# Patient Record
Sex: FEMALE | Race: WHITE | NOT HISPANIC OR LATINO
[De-identification: names, ages, dates, MRNs, and addresses within clinical notes are randomized per-mention and may not be internally consistent; named-entity substitution may affect disease eponyms.]

---

## 2017-09-21 ENCOUNTER — RESULT REVIEW (OUTPATIENT)
Age: 72
End: 2017-09-21

## 2018-12-18 ENCOUNTER — RESULT REVIEW (OUTPATIENT)
Age: 73
End: 2018-12-18

## 2019-11-25 ENCOUNTER — APPOINTMENT (OUTPATIENT)
Dept: MAMMOGRAPHY | Facility: CLINIC | Age: 74
End: 2019-11-25
Payer: COMMERCIAL

## 2019-11-25 PROCEDURE — 77063 BREAST TOMOSYNTHESIS BI: CPT

## 2019-11-25 PROCEDURE — 77067 SCR MAMMO BI INCL CAD: CPT

## 2020-01-06 ENCOUNTER — APPOINTMENT (OUTPATIENT)
Dept: RADIOLOGY | Facility: CLINIC | Age: 75
End: 2020-01-06
Payer: COMMERCIAL

## 2020-01-06 PROCEDURE — 71046 X-RAY EXAM CHEST 2 VIEWS: CPT

## 2020-06-17 PROBLEM — Z00.00 ENCOUNTER FOR PREVENTIVE HEALTH EXAMINATION: Status: ACTIVE | Noted: 2020-06-17

## 2021-12-17 ENCOUNTER — OUTPATIENT (OUTPATIENT)
Dept: OUTPATIENT SERVICES | Facility: HOSPITAL | Age: 76
LOS: 1 days | End: 2021-12-17

## 2021-12-22 ENCOUNTER — OUTPATIENT (OUTPATIENT)
Dept: OUTPATIENT SERVICES | Facility: HOSPITAL | Age: 76
LOS: 1 days | End: 2021-12-22

## 2022-11-10 ENCOUNTER — APPOINTMENT (OUTPATIENT)
Dept: MAMMOGRAPHY | Facility: CLINIC | Age: 77
End: 2022-11-10

## 2022-11-10 PROCEDURE — 77063 BREAST TOMOSYNTHESIS BI: CPT

## 2022-11-10 PROCEDURE — 77067 SCR MAMMO BI INCL CAD: CPT

## 2022-12-08 ENCOUNTER — OFFICE (OUTPATIENT)
Dept: URBAN - METROPOLITAN AREA CLINIC 9 | Facility: CLINIC | Age: 77
Setting detail: OPHTHALMOLOGY
End: 2022-12-08
Payer: MEDICARE

## 2022-12-08 DIAGNOSIS — H11.153: ICD-10-CM

## 2022-12-08 DIAGNOSIS — E11.9: ICD-10-CM

## 2022-12-08 DIAGNOSIS — H25.13: ICD-10-CM

## 2022-12-08 PROBLEM — H52.4 PRESBYOPIA: Status: ACTIVE | Noted: 2022-12-08

## 2022-12-08 PROCEDURE — 92004 COMPRE OPH EXAM NEW PT 1/>: CPT | Performed by: OPHTHALMOLOGY

## 2022-12-08 ASSESSMENT — CONFRONTATIONAL VISUAL FIELD TEST (CVF)
OS_FINDINGS: FULL
OD_FINDINGS: FULL

## 2022-12-08 ASSESSMENT — REFRACTION_CURRENTRX
OD_SPHERE: +1.75
OD_OVR_VA: 20/
OS_CYLINDER: +0.25
OS_ADD: +2.50
OD_ADD: +2.50
OS_OVR_VA: 20/
OD_VPRISM_DIRECTION: PROGS
OS_VPRISM_DIRECTION: PROGS
OS_AXIS: 141
OS_SPHERE: +1.75

## 2022-12-08 ASSESSMENT — REFRACTION_MANIFEST
OS_AXIS: 050
OS_ADD: +2.75
OD_AXIS: 135
OU_VA: 20/30
OD_ADD: +2.75
OD_CYLINDER: +1.00
OD_VA2: 20/30
OS_VA2: 20/30
OS_CYLINDER: +0.50
OD_VA1: 20/30-
OS_VA1: 20/50+3
OS_SPHERE: +0.75
OD_SPHERE: +0.75

## 2022-12-08 ASSESSMENT — KERATOMETRY
OS_K2POWER_DIOPTERS: 46.50
OD_K1POWER_DIOPTERS: 46.00
OS_AXISANGLE_DEGREES: 059
METHOD_AUTO_MANUAL: AUTO
OD_K2POWER_DIOPTERS: 47.00
OS_K1POWER_DIOPTERS: 45.75
OD_AXISANGLE_DEGREES: 129

## 2022-12-08 ASSESSMENT — AXIALLENGTH_DERIVED
OD_AL: 22.0181
OS_AL: 22.3116
OD_AL: 22.1032
OS_AL: 21.9685

## 2022-12-08 ASSESSMENT — SPHEQUIV_DERIVED
OD_SPHEQUIV: 1.25
OD_SPHEQUIV: 1.5
OS_SPHEQUIV: 2
OS_SPHEQUIV: 1

## 2022-12-08 ASSESSMENT — REFRACTION_AUTOREFRACTION
OS_CYLINDER: +1.00
OD_SPHERE: +0.75
OS_SPHERE: +1.50
OD_CYLINDER: +1.50
OD_AXIS: 134
OS_AXIS: 052

## 2022-12-08 ASSESSMENT — VISUAL ACUITY
OS_BCVA: 20/50
OD_BCVA: 20/50

## 2023-06-08 ENCOUNTER — OFFICE (OUTPATIENT)
Dept: URBAN - METROPOLITAN AREA CLINIC 9 | Facility: CLINIC | Age: 78
Setting detail: OPHTHALMOLOGY
End: 2023-06-08
Payer: MEDICARE

## 2023-06-08 DIAGNOSIS — H02.831: ICD-10-CM

## 2023-06-08 DIAGNOSIS — H11.042: ICD-10-CM

## 2023-06-08 DIAGNOSIS — H16.041: ICD-10-CM

## 2023-06-08 DIAGNOSIS — H11.153: ICD-10-CM

## 2023-06-08 DIAGNOSIS — H01.001: ICD-10-CM

## 2023-06-08 DIAGNOSIS — H01.004: ICD-10-CM

## 2023-06-08 DIAGNOSIS — H02.834: ICD-10-CM

## 2023-06-08 PROCEDURE — 99213 OFFICE O/P EST LOW 20 MIN: CPT | Performed by: OPHTHALMOLOGY

## 2023-06-08 ASSESSMENT — KERATOMETRY
OS_K2POWER_DIOPTERS: 46.75
OD_K2POWER_DIOPTERS: 47.25
OS_AXISANGLE_DEGREES: 063
OD_AXISANGLE_DEGREES: 131
OS_K1POWER_DIOPTERS: 45.75
METHOD_AUTO_MANUAL: AUTO
OD_K1POWER_DIOPTERS: 46.00

## 2023-06-08 ASSESSMENT — CONFRONTATIONAL VISUAL FIELD TEST (CVF)
OS_FINDINGS: FULL
OD_FINDINGS: FULL

## 2023-06-08 ASSESSMENT — REFRACTION_MANIFEST
OD_CYLINDER: +1.00
OS_AXIS: 050
OS_VA1: 20/50+3
OU_VA: 20/30
OS_CYLINDER: +0.50
OS_ADD: +2.75
OD_SPHERE: +0.75
OS_SPHERE: +0.75
OD_AXIS: 135
OD_VA1: 20/30-
OD_ADD: +2.75
OD_VA2: 20/30
OS_VA2: 20/30

## 2023-06-08 ASSESSMENT — REFRACTION_AUTOREFRACTION
OS_SPHERE: +2.00
OD_CYLINDER: +1.50
OD_SPHERE: +0.50
OD_AXIS: 140
OS_AXIS: 036
OS_CYLINDER: +0.75

## 2023-06-08 ASSESSMENT — SPHEQUIV_DERIVED
OS_SPHEQUIV: 1
OD_SPHEQUIV: 1.25
OD_SPHEQUIV: 1.25
OS_SPHEQUIV: 2.375

## 2023-06-08 ASSESSMENT — LID POSITION - DERMATOCHALASIS
OS_DERMATOCHALASIS: LUL 1+
OD_DERMATOCHALASIS: RUL 1+

## 2023-06-08 ASSESSMENT — CORNEAL PTERYGIUM: OS_PTERYGIUM: 1MM

## 2023-06-08 ASSESSMENT — REFRACTION_CURRENTRX
OD_CYLINDER: +1.00
OS_OVR_VA: 20/
OS_SPHERE: +0.50
OD_AXIS: 049
OS_CYLINDER: +1.25
OD_SPHERE: +1.50
OD_OVR_VA: 20/
OS_ADD: +2.75
OS_AXIS: 136
OD_ADD: +2.75
OS_VPRISM_DIRECTION: PROGS
OD_VPRISM_DIRECTION: PROGS

## 2023-06-08 ASSESSMENT — AXIALLENGTH_DERIVED
OD_AL: 22.063
OS_AL: 21.8032
OS_AL: 22.2706
OD_AL: 22.063

## 2023-06-08 ASSESSMENT — LID EXAM ASSESSMENTS
OD_BLEPHARITIS: RUL 1+
OS_BLEPHARITIS: LUL 1+

## 2023-06-08 ASSESSMENT — VISUAL ACUITY
OS_BCVA: 20/20
OD_BCVA: 20/50

## 2023-06-09 ENCOUNTER — OFFICE (OUTPATIENT)
Dept: URBAN - METROPOLITAN AREA CLINIC 38 | Facility: CLINIC | Age: 78
Setting detail: OPHTHALMOLOGY
End: 2023-06-09
Payer: MEDICARE

## 2023-06-09 DIAGNOSIS — H16.041: ICD-10-CM

## 2023-06-09 PROCEDURE — 99213 OFFICE O/P EST LOW 20 MIN: CPT | Performed by: OPHTHALMOLOGY

## 2023-06-09 ASSESSMENT — KERATOMETRY
OD_AXISANGLE_DEGREES: 131
OS_K2POWER_DIOPTERS: 46.75
OD_K1POWER_DIOPTERS: 46.00
OS_K1POWER_DIOPTERS: 45.75
OD_K2POWER_DIOPTERS: 47.25
METHOD_AUTO_MANUAL: AUTO
OS_AXISANGLE_DEGREES: 063

## 2023-06-09 ASSESSMENT — REFRACTION_MANIFEST
OD_VA2: 20/30
OD_VA1: 20/30-
OS_ADD: +2.75
OD_AXIS: 135
OS_VA2: 20/30
OS_SPHERE: +0.75
OS_VA1: 20/50+3
OU_VA: 20/30
OS_CYLINDER: +0.50
OD_SPHERE: +0.75
OD_CYLINDER: +1.00
OD_ADD: +2.75
OS_AXIS: 050

## 2023-06-09 ASSESSMENT — CONFRONTATIONAL VISUAL FIELD TEST (CVF)
OD_FINDINGS: FULL
OS_FINDINGS: FULL

## 2023-06-09 ASSESSMENT — REFRACTION_AUTOREFRACTION
OD_CYLINDER: +1.50
OD_AXIS: 140
OD_SPHERE: +0.50
OS_AXIS: 036
OS_CYLINDER: +0.75
OS_SPHERE: +2.00

## 2023-06-09 ASSESSMENT — VISUAL ACUITY
OD_BCVA: 20/40-1
OS_BCVA: 20/25-2

## 2023-06-09 ASSESSMENT — REFRACTION_CURRENTRX
OS_SPHERE: +0.50
OS_OVR_VA: 20/
OD_SPHERE: +1.50
OS_ADD: +2.75
OS_AXIS: 136
OD_AXIS: 049
OD_OVR_VA: 20/
OS_VPRISM_DIRECTION: PROGS
OS_CYLINDER: +1.25
OD_CYLINDER: +1.00
OD_ADD: +2.75
OD_VPRISM_DIRECTION: PROGS

## 2023-06-09 ASSESSMENT — SPHEQUIV_DERIVED
OS_SPHEQUIV: 2.375
OD_SPHEQUIV: 1.25
OD_SPHEQUIV: 1.25
OS_SPHEQUIV: 1

## 2023-06-09 ASSESSMENT — LID POSITION - DERMATOCHALASIS
OS_DERMATOCHALASIS: LUL 1+
OD_DERMATOCHALASIS: RUL 1+

## 2023-06-09 ASSESSMENT — CORNEAL PTERYGIUM: OS_PTERYGIUM: 1MM

## 2023-06-09 ASSESSMENT — AXIALLENGTH_DERIVED
OS_AL: 21.8032
OS_AL: 22.2706
OD_AL: 22.063
OD_AL: 22.063

## 2023-06-09 ASSESSMENT — LID EXAM ASSESSMENTS
OS_BLEPHARITIS: LUL 1+
OD_BLEPHARITIS: RUL 1+

## 2023-06-16 ENCOUNTER — RX ONLY (RX ONLY)
Age: 78
End: 2023-06-16

## 2023-06-16 ENCOUNTER — OFFICE (OUTPATIENT)
Dept: URBAN - METROPOLITAN AREA CLINIC 38 | Facility: CLINIC | Age: 78
Setting detail: OPHTHALMOLOGY
End: 2023-06-16
Payer: MEDICARE

## 2023-06-16 DIAGNOSIS — H16.041: ICD-10-CM

## 2023-06-16 PROBLEM — H02.834 DERMATOCHALASIS; RIGHT UPPER LID, LEFT UPPER LID: Status: ACTIVE | Noted: 2023-06-08

## 2023-06-16 PROBLEM — H02.831 DERMATOCHALASIS; RIGHT UPPER LID, LEFT UPPER LID: Status: ACTIVE | Noted: 2023-06-08

## 2023-06-16 PROBLEM — H01.001 BLEPHARITIS; RIGHT UPPER LID, LEFT UPPER LID: Status: ACTIVE | Noted: 2023-06-08

## 2023-06-16 PROBLEM — H01.004 BLEPHARITIS; RIGHT UPPER LID, LEFT UPPER LID: Status: ACTIVE | Noted: 2023-06-08

## 2023-06-16 PROBLEM — H11.042 PTERYGIUM-PERIPHERAL; LEFT EYE: Status: ACTIVE | Noted: 2023-06-08

## 2023-06-16 PROCEDURE — 99213 OFFICE O/P EST LOW 20 MIN: CPT | Performed by: OPHTHALMOLOGY

## 2023-06-16 ASSESSMENT — REFRACTION_MANIFEST
OS_AXIS: 050
OD_CYLINDER: +1.00
OD_SPHERE: +0.75
OS_ADD: +2.75
OS_VA1: 20/50+3
OS_VA2: 20/30
OU_VA: 20/30
OD_VA2: 20/30
OD_AXIS: 135
OS_SPHERE: +0.75
OD_VA1: 20/30-
OS_CYLINDER: +0.50
OD_ADD: +2.75

## 2023-06-16 ASSESSMENT — CONFRONTATIONAL VISUAL FIELD TEST (CVF)
OS_FINDINGS: FULL
OD_FINDINGS: FULL

## 2023-06-16 ASSESSMENT — SPHEQUIV_DERIVED
OD_SPHEQUIV: 1.25
OS_SPHEQUIV: 2.375
OS_SPHEQUIV: 1
OD_SPHEQUIV: 1.25

## 2023-06-16 ASSESSMENT — REFRACTION_CURRENTRX
OS_SPHERE: +0.50
OS_OVR_VA: 20/
OD_ADD: +2.75
OS_AXIS: 136
OD_OVR_VA: 20/
OD_VPRISM_DIRECTION: PROGS
OS_VPRISM_DIRECTION: PROGS
OS_ADD: +2.75
OD_AXIS: 049
OD_CYLINDER: +1.00
OS_CYLINDER: +1.25
OD_SPHERE: +1.50

## 2023-06-16 ASSESSMENT — REFRACTION_AUTOREFRACTION
OD_SPHERE: +0.50
OD_AXIS: 140
OS_CYLINDER: +0.75
OS_AXIS: 036
OS_SPHERE: +2.00
OD_CYLINDER: +1.50

## 2023-06-16 ASSESSMENT — VISUAL ACUITY
OS_BCVA: 20/25-2
OD_BCVA: 20/40-

## 2023-06-16 ASSESSMENT — AXIALLENGTH_DERIVED
OD_AL: 22.063
OD_AL: 22.063
OS_AL: 22.2706
OS_AL: 21.8032

## 2023-06-16 ASSESSMENT — LID EXAM ASSESSMENTS
OD_BLEPHARITIS: RUL 1+
OS_BLEPHARITIS: LUL 1+

## 2023-06-16 ASSESSMENT — KERATOMETRY
OD_K1POWER_DIOPTERS: 46.00
OD_K2POWER_DIOPTERS: 47.25
OS_K1POWER_DIOPTERS: 45.75
OS_AXISANGLE_DEGREES: 063
OD_AXISANGLE_DEGREES: 131
METHOD_AUTO_MANUAL: AUTO
OS_K2POWER_DIOPTERS: 46.75

## 2023-06-16 ASSESSMENT — CORNEAL PTERYGIUM: OS_PTERYGIUM: 1MM

## 2023-06-16 ASSESSMENT — LID POSITION - DERMATOCHALASIS
OD_DERMATOCHALASIS: RUL 1+
OS_DERMATOCHALASIS: LUL 1+

## 2024-12-19 ENCOUNTER — INPATIENT (INPATIENT)
Facility: HOSPITAL | Age: 79
LOS: 18 days | Discharge: HOME | DRG: 316 | End: 2025-01-07
Attending: INTERNAL MEDICINE | Admitting: INTERNAL MEDICINE
Payer: MEDICARE

## 2024-12-19 ENCOUNTER — TRANSCRIPTION ENCOUNTER (OUTPATIENT)
Age: 79
End: 2024-12-19

## 2024-12-19 VITALS
OXYGEN SATURATION: 97 % | WEIGHT: 178.13 LBS | TEMPERATURE: 98 F | SYSTOLIC BLOOD PRESSURE: 113 MMHG | HEART RATE: 69 BPM | RESPIRATION RATE: 16 BRPM | DIASTOLIC BLOOD PRESSURE: 71 MMHG | HEIGHT: 65 IN

## 2024-12-19 DIAGNOSIS — Z95.3 PRESENCE OF XENOGENIC HEART VALVE: ICD-10-CM

## 2024-12-19 LAB
GLUCOSE BLDC GLUCOMTR-MCNC: 167 MG/DL — HIGH (ref 70–99)
GLUCOSE BLDC GLUCOMTR-MCNC: 182 MG/DL — HIGH (ref 70–99)
SARS-COV-2 RNA SPEC QL NAA+PROBE: SIGNIFICANT CHANGE UP

## 2024-12-19 RX ORDER — PANTOPRAZOLE 40 MG/1
40 TABLET, DELAYED RELEASE ORAL
Refills: 0 | Status: DISCONTINUED | OUTPATIENT
Start: 2024-12-20 | End: 2025-01-07

## 2024-12-19 RX ORDER — SODIUM CHLORIDE 9 MG/ML
1000 INJECTION, SOLUTION INTRAVENOUS
Refills: 0 | Status: DISCONTINUED | OUTPATIENT
Start: 2024-12-19 | End: 2025-01-07

## 2024-12-19 RX ORDER — DEXTROSE MONOHYDRATE 25 G/50ML
15 INJECTION, SOLUTION INTRAVENOUS ONCE
Refills: 0 | Status: DISCONTINUED | OUTPATIENT
Start: 2024-12-19 | End: 2025-01-07

## 2024-12-19 RX ORDER — METOPROLOL TARTRATE 50 MG
12.5 TABLET ORAL
Refills: 0 | Status: DISCONTINUED | OUTPATIENT
Start: 2024-12-19 | End: 2025-01-07

## 2024-12-19 RX ORDER — AMIODARONE HYDROCHLORIDE 200 MG/1
200 TABLET ORAL DAILY
Refills: 0 | Status: DISCONTINUED | OUTPATIENT
Start: 2024-12-20 | End: 2025-01-07

## 2024-12-19 RX ORDER — POLYETHYLENE GLYCOL 3350 17 G/DOSE
17 POWDER (GRAM) ORAL DAILY
Refills: 0 | Status: DISCONTINUED | OUTPATIENT
Start: 2024-12-19 | End: 2024-12-26

## 2024-12-19 RX ORDER — ACETAMINOPHEN 80 MG/.8ML
650 SOLUTION/ DROPS ORAL EVERY 6 HOURS
Refills: 0 | Status: DISCONTINUED | OUTPATIENT
Start: 2024-12-19 | End: 2025-01-07

## 2024-12-19 RX ORDER — ESCITALOPRAM OXALATE 10 MG/1
20 TABLET ORAL AT BEDTIME
Refills: 0 | Status: DISCONTINUED | OUTPATIENT
Start: 2024-12-19 | End: 2025-01-07

## 2024-12-19 RX ORDER — DEXTROSE MONOHYDRATE 25 G/50ML
25 INJECTION, SOLUTION INTRAVENOUS ONCE
Refills: 0 | Status: DISCONTINUED | OUTPATIENT
Start: 2024-12-19 | End: 2025-01-07

## 2024-12-19 RX ORDER — CLOPIDOGREL BISULFATE 75 MG/1
75 TABLET, FILM COATED ORAL DAILY
Refills: 0 | Status: DISCONTINUED | OUTPATIENT
Start: 2024-12-20 | End: 2025-01-03

## 2024-12-19 RX ORDER — GLUCAGON INJECTION, SOLUTION 0.5 MG/.1ML
1 INJECTION, SOLUTION SUBCUTANEOUS ONCE
Refills: 0 | Status: DISCONTINUED | OUTPATIENT
Start: 2024-12-19 | End: 2025-01-07

## 2024-12-19 RX ORDER — SENNOSIDES 8.6 MG/1
2 TABLET, FILM COATED ORAL AT BEDTIME
Refills: 0 | Status: DISCONTINUED | OUTPATIENT
Start: 2024-12-19 | End: 2024-12-26

## 2024-12-19 RX ORDER — INSULIN GLARGINE-YFGN 100 [IU]/ML
12 INJECTION, SOLUTION SUBCUTANEOUS AT BEDTIME
Refills: 0 | Status: DISCONTINUED | OUTPATIENT
Start: 2024-12-19 | End: 2024-12-30

## 2024-12-19 RX ORDER — ASPIRIN 81 MG
81 TABLET, DELAYED RELEASE (ENTERIC COATED) ORAL DAILY
Refills: 0 | Status: DISCONTINUED | OUTPATIENT
Start: 2024-12-20 | End: 2024-12-23

## 2024-12-19 RX ORDER — INSULIN LISPRO 100/ML
VIAL (ML) SUBCUTANEOUS
Refills: 0 | Status: DISCONTINUED | OUTPATIENT
Start: 2024-12-19 | End: 2025-01-07

## 2024-12-19 RX ORDER — DEXTROSE MONOHYDRATE 25 G/50ML
12.5 INJECTION, SOLUTION INTRAVENOUS ONCE
Refills: 0 | Status: DISCONTINUED | OUTPATIENT
Start: 2024-12-19 | End: 2025-01-07

## 2024-12-19 RX ORDER — IPRATROPIUM BROMIDE AND ALBUTEROL SULFATE .5; 2.5 MG/3ML; MG/3ML
3 SOLUTION RESPIRATORY (INHALATION) EVERY 6 HOURS
Refills: 0 | Status: DISCONTINUED | OUTPATIENT
Start: 2024-12-19 | End: 2025-01-07

## 2024-12-19 RX ORDER — INSULIN LISPRO 100/ML
VIAL (ML) SUBCUTANEOUS AT BEDTIME
Refills: 0 | Status: DISCONTINUED | OUTPATIENT
Start: 2024-12-19 | End: 2025-01-07

## 2024-12-19 RX ORDER — BUMETANIDE 2 MG/1
1 TABLET ORAL DAILY
Refills: 0 | Status: DISCONTINUED | OUTPATIENT
Start: 2024-12-20 | End: 2025-01-07

## 2024-12-19 RX ADMIN — INSULIN GLARGINE-YFGN 12 UNIT(S): 100 INJECTION, SOLUTION SUBCUTANEOUS at 21:49

## 2024-12-19 RX ADMIN — SENNOSIDES 2 TABLET(S): 8.6 TABLET, FILM COATED ORAL at 22:23

## 2024-12-19 RX ADMIN — ESCITALOPRAM OXALATE 20 MILLIGRAM(S): 10 TABLET ORAL at 22:23

## 2024-12-19 RX ADMIN — Medication 1 APPLICATION(S): at 18:32

## 2024-12-19 RX ADMIN — IPRATROPIUM BROMIDE AND ALBUTEROL SULFATE 3 MILLILITER(S): .5; 2.5 SOLUTION RESPIRATORY (INHALATION) at 21:52

## 2024-12-19 RX ADMIN — Medication 2: at 18:26

## 2024-12-19 RX ADMIN — Medication 12.5 MILLIGRAM(S): at 18:32

## 2024-12-19 NOTE — H&P ADULT - NSICDXPASTMEDICALHX_GEN_ALL_CORE_FT
PAST MEDICAL HISTORY:  Aortic stenosis     Chronic obstructive pulmonary disease     HLD (hyperlipidemia)     HTN (hypertension)     Non-alcoholic fatty liver disease     Obstructive sleep apnea on CPAP     Type 2 diabetes mellitus

## 2024-12-19 NOTE — H&P ADULT - HISTORY OF PRESENT ILLNESS
Marysol Drake is a 79 year old, obese, female with PMH of DAYANA (on CPAP), aortic stenosis, HTN, HLD, current smoker, COPD, Type 2 Diabetes Mellitus, and non-alcoholic fatty liver disease; who presented to [facility] on 11/20 for a scheduled TAVR, and possible stent placement.  She reports several month history of DUFF while walking and climbing up stairs, fatigue and BL LE edema. On 11/20 she underwent TAVR, drainage of retroperitoneal hematoma and pericardial window with Dr. Garland and Noah. Intraoperatively, she had an episode of Vtach (on Amiodarone).      Her hospital course was complicated by the following: acute hypoxic respiratory failure (requiring BiPAP and eventual intubation on 11/25), hemorrhagic shock, AFIB w/ RVR, coffee ground emesis, Code Blue (s/p CPR resulting in rib fractures), AIDA, fever secondary to aspiration PNA, +pseudomonas in sputum, persistent leukocytosis and low grade fevers (on Vanco per ID), L lateral abdominal wall hematoma, distended gallbladder and gallbladder sludge, dysphagia (s/p trache and PEG placement on 12/3), decannulation (12/13), L groin delayed wound healing (requiring wound vac), and hyperglycemia.       PM&R was consulted and deemed her an appropriate candidate for IRF. She was medically stabilized and cleared for discharge to [facility].  Mraysol Drake is a 79 year old, obese, female with PMH of DAYANA (on CPAP), aortic stenosis, HTN, HLD, current smoker, COPD, Type 2 Diabetes Mellitus, and non-alcoholic fatty liver disease; who presented to [facility] on 11/20 for a scheduled TAVR, and possible stent placement.  She reports several month history of DUFF while walking and climbing up stairs, fatigue and BL LE edema. On 11/20 she underwent TAVR, drainage of retroperitoneal hematoma and pericardial window with Dr. Garland and Noah. Intraoperatively, she had an episode of Vtach (on Amiodarone).      Her hospital course was complicated by the following: acute hypoxic respiratory failure (requiring BiPAP and eventual intubation on 11/25), hemorrhagic shock, AFIB w/ RVR, coffee ground emesis, Code Blue (s/p CPR resulting in rib fractures), AIDA, fever secondary to aspiration PNA, +pseudomonas in sputum, persistent leukocytosis and low grade fevers (on Vanco per ID), L lateral abdominal wall hematoma, distended gallbladder and gallbladder sludge, dysphagia (s/p trache and PEG placement on 12/3), decannulation (12/13), L groin delayed wound healing (requiring wound vac), and hyperglycemia.     PM&R was consulted and deemed her an appropriate candidate for IRF. She was medically stabilized and cleared for discharge to [facility].  Yes

## 2024-12-19 NOTE — H&P ADULT - ATTENDING COMMENTS
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 Community dwelling  Background hx as stated  Acute care admission [facility] for scheduled TAVR for aortic valve disease 11/20. Had multiple post op complications, including peritoneal/ pericardial fluid collection, Ventricular Tacchycardia, Afib, pneumonia, AIDA, Cardio/Resp failure, requiring Trach, CPR, and PEG feeding  Now decannulated, breathing normally, tolerating oral diet, has left groin wound, access site for TAVR  Acute rehab admission 12/19    Living alone, independent with ADLs, has 5 dogs  Daughter is an RN and supportive     Currenlty ambulating limited distance with walker   Admits to impaired balance    Exam  Alert appropriately interactive, but exuberrant   Chest--normal air entry   Abd--full to slightly distended,   Ext--left ground wound, PEG on left mid abdomen  Trach site tiny opening    RECENT LABS/IMAGING                        9.8    7.01  )-----------( 165      ( 20 Dec 2024 09:53 )             30.7     12-20    134[L]  |  98  |  15  ----------------------------<  179[H]  3.6   |  29  |  0.74    Ca    8.8      20 Dec 2024 09:53    TPro  7.1  /  Alb  2.3[L]  /  TBili  0.6  /  DBili  x   /  AST  28  /  ALT  34  /  AlkPhos  224[H]  12-20      Urinalysis Basic - ( 20 Dec 2024 09:53 )    Color: x / Appearance: x / SG: x / pH: x  Gluc: 179 mg/dL / Ketone: x  / Bili: x / Urobili: x   Blood: x / Protein: x / Nitrite: x   Leuk Esterase: x / RBC: x / WBC x   Sq Epi: x / Non Sq Epi: x / Bacteria: x    Dx Aortic valve stenosis s/p TAVR with multiple complications including cardio/Resp failure  Commence therapy   Continue oral feeding, PEG to remain in situ   Continue analgesic treatments, GI protection, bowel regimen   Lab results, unremarkable  Left groin wound s/p recent wound vac in acute care--f/u wound care consult  Dysphagia--tolerating oral feeds, PEG to continue in situ and f/u GI outpatient  (placed 12/3)  SLP evaluation  Change dry dressing cover on trach site, every 2 days  Est dc 10-14 days to home  Collateral hx to be obtained

## 2024-12-19 NOTE — H&P ADULT - NSHPSOCIALHISTORY_GEN_ALL_CORE
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  Pt owns a shower chair   PTA pt was independent with ADLs     CURRENT FUNCTIONAL STATUS  Min A with walker

## 2024-12-19 NOTE — H&P ADULT - NSHPREVIEWOFSYSTEMS_GEN_ALL_CORE
REVIEW OF SYSTEMS  Constitutional: No fever, No Chills, + fatigue  HEENT: No eye pain, No visual disturbances, No difficulty hearing  Pulm: +intermittent nonproductive cough,  No shortness of breath  Cardio: No chest pain, No palpitations  GI:  No abdominal pain, +  nausea, No vomiting, No diarrhea, No constipation LBM 12/19  : No dysuria, No frequency, No hematuria  Neuro: No headaches, No memory loss, No loss of strength, No numbness, No tremors  Skin: No itching, No rashes, No lesions   Endo: No temperature intolerance  MSK: No joint pain, No joint swelling, No muscle pain, No Neck pain,  No back pain  Psych:  No depression, No anxiety

## 2024-12-19 NOTE — H&P ADULT - ASSESSMENT
Assessment/Plan:  FANI LERMA is a 79 year old, obese, female with PMH of DAYANA (on CPAP), aortic stenosis, HTN, HLD, current smoker, COPD, Type 2 Diabetes Mellitus, and non-alcoholic fatty liver disease; who presented to [facility] on 11/20 for a scheduled TAVR, and possible stent placement.  She reports several month history of DUFF while walking and climbing up stairs, fatigue and BL LE edema. On 11/20 she underwent TAVR, drainage of retroperitoneal hematoma and pericardial window with Dr. Garland and Noah. Intraoperatively, she had an episode of Vtach (on Amiodarone).      Her hospital course was complicated by the following: acute hypoxic respiratory failure (requiring BiPAP and eventual intubation on 11/25), hemorrhagic shock, AFIB w/ RVR, coffee ground emesis, Code Blue (s/p CPR resulting in rib fractures), AIDA, fever secondary to aspiration PNA, +pseudomonas in sputum, persistent leukocytosis and low grade fevers (on Vanco per ID), L lateral abdominal wall hematoma, distended gallbladder and gallbladder sludge, dysphagia (s/p trache and PEG placement on 12/3), decannulation (12/13), L groin delayed wound healing (requiring wound vac), and hyperglycemia.  Patient now admitted for a multidisciplinary rehab program. 12-19-24 @ 13:25  -------------  Rehab Management/MEDICAL MANAGEMENT     #S/p TAVR  - Gait Instability, ADL impairments and Functional impairments: start Comprehensive Rehab Program of PT/OT/SLP  - 3 hours a day, 5 days a week  - P&O as needed   - ASA 81mg daily  - Plavix 75mg daily     #Acute Hypoxic Respiratory Failures  - PRN: Nebulizer QID     #HTN  #AFIB w/ RVR  - Amiodarone 200mg daily   - Bumex 1mg daily   - Metoprolol 12.5mg BID     #Type 2 Diabetes Mellitus  - A1c: 6.4% (Dec 2024)    - FS AC & HS  - ISS  - Lantus     #Mood  - Escitalopram 20mg daily     #Skin  - Skin on admission:   - Pressure injury/Skin: OOB to Chair, PT/OT     #Pain Mgmt   - PRN: Tylenol     #GI/Bowel Mgmt   - Pantoprazole  - Senna & Zofran   - PRN: Zofran QID      #/Bladder Mgmt   -  PVR q8h, CIC>400cc    #FEN   - Diet - Easy to Chew with mildly thick liquids   - Dysphagia  SLP - evaluation and treatment    #Precautions / PROPHYLAXIS:   - Falls, Cardiac, Sternal, Spinal, Seizure   - ortho: Weight bearing status: WBAT   - Lungs: Aspiration, Incentive Spirometer   - DVT: Heparin 5000 units BID    Next of Kin:   Daughter: Janie Lerma: [phone]  ----------------------------------------------  Dr. Bailey Liaison with Family/Providers:    -----------------------------------------------  OUTPATIENT/FOLLOW UP:      -----------------------------------------------  MEDICAL PROGNOSIS: GOOD                                   REHAB POTENTIAL: GOOD  ESTIMATED DISPOSITION: HOME                             ELOS: 10-14 Days   EXPECTED THERAPY:     P.T. 1hr/day       O.T. 1hr/day      S.L.P. 1hr/day      EXP FREQUENCY: 5 days per 7 day period     PRESCREEN COMPARISON: I have reviewed the prescreen information and I have found no relevant changes between the preadmission screening and my post admission evaluation     RATIONALE FOR INPATIENT ADMISSION - Patient demonstrates the following: (check all that apply)  [X] Medically appropriate for rehabilitation admission  [X] Has attainable rehab goals with an appropriate initial discharge plan  [X] Has rehabilitation potential (expected to make a significant improvement within a reasonable period of time)   [X] Requires close medical managment by a rehab physician, rehab nursing care, Hospitalist and comprehensive interdisciplinary team (including PT, OT, & or SLP, Prosthetics and Orthotics)     Assessment/Plan:  FANI LERMA is a 79 year old, obese, female with PMH of DAYANA (on CPAP), aortic stenosis, HTN, HLD, current smoker, COPD, Type 2 Diabetes Mellitus, and non-alcoholic fatty liver disease; who presented to [facility] on 11/20 for a scheduled TAVR, and possible stent placement.  She reports several month history of DUFF while walking and climbing up stairs, fatigue and BL LE edema. On 11/20 she underwent TAVR, drainage of retroperitoneal hematoma and pericardial window with Dr. Garland and Noah. Intraoperatively, she had an episode of Vtach (on Amiodarone).      Her hospital course was complicated by the following: acute hypoxic respiratory failure (requiring BiPAP and eventual intubation on 11/25), hemorrhagic shock, AFIB w/ RVR, coffee ground emesis, Code Blue (s/p CPR resulting in rib fractures), AIDA, fever secondary to aspiration PNA, +pseudomonas in sputum, persistent leukocytosis and low grade fevers (on Vanco per ID), L lateral abdominal wall hematoma, distended gallbladder and gallbladder sludge, dysphagia (s/p trache and PEG placement on 12/3), decannulation (12/13), L groin delayed wound healing (requiring wound vac), and hyperglycemia.  Patient now admitted for a multidisciplinary rehab program. 12-19-24 @ 13:25  -------------  Rehab Management/MEDICAL MANAGEMENT     #S/p TAVR  - Gait Instability, ADL impairments and Functional impairments: start Comprehensive Rehab Program of PT/OT/SLP  - 3 hours a day, 5 days a week  - P&O as needed   - ASA 81mg daily  - Plavix 75mg daily     #Acute Hypoxic Respiratory Failures  - PRN: Nebulizer QID     #HTN  #AFIB w/ RVR  - Amiodarone 200mg daily   - Bumex 1mg daily   - Metoprolol 12.5mg BID     #Type 2 Diabetes Mellitus  - A1c: 6.4% (Dec 2024)    - FS AC & HS  - ISS  - Lantus     #Mood  - Escitalopram 20mg daily     #Skin  - Skin on admission:   - Pressure injury/Skin: OOB to Chair, PT/OT     #Pain Mgmt   - PRN: Tylenol     #GI/Bowel Mgmt   - Pantoprazole  - Senna & Zofran   - PRN: Zofran QID      #/Bladder Mgmt   -  PVR q8h, CIC>400cc    #FEN   - Diet - Easy to Chew with mildly thick liquids   - Dysphagia  SLP - evaluation and treatment    #Precautions / PROPHYLAXIS:   - Falls, Cardiac, Sternal, Spinal, Seizure   - ortho: Weight bearing status: WBAT   - Lungs: Aspiration, Incentive Spirometer   - DVT: Heparin 5000 units BID    Next of Kin:   Daughter: Janie Lerma: [phone]  ----------------------------------------------  Dr. Bailey Liaison with Family/Providers:    -----------------------------------------------  OUTPATIENT/FOLLOW UP:    Todd Devine MD  Vascular surgery, General surgery  [address]  [address]  [place] [address]  [phone]    Todd Odom MD  Cardiology, Interventional  [facility]   [address]  [address]  [facility], [id number] [phone]        -----------------------------------------------  MEDICAL PROGNOSIS: GOOD                                   REHAB POTENTIAL: GOOD  ESTIMATED DISPOSITION: HOME                             ELOS: 10-14 Days   EXPECTED THERAPY:     P.T. 1hr/day       O.T. 1hr/day      S.L.P. 1hr/day      EXP FREQUENCY: 5 days per 7 day period     PRESCREEN COMPARISON: I have reviewed the prescreen information and I have found no relevant changes between the preadmission screening and my post admission evaluation     RATIONALE FOR INPATIENT ADMISSION - Patient demonstrates the following: (check all that apply)  [X] Medically appropriate for rehabilitation admission  [X] Has attainable rehab goals with an appropriate initial discharge plan  [X] Has rehabilitation potential (expected to make a significant improvement within a reasonable period of time)   [X] Requires close medical managment by a rehab physician, rehab nursing care, Hospitalist and comprehensive interdisciplinary team (including PT, OT, & or SLP, Prosthetics and Orthotics)     Assessment/Plan:  FANI LERMA is a 79 year old, obese, female with PMH of DAYANA (on CPAP), aortic stenosis, HTN, HLD, current smoker, COPD, Type 2 Diabetes Mellitus, and non-alcoholic fatty liver disease; who presented to [facility] on 11/20 for a scheduled TAVR, and possible stent placement.  She reports several month history of DUFF while walking and climbing up stairs, fatigue and BL LE edema. On 11/20 she underwent TAVR, drainage of retroperitoneal hematoma and pericardial window with Dr. Garland and Noah. Intraoperatively, she had an episode of Vtach (on Amiodarone).      Her hospital course was complicated by the following: acute hypoxic respiratory failure (requiring BiPAP and eventual intubation on 11/25), hemorrhagic shock, AFIB w/ RVR, coffee ground emesis, Code Blue (s/p CPR resulting in rib fractures), AIDA, fever secondary to aspiration PNA, +pseudomonas in sputum, persistent leukocytosis and low grade fevers (on Vanco per ID), L lateral abdominal wall hematoma, distended gallbladder and gallbladder sludge, dysphagia (s/p trache and PEG placement on 12/3), decannulation (12/13), L groin delayed wound healing (requiring wound vac), and hyperglycemia.  Patient now admitted for a multidisciplinary rehab program. 12-19-24 @ 13:25  -------------  Rehab Management/MEDICAL MANAGEMENT     #S/p TAVR  - Gait Instability, ADL impairments and Functional impairments: start Comprehensive Rehab Program of PT/OT/SLP  - 3 hours a day, 5 days a week  - P&O as needed   - ASA 81mg daily  - Plavix 75mg daily     #Acute Hypoxic Respiratory Failures  - PRN: Nebulizer QID     #HTN  #AFIB w/ RVR  - Amiodarone 200mg daily   - Bumex 1mg daily   - Metoprolol 12.5mg BID     #Type 2 Diabetes Mellitus  - A1c: 6.4% (Dec 2024)    - FS AC & HS  - Mod ISS  - Lantus 12U HS    #Mood  - Escitalopram 20mg daily     #Skin  - Skin on admission: Trach site 1x0.5cm with scan tan drainage with gauze and tape, Mid sternum incision well approximated, healed 5.5cm, MUNIRA, abd bruising, perineal bruising, L groin incision [id number] incision, wet to dry packing with gauze, CDI, L flank bruising, generalized ecchymosis and scabbing, b/l dry feet   - Aquaphor BID   - Pressure injury/Skin: OOB to Chair, PT/OT     #Pain Mgmt   - PRN: Tylenol     #GI/Bowel Mgmt   - Pantoprazole  - Senna , Miralax    #/Bladder Mgmt   -  PVR q8h, CIC>400cc    #FEN   - Diet - Easy to Chew with mildly thick liquids   - Dysphagia  SLP - evaluation and treatment    #Precautions / PROPHYLAXIS:   - Falls, Cardiac, Sternal, Spinal, Seizure   - ortho: Weight bearing status: WBAT   - Lungs: Aspiration, Incentive Spirometer   - DVT: Heparin 5000 units BID    Next of Kin:   Daughter: Janie Lerma: [phone]  ----------------------------------------------  Dr. Bailey Liaison with Family/Providers:    -----------------------------------------------  OUTPATIENT/FOLLOW UP:    Todd Devine MD  Vascular surgery, General surgery  [address]  [address]  [place], [place] [id number] [phone]    Todd Odom MD  Cardiology, Interventional  [facility]   [address]  [address]  [place], [address] [phone]        -----------------------------------------------  MEDICAL PROGNOSIS: GOOD                                   REHAB POTENTIAL: GOOD  ESTIMATED DISPOSITION: HOME                             ELOS: 10-14 Days   EXPECTED THERAPY:     P.T. 1hr/day       O.T. 1hr/day      S.L.P. 1hr/day      EXP FREQUENCY: 5 days per 7 day period     PRESCREEN COMPARISON: I have reviewed the prescreen information and I have found no relevant changes between the preadmission screening and my post admission evaluation     RATIONALE FOR INPATIENT ADMISSION - Patient demonstrates the following: (check all that apply)  [X] Medically appropriate for rehabilitation admission  [X] Has attainable rehab goals with an appropriate initial discharge plan  [X] Has rehabilitation potential (expected to make a significant improvement within a reasonable period of time)   [X] Requires close medical managment by a rehab physician, rehab nursing care, Hospitalist and comprehensive interdisciplinary team (including PT, OT, & or SLP, Prosthetics and Orthotics)     Assessment/Plan:  FANI LERMA is a 79 year old, obese, female with PMH of DAYANA (on CPAP), aortic stenosis, HTN, HLD, current smoker, COPD, Type 2 Diabetes Mellitus, and non-alcoholic fatty liver disease; who presented to [facility] on 11/20 for a scheduled TAVR, and possible stent placement.  She reports several month history of DUFF while walking and climbing up stairs, fatigue and BL LE edema. On 11/20 she underwent TAVR, drainage of retroperitoneal hematoma and pericardial window with Dr. Garland and Noah. Intraoperatively, she had an episode of Vtach (on Amiodarone).      Her hospital course was complicated by the following: acute hypoxic respiratory failure (requiring BiPAP and eventual intubation on 11/25), hemorrhagic shock, AFIB w/ RVR, coffee ground emesis, Code Blue (s/p CPR resulting in rib fractures), AIDA, fever secondary to aspiration PNA, +pseudomonas in sputum, persistent leukocytosis and low grade fevers (on Vanco per ID), L lateral abdominal wall hematoma, distended gallbladder and gallbladder sludge, dysphagia (s/p trache and PEG placement on 12/3), decannulation (12/13), L groin delayed wound healing (requiring wound vac), and hyperglycemia.  Patient now admitted for a multidisciplinary rehab program. 12-19-24 @ 13:25  -------------  Rehab Management/MEDICAL MANAGEMENT     #S/p TAVR  - Gait Instability, ADL impairments and Functional impairments: start Comprehensive Rehab Program of PT/OT/SLP  - 3 hours a day, 5 days a week  - P&O as needed   - ASA 81mg daily  - Plavix 75mg daily     #Acute Hypoxic Respiratory Failures  - PRN: Nebulizer QID   - on 1L NC; wean as tolerated as patient does not wear o2 at home.    #HTN  #AFIB w/ RVR  - Amiodarone 200mg daily   - Bumex 1mg daily   - Metoprolol 12.5mg BID     #Type 2 Diabetes Mellitus  - A1c: 6.4% (Dec 2024)    - FS AC & HS  - Mod ISS  - Lantus 12U HS    #Mood  - Escitalopram 20mg daily     #Skin  - Skin on admission: Trach site 1x0.5cm with scan tan drainage with gauze and tape, Mid sternum incision well approximated, healed 5.5cm, MUNIRA, abd bruising, perineal bruising, L groin incision [id number] incision, wet to dry packing with gauze, CDI, L flank bruising, generalized ecchymosis and scabbing, b/l dry feet   - Aquaphor BID   - Pressure injury/Skin: OOB to Chair, PT/OT     #Pain Mgmt   - PRN: Tylenol     #GI/Bowel Mgmt   - Pantoprazole  - Senna , Miralax    #/Bladder Mgmt   -  PVR q8h, CIC>400cc    #FEN   - Diet - Easy to Chew with mildly thick liquids   - Dysphagia  SLP - evaluation and treatment    #Precautions / PROPHYLAXIS:   - Falls, Cardiac, Sternal, Spinal, Seizure   - ortho: Weight bearing status: WBAT   - Lungs: Aspiration, Incentive Spirometer   - DVT: Heparin 5000 units BID    Next of Kin:   Daughter: Janie Lerma: [phone]  ----------------------------------------------  Dr. Bailey Liaison with Family/Providers:    -----------------------------------------------  OUTPATIENT/FOLLOW UP:    Todd Devine MD  Vascular surgery, General surgery  [address]  [address]  [place] [address]  [phone]    Todd Odom MD  Cardiology, Interventional  [facility]   [address]  [address]  [facility], [address] [phone]        -----------------------------------------------  MEDICAL PROGNOSIS: GOOD                                   REHAB POTENTIAL: GOOD  ESTIMATED DISPOSITION: HOME                             ELOS: 10-14 Days   EXPECTED THERAPY:     P.T. 1hr/day       O.T. 1hr/day      S.L.P. 1hr/day      EXP FREQUENCY: 5 days per 7 day period     PRESCREEN COMPARISON: I have reviewed the prescreen information and I have found no relevant changes between the preadmission screening and my post admission evaluation     RATIONALE FOR INPATIENT ADMISSION - Patient demonstrates the following: (check all that apply)  [X] Medically appropriate for rehabilitation admission  [X] Has attainable rehab goals with an appropriate initial discharge plan  [X] Has rehabilitation potential (expected to make a significant improvement within a reasonable period of time)   [X] Requires close medical managment by a rehab physician, rehab nursing care, Hospitalist and comprehensive interdisciplinary team (including PT, OT, & or SLP, Prosthetics and Orthotics)     Assessment/Plan:  FANI LERMA is a 79 year old, obese, female with PMH of DAYANA (on CPAP), aortic stenosis, HTN, HLD, current smoker, COPD, Type 2 Diabetes Mellitus, and non-alcoholic fatty liver disease; who presented to [facility] on 11/20 for a scheduled TAVR, and possible stent placement.  She reports several month history of DUFF while walking and climbing up stairs, fatigue and BL LE edema. On 11/20 she underwent TAVR, drainage of retroperitoneal hematoma and pericardial window with Dr. Garland and Noah. Intraoperatively, she had an episode of Vtach (on Amiodarone).      Her hospital course was complicated by the following: acute hypoxic respiratory failure (requiring BiPAP and eventual intubation on 11/25), hemorrhagic shock, AFIB w/ RVR, coffee ground emesis, Code Blue (s/p CPR resulting in rib fractures), AIDA, fever secondary to aspiration PNA, +pseudomonas in sputum, persistent leukocytosis and low grade fevers (on Vanco per ID), L lateral abdominal wall hematoma, distended gallbladder and gallbladder sludge, dysphagia (s/p trache and PEG placement on 12/3), decannulation (12/13), L groin delayed wound healing (requiring wound vac), and hyperglycemia.  Patient now admitted for a multidisciplinary rehab program. 12-19-24 @ 13:25  -------------  Rehab Management/MEDICAL MANAGEMENT     #S/p TAVR  - Gait Instability, ADL impairments and Functional impairments: start Comprehensive Rehab Program of PT/OT/SLP  - 3 hours a day, 5 days a week  - P&O as needed   - ASA 81mg daily  - Plavix 75mg daily     #Acute Hypoxic Respiratory Failures  - PRN: Nebulizer QID   - on 1L NC; wean as tolerated as patient does not wear o2 at home.    #HTN  #AFIB w/ RVR  - Amiodarone 200mg daily   - Bumex 1mg daily   - Metoprolol 12.5mg BID     #Type 2 Diabetes Mellitus  - A1c: 6.4% (Dec 2024)    - FS AC & HS  - Mod ISS  - Lantus 12U HS    #Mood  - Escitalopram 20mg daily     #Skin  - Skin on admission: Trach site 1x0.5cm with scan tan drainage with gauze and tape, Mid sternum incision well approximated, healed 5.5cm, MUNIRA, abd bruising, perineal bruising, L groin incision [id number] incision, wet to dry packing with gauze, CDI, L flank bruising, generalized ecchymosis and scabbing, b/l dry feet   - Aquaphor BID   - Pressure injury/Skin: OOB to Chair, PT/OT     #Pain Mgmt   - PRN: Tylenol     #GI/Bowel Mgmt   - Pantoprazole  - Senna , Miralax    #/Bladder Mgmt   -  PVR q8h, CIC>400cc    #FEN   - Diet - Easy to Chew with mildly thick liquids   - Dysphagia  SLP - evaluation and treatment    #Precautions / PROPHYLAXIS:   - Falls, Cardiac, Sternal, Spinal, Seizure   - ortho: Weight bearing status: WBAT   - Lungs: Aspiration, Incentive Spirometer   - DVT: ASA 81mg daily & Plavix 75mg daily, TEDs     Next of Kin:   Daughter: Janie Lerma: [phone]  ----------------------------------------------  Dr. Bailey Liaison with Family/Providers:    -----------------------------------------------  OUTPATIENT/FOLLOW UP:    Todd Devine MD  Vascular surgery, General surgery  [address]  [address]  [place] [address]  [phone]    Todd Odom MD  Cardiology, Interventional  [facility]   [address]  [address]  [facility], [address] [phone]        -----------------------------------------------  MEDICAL PROGNOSIS: GOOD                                   REHAB POTENTIAL: GOOD  ESTIMATED DISPOSITION: HOME                             ELOS: 10-14 Days   EXPECTED THERAPY:     P.T. 1hr/day       O.T. 1hr/day      S.L.P. 1hr/day      EXP FREQUENCY: 5 days per 7 day period     PRESCREEN COMPARISON: I have reviewed the prescreen information and I have found no relevant changes between the preadmission screening and my post admission evaluation     RATIONALE FOR INPATIENT ADMISSION - Patient demonstrates the following: (check all that apply)  [X] Medically appropriate for rehabilitation admission  [X] Has attainable rehab goals with an appropriate initial discharge plan  [X] Has rehabilitation potential (expected to make a significant improvement within a reasonable period of time)   [X] Requires close medical managment by a rehab physician, rehab nursing care, Hospitalist and comprehensive interdisciplinary team (including PT, OT, & or SLP, Prosthetics and Orthotics)     79 year old, obese, female with PMH of DAYANA (on CPAP), aortic stenosis, HTN, HLD, current smoker, COPD, Type 2 Diabetes Mellitus, and non-alcoholic fatty liver disease; who presented to [facility] on 11/20 for a scheduled TAVR, and possible stent placement.  She reports several month history of DUFF while walking and climbing up stairs, fatigue and BL LE edema. On 11/20 she underwent TAVR, drainage of retroperitoneal hematoma and pericardial window with Dr. Garland and Noah. Intraoperatively, she had an episode of Vtach (on Amiodarone).    Her hospital course was complicated by the following: acute hypoxic respiratory failure (requiring BiPAP and eventual intubation on 11/25), hemorrhagic shock, AFIB w/ RVR, coffee ground emesis, Code Blue (s/p CPR resulting in rib fractures), AIDA, fever secondary to aspiration PNA, +pseudomonas in sputum, persistent leukocytosis and low grade fevers (on Vanco per ID), L lateral abdominal wall hematoma, distended gallbladder and gallbladder sludge, dysphagia (s/p trache and PEG placement on 12/3), decannulation (12/13), L groin delayed wound healing (requiring wound vac), and hyperglycemia.  Patient now admitted for a multidisciplinary rehab program. 12-19-24 @ 13:25    * Lab results, unremarkable  * Left groin wound s/p recent wound vac in acute care--f/u wound care consult  * Dysphagia--tolerating oral feeds, PEG to continue in situ and f/u GI outpatient  (placed 12/3)   * Change dry dressing cover on trach site, every 2 days    #Aortic valve disease S/p TAVR  - Gait Instability, ADL impairments and Functional impairments:   Commence Comprehensive Rehab Program of PT/OT/SLP  - 3 hours a day, 5 days a week  - ASA 81mg daily  - Plavix 75mg daily     REHAB ISSUES  Decreased ambulatory distance  Left groin wound  Impaired balance  Resp failure     #Acute Hypoxic Respiratory Failures  - PRN: Nebulizer QID   - on 1L NC; wean as tolerated as patient does not wear o2 at home.    #HTN  #AFIB w/ RVR  - Amiodarone 200mg daily   - Bumex 1mg daily   - Metoprolol 12.5mg BID     #Type 2 Diabetes Mellitus  - A1c: 6.4% (Dec 2024)    - FS AC & HS  - Mod ISS  - Lantus 12U HS    #Mood  - Escitalopram 20mg daily     #Skin  - Skin -- Trach site 1x0.5cm with scan tan drainage with gauze and tape, Mid sternum incision well approximated, healed 5.5cm, MUNIRA, abd bruising, perineal bruising, L groin incision [id number] incision, wet to dry packing with gauze, CDI, L flank bruising, generalized ecchymosis and scabbing, b/l dry feet   - Aquaphor BID   - Pressure injury/Skin: OOB to Chair, PT/OT   -* Left groin wound s/p recent wound vac in acute care--f/u wound care consult      #Pain Mgmt   - PRN: Tylenol     #GI/Bowel Mgmt   - Pantoprazole  - Senna , Miralax    #/Bladder Mgmt --voiding     #FEN   - Diet - Easy to Chew with mildly thick liquids   - Dysphagia  SLP - evaluation and treatment  -* Dysphagia--tolerating oral feeds, PEG to continue in situ and f/u GI outpatient  (placed 12/3)     #Precautions / PROPHYLAXIS:   - Falls, Cardiac, Sternal, Spinal, Seizure   - ortho: Weight bearing status: WBAT   - Lungs: Aspiration, Incentive Spirometer   - DVT: ASA 81mg daily & Plavix 75mg daily, TEDs     Next of Kin:   Daughter: Janie Drake: [phone]  ----------------------------------------------  Dr. Bailey Liaison with Family/Providers:    -----------------------------------------------  OUTPATIENT/FOLLOW UP:    Todd Devine MD  Vascular surgery, General surgery  [address] [facility] [address] [facility]  [address]  [place] [address]  [phone]    Todd Odom MD  Cardiology, Interventional  [facility]   [address]  [address]  [facility] [place], [address] [phone]        -----------------------------------------------  MEDICAL PROGNOSIS: GOOD                                   REHAB POTENTIAL: GOOD  ESTIMATED DISPOSITION: HOME                             ELOS: 10-14 Days   EXPECTED THERAPY:     P.T. 1hr/day       O.T. 1hr/day      S.L.P. 1hr/day      EXP FREQUENCY: 5 days per 7 day period     PRESCREEN COMPARISON: I have reviewed the prescreen information and I have found no relevant changes between the preadmission screening and my post admission evaluation     RATIONALE FOR INPATIENT ADMISSION - Patient demonstrates the following: (check all that apply)  [X] Medically appropriate for rehabilitation admission  [X] Has attainable rehab goals with an appropriate initial discharge plan  [X] Has rehabilitation potential (expected to make a significant improvement within a reasonable period of time)   [X] Requires close medical managment by a rehab physician, rehab nursing care, Hospitalist and comprehensive interdisciplinary team (including PT, OT, & or SLP, Prosthetics and Orthotics)

## 2024-12-19 NOTE — H&P ADULT - NSHPLABSRESULTS_GEN_ALL_CORE
LABS:  WBC	 6.27  HgB	 9.1  Hct	 28.8  Platelets	 173     Glucose	 128   Sodium	 134  Potassium	 3.8  Blood Urea Nitrogen	 16  Creatinine	 0.54   Albumin	 2.6     Sputum Culture   12/1/24  Sputum noting Pseudomonas aeruginosa       IMAGING/RADIOLOGY:  12/13/2024 CT Brain without Contrast   IMPRESSION: 1. Ill-defined areas of low attenuation in the posterior occipital lobes bilaterally, right greater than left. This could represent posterior reversible encephalopathy. Further evaluation with MRI is recommended. 2. lntracranial atherosclerosis. 3. Chronic microangiopathic ischemic changes in the white matter.    12/12/24 CT brain w/o Contrast   MPRESSION: 1. Ill-defined areas of low attenuation in the posterior occipital lobes bilaterally, right greater than left. This could represent posterior reversible encephalopathy. Further evaluation with MRI is recommended. 2. lntracranial atherosclerosis. 3. Chronic microangiopathic ischemic changes in the white matter.    12/12/24 Chest Xray  IMPRESSION: Mildly improving pulmonary vascular congestion, small left pleural effusion, left base atelectasis.    12/2/24 CT Abdomen and Pelvis without Oral or IV Contrast  IMPRESSION: Small left greater than right bilateral pleural effusions with adjacent infiltrate at the left lung base. Diffuse interstitial/reticular nodular infiltrate noted bilaterally with interval improvement in comparison to prior study. Minimally displaced fractures of the right anterior fifth, and left left anterior fourth-seventh ribs. Redemonstration of asymmetrical enlargement and hyperattenuation within the left lateral abdominal wall musculature reflecting an intramuscular hematoma (sterile versus infected). No significant interval change in size in comparison to prior study. Diffuse atheromatous disease of the abdominal aorta and mesenteric vessels. Distended gallbladder containing gallstones/biliary sludge. Stable 2 cm left upper pole renal cyst. Tavarez catheter within a circumferentially thick walled urinary bladder. Left greater than right bilateral fat-containing inguinal hernias. Sigmoid diverticulosis without evidence of diverticulitis.    12/2/24 US Lower Extremity Venous Duplex Bilateral  IMPRESSION: No evidence for DVT in the bilateral lower extremity.    12/2/24 chest Xray   IMPRESSION: 1. Life­support devices as described. 2. Interval improvement in left basilar opacity.      CARDIOLOGY:  CV Echo 2D Complete  12/13/24   Summary: 1. Left ventricle: The cavity size is normal. Mildly increased thickness is present. There are no regional wall motion abnormalities present. Left ventricular ejection fraction is 67 .3%. The global longitudinal strain is -19.1 %. 2. Right ventricle: The cavity size is normal. Right ventricular systolic function is normal. 3. Mitral valve: The annulus is calcified and mildly stenotic. The leaflets are mildly thickened. There is trace mitral valve regurgitation. The mean diastolic gradient is 4.3 mm Hg. 4. Aortic valve: There is an Evolut FX+ 29mm transcatheter valve that is well seated in the CV   aortic valve position. There is no valvular aortic regurgitation. There is no paravalvular aortic regurgitation. The peak systolic velocity is 211 cm/sec. The peak systolic gradient is 18.0 mm Hg. The mean systolic gradient is 9.0 mm Hg. The LVOT to aortic valve VTI ratio is 0.65. The valve area by the velocity-time integral method is 2.03 cm"2. 5. Tricuspid. valve: The leaflets are normal thickness. There is trace tricuspid valve regurgitation. 6. Pericardium, extracardiac: There is no pericardia! effusion. A right pleural effusion is present.

## 2024-12-19 NOTE — PATIENT PROFILE ADULT - FALL HARM RISK - HARM RISK INTERVENTIONS

## 2024-12-19 NOTE — H&P ADULT - NSHPPHYSICALEXAM_GEN_ALL_CORE
PHYSICAL EXAM  VITALS  T(C): 36.8 (12-19-24 @ 16:57), Max: 36.8 (12-19-24 @ 16:57)  HR: 69 (12-19-24 @ 16:57) (69 - 69)  BP: 113/71 (12-19-24 @ 16:57) (113/71 - 113/71)  RR: 16 (12-19-24 @ 16:57) (16 - 16)  SpO2: 97% (12-19-24 @ 16:57) (97% - 97%)    Gen - NAD, Comfortable  HEENT - NCAT, EOMI, MMM, PERRLA, Normal Conjunctivae  Neck - Supple, No limited ROM  Pulm -+ rhonchi, +cough, on 1L NC  Cardiovascular - RRR, S1S2, No murmurs  Chest - good chest expansion, good respiratory effort  Abdomen - Soft, NT, +BS, Abd obese, +PEG tube  Extremities - No C/C/E, no calf tenderness  Neuro-     Cognitive - awake, alert, oriented to person, place, date, year, and situation.  Able  to follow command     Communication - Fluent, Comprehensible, No dysarthria, No aphasia      Cranial Nerves -No facial asymmetry, Tongue midline, EOMI, Shoulder shrug intact     Motor -                     LEFT    UE - ShAB 4/5, EF 5/5, EE 5/5,  5/5                    RIGHT UE - ShAB 3/5, EF 5/5, EE 5/5,   5/5                    LEFT    LE - HF 4/5, KE 5/5, DF 5/5, PF 5/5                    RIGHT LE - HF 3/5, KE 5/5, DF 5/5, PF 5/5        Sensory - Intact  to LT      Tone - Normal  Psychiatric - Mood stable, Affect WNL  Skin:  Trach site 1x0.5cm with scan tan drainage with gauze and tape, Mid sternum incision well approximated, healed 5.5cm, MUNIRA, abd bruising, perineal bruising, L groin incision [id number] incision, wet to dry packing with gauze, CDI, L flank bruising, generalized ecchymosis and scabbing, b/l dry feet PHYSICAL EXAM  VITALS  T(C): 36.8 (12-19-24 @ 16:57), Max: 36.8 (12-19-24 @ 16:57)  HR: 69 (12-19-24 @ 16:57) (69 - 69)  BP: 113/71 (12-19-24 @ 16:57) (113/71 - 113/71)  RR: 16 (12-19-24 @ 16:57) (16 - 16)  SpO2: 97% (12-19-24 @ 16:57) (97% - 97%)    Gen - NAD, Comfortable  HEENT - NCAT, EOMI, MMM, PERRLA, Normal Conjunctivae  Neck - Supple, No limited ROM  Pulm -+ rhonchi, +cough, on 1L NC  Cardiovascular - RRR, S1S2, No murmurs  Chest - good chest expansion, good respiratory effort  Abdomen - Soft, NT, +BS, Abd obese, +PEG tube  Extremities - No C/C/E, no calf tenderness  Neuro-     Cognitive - awake, alert, oriented to person, place, date, and situation.  Unable to recall year. Able  to follow command     Communication - Fluent, Comprehensible, No dysarthria, No aphasia      Cranial Nerves -No facial asymmetry, Tongue midline, EOMI, Shoulder shrug intact     Motor -                     LEFT    UE - ShAB 4/5, EF 5/5, EE 5/5,  5/5                    RIGHT UE - ShAB 3/5, EF 5/5, EE 5/5,   5/5                    LEFT    LE - HF 4/5, KE 5/5, DF 5/5, PF 5/5                    RIGHT LE - HF 3/5, KE 5/5, DF 5/5, PF 5/5        Sensory - Intact  to LT      Tone - Normal  Psychiatric - Mood stable, Affect WNL  Skin:  Trach site 1x0.5cm with scan tan drainage with gauze and tape, Mid sternum incision well approximated, healed 5.5cm, MUNIRA, abd bruising, perineal bruising, L groin incision [id number] incision, wet to dry packing with gauze, CDI, L flank bruising, generalized ecchymosis and scabbing, b/l dry feet

## 2024-12-20 LAB
A1C WITH ESTIMATED AVERAGE GLUCOSE RESULT: 5.3 % — SIGNIFICANT CHANGE UP (ref 4–5.6)
ALBUMIN SERPL ELPH-MCNC: 2.3 G/DL — LOW (ref 3.3–5)
ALP SERPL-CCNC: 224 U/L — HIGH (ref 40–120)
ALT FLD-CCNC: 34 U/L — SIGNIFICANT CHANGE UP (ref 10–45)
ANION GAP SERPL CALC-SCNC: 7 MMOL/L — SIGNIFICANT CHANGE UP (ref 5–17)
ANISOCYTOSIS BLD QL: SLIGHT — SIGNIFICANT CHANGE UP
AST SERPL-CCNC: 28 U/L — SIGNIFICANT CHANGE UP (ref 10–40)
BASOPHILS # BLD AUTO: 0 K/UL — SIGNIFICANT CHANGE UP (ref 0–0.2)
BASOPHILS NFR BLD AUTO: 0 % — SIGNIFICANT CHANGE UP (ref 0–2)
BILIRUB SERPL-MCNC: 0.6 MG/DL — SIGNIFICANT CHANGE UP (ref 0.2–1.2)
BUN SERPL-MCNC: 15 MG/DL — SIGNIFICANT CHANGE UP (ref 7–23)
CALCIUM SERPL-MCNC: 8.8 MG/DL — SIGNIFICANT CHANGE UP (ref 8.4–10.5)
CHLORIDE SERPL-SCNC: 98 MMOL/L — SIGNIFICANT CHANGE UP (ref 96–108)
CO2 SERPL-SCNC: 29 MMOL/L — SIGNIFICANT CHANGE UP (ref 22–31)
CREAT SERPL-MCNC: 0.74 MG/DL — SIGNIFICANT CHANGE UP (ref 0.5–1.3)
EGFR: 83 ML/MIN/1.73M2 — SIGNIFICANT CHANGE UP
EOSINOPHIL # BLD AUTO: 0.14 K/UL — SIGNIFICANT CHANGE UP (ref 0–0.5)
EOSINOPHIL NFR BLD AUTO: 2 % — SIGNIFICANT CHANGE UP (ref 0–6)
ESTIMATED AVERAGE GLUCOSE: 105 MG/DL — SIGNIFICANT CHANGE UP (ref 68–114)
GLUCOSE BLDC GLUCOMTR-MCNC: 107 MG/DL — HIGH (ref 70–99)
GLUCOSE BLDC GLUCOMTR-MCNC: 144 MG/DL — HIGH (ref 70–99)
GLUCOSE BLDC GLUCOMTR-MCNC: 166 MG/DL — HIGH (ref 70–99)
GLUCOSE BLDC GLUCOMTR-MCNC: 312 MG/DL — HIGH (ref 70–99)
GLUCOSE SERPL-MCNC: 179 MG/DL — HIGH (ref 70–99)
HCT VFR BLD CALC: 30.7 % — LOW (ref 34.5–45)
HGB BLD-MCNC: 9.8 G/DL — LOW (ref 11.5–15.5)
HYPOCHROMIA BLD QL: SLIGHT — SIGNIFICANT CHANGE UP
LYMPHOCYTES # BLD AUTO: 0.77 K/UL — LOW (ref 1–3.3)
LYMPHOCYTES # BLD AUTO: 11 % — LOW (ref 13–44)
MANUAL SMEAR VERIFICATION: SIGNIFICANT CHANGE UP
MCHC RBC-ENTMCNC: 31.4 PG — SIGNIFICANT CHANGE UP (ref 27–34)
MCHC RBC-ENTMCNC: 31.9 G/DL — LOW (ref 32–36)
MCV RBC AUTO: 98.4 FL — SIGNIFICANT CHANGE UP (ref 80–100)
MONOCYTES # BLD AUTO: 0.56 K/UL — SIGNIFICANT CHANGE UP (ref 0–0.9)
MONOCYTES NFR BLD AUTO: 8 % — SIGNIFICANT CHANGE UP (ref 2–14)
MYELOCYTES NFR BLD: 5 % — HIGH (ref 0–0)
NEUTROPHILS # BLD AUTO: 5.19 K/UL — SIGNIFICANT CHANGE UP (ref 1.8–7.4)
NEUTROPHILS NFR BLD AUTO: 72 % — SIGNIFICANT CHANGE UP (ref 43–77)
NEUTS BAND # BLD: 2 % — SIGNIFICANT CHANGE UP (ref 0–8)
NRBC # BLD: 0 /100 WBCS — SIGNIFICANT CHANGE UP (ref 0–0)
PLAT MORPH BLD: NORMAL — SIGNIFICANT CHANGE UP
PLATELET # BLD AUTO: 165 K/UL — SIGNIFICANT CHANGE UP (ref 150–400)
POTASSIUM SERPL-MCNC: 3.6 MMOL/L — SIGNIFICANT CHANGE UP (ref 3.5–5.3)
POTASSIUM SERPL-SCNC: 3.6 MMOL/L — SIGNIFICANT CHANGE UP (ref 3.5–5.3)
PROT SERPL-MCNC: 7.1 G/DL — SIGNIFICANT CHANGE UP (ref 6–8.3)
RBC # BLD: 3.12 M/UL — LOW (ref 3.8–5.2)
RBC # FLD: 18.8 % — HIGH (ref 10.3–14.5)
RBC BLD AUTO: ABNORMAL
SODIUM SERPL-SCNC: 134 MMOL/L — LOW (ref 135–145)
WBC # BLD: 7.01 K/UL — SIGNIFICANT CHANGE UP (ref 3.8–10.5)
WBC # FLD AUTO: 7.01 K/UL — SIGNIFICANT CHANGE UP (ref 3.8–10.5)

## 2024-12-20 PROCEDURE — 99223 1ST HOSP IP/OBS HIGH 75: CPT

## 2024-12-20 RX ADMIN — CLOPIDOGREL BISULFATE 75 MILLIGRAM(S): 75 TABLET, FILM COATED ORAL at 12:11

## 2024-12-20 RX ADMIN — Medication 81 MILLIGRAM(S): at 12:11

## 2024-12-20 RX ADMIN — INSULIN GLARGINE-YFGN 12 UNIT(S): 100 INJECTION, SOLUTION SUBCUTANEOUS at 21:54

## 2024-12-20 RX ADMIN — AMIODARONE HYDROCHLORIDE 200 MILLIGRAM(S): 200 TABLET ORAL at 05:28

## 2024-12-20 RX ADMIN — Medication 8: at 12:11

## 2024-12-20 RX ADMIN — Medication 1 APPLICATION(S): at 05:28

## 2024-12-20 RX ADMIN — ESCITALOPRAM OXALATE 20 MILLIGRAM(S): 10 TABLET ORAL at 21:54

## 2024-12-20 RX ADMIN — Medication 12.5 MILLIGRAM(S): at 05:27

## 2024-12-20 RX ADMIN — BUMETANIDE 1 MILLIGRAM(S): 2 TABLET ORAL at 05:27

## 2024-12-20 RX ADMIN — PANTOPRAZOLE 40 MILLIGRAM(S): 40 TABLET, DELAYED RELEASE ORAL at 05:27

## 2024-12-20 RX ADMIN — IPRATROPIUM BROMIDE AND ALBUTEROL SULFATE 3 MILLILITER(S): .5; 2.5 SOLUTION RESPIRATORY (INHALATION) at 09:05

## 2024-12-20 RX ADMIN — Medication 12.5 MILLIGRAM(S): at 18:08

## 2024-12-20 NOTE — DIETITIAN INITIAL EVALUATION ADULT - PERTINENT LABORATORY DATA
12-20    134[L]  |  98  |  15  ----------------------------<  179[H]  3.6   |  29  |  0.74    Ca    8.8      20 Dec 2024 09:53    TPro  7.1  /  Alb  2.3[L]  /  TBili  0.6  /  DBili  x   /  AST  28  /  ALT  34  /  AlkPhos  224[H]  12-20  POCT Blood Glucose.: 312 mg/dL (12-20-24 @ 12:04)  A1C with Estimated Average Glucose Result: 5.3 % (12-20-24 @ 05:57)

## 2024-12-20 NOTE — DIETITIAN INITIAL EVALUATION ADULT - ADD RECOMMEND
1. Change diet to consistent carbohydrate, Easy to chew + mild thick liquids  2. Add DASH/TLC with resolution of hyponatremia   3. Diet consistency per SLP- plan for MBS

## 2024-12-20 NOTE — DIETITIAN INITIAL EVALUATION ADULT - PERTINENT MEDS FT
MEDICATIONS  (STANDING):  albuterol/ipratropium for Nebulization 3 milliLiter(s) Nebulizer every 6 hours  aMIOdarone    Tablet 200 milliGRAM(s) Oral daily  AQUAPHOR (petrolatum Ointment) 1 Application(s) Topical two times a day  aspirin enteric coated 81 milliGRAM(s) Oral daily  buMETAnide 1 milliGRAM(s) Oral daily  clopidogrel Tablet 75 milliGRAM(s) Oral daily  dextrose 5%. 1000 milliLiter(s) (50 mL/Hr) IV Continuous <Continuous>  dextrose 5%. 1000 milliLiter(s) (100 mL/Hr) IV Continuous <Continuous>  dextrose 50% Injectable 25 Gram(s) IV Push once  dextrose 50% Injectable 12.5 Gram(s) IV Push once  dextrose 50% Injectable 25 Gram(s) IV Push once  escitalopram 20 milliGRAM(s) Oral at bedtime  glucagon  Injectable 1 milliGRAM(s) IntraMuscular once  insulin glargine Injectable (LANTUS) 12 Unit(s) SubCutaneous at bedtime  insulin lispro (ADMELOG) corrective regimen sliding scale   SubCutaneous three times a day before meals  insulin lispro (ADMELOG) corrective regimen sliding scale   SubCutaneous at bedtime  metoprolol tartrate 12.5 milliGRAM(s) Oral two times a day  pantoprazole    Tablet 40 milliGRAM(s) Oral before breakfast  polyethylene glycol 3350 17 Gram(s) Oral daily  senna 2 Tablet(s) Oral at bedtime    MEDICATIONS  (PRN):  acetaminophen     Tablet .. 650 milliGRAM(s) Oral every 6 hours PRN Mild Pain (1 - 3)  dextrose Oral Gel 15 Gram(s) Oral once PRN Blood Glucose LESS THAN 70 milliGRAM(s)/deciliter

## 2024-12-20 NOTE — CONSULT NOTE ADULT - ASSESSMENT
79 year old, obese, female with PMH of DAYANA (on CPAP), aortic stenosis, HTN, HLD, current smoker, COPD, Type 2 Diabetes Mellitus, and non-alcoholic fatty liver disease; who presented to [facility] on 11/20 for a scheduled TAVR, and possible stent placement.  She reports several month history of DUFF while walking and climbing up stairs, fatigue and BL LE edema. On 11/20 she underwent TAVR, drainage of retroperitoneal hematoma and pericardial window with Dr. Garland and Noah. Intraoperatively, she had an episode of Vtach (on Amiodarone).    Her hospital course was complicated by the following: acute hypoxic respiratory failure (requiring BiPAP and eventual intubation on 11/25), hemorrhagic shock, AFIB w/ RVR, coffee ground emesis, Code Blue (s/p CPR resulting in rib fractures), AIDA, fever secondary to aspiration PNA, +pseudomonas in sputum, persistent leukocytosis and low grade fevers (on Vanco per ID), L lateral abdominal wall hematoma, distended gallbladder and gallbladder sludge, dysphagia (s/p trache and PEG placement on 12/3), decannulation (12/13), L groin delayed wound healing (requiring wound vac), and hyperglycemia.  Patient now admitted for a multidisciplinary rehab program. 12-19-24 @ 13:25        #Aortic stenosis S/p TAVR  - Comprehensive Rehab Program of PT/OT/SLP  - ASA 81mg daily  - Plavix 75mg daily     #Acute Hypoxic Respiratory Failures  - multifactorial resolved  - decannulated 12/3  - PRN: Nebulizer QID   - on 1L NC; wean as tolerated as patient does not wear o2 at home.    #HTN  #AFIB w/ RVR  - Amiodarone 200mg daily   - Bumex 1mg daily   - Metoprolol 12.5mg BID     #Type 2 Diabetes Mellitus  - A1c: 6.4% (Dec 2024)    - FS AC & HS  - Mod ISS  - Lantus 12U HS    #DAYANA  #COPD  - Duo-neb q 6hrs    #Mood  - Escitalopram 20mg daily     #Dysphagia  - tolerating oral feeds,   - PEG to continue in situ and f/u GI outpatient  (placed 12/3)    #GI/Bowel Mgmt   - Pantoprazole  - Senna , Miralax         # GI ppx: Pantoprazole  # DVT: ASA 81mg daily & Plavix 75mg daily, TEDs      79 year old, obese, female with PMH of DAYANA (on CPAP), aortic stenosis, HTN, HLD, current smoker, COPD, Type 2 Diabetes Mellitus, and non-alcoholic fatty liver disease; who presented to [facility] on 11/20 for a scheduled TAVR, and possible stent placement.  She reports several month history of DUFF while walking and climbing up stairs, fatigue and BL LE edema. On 11/20 she underwent TAVR, drainage of retroperitoneal hematoma and pericardial window with Dr. Garland and Noah. Intraoperatively, she had an episode of Vtach (on Amiodarone).    Her hospital course was complicated by the following: acute hypoxic respiratory failure (requiring BiPAP and eventual intubation on 11/25), hemorrhagic shock, AFIB w/ RVR, coffee ground emesis, Code Blue (s/p CPR resulting in rib fractures), AIDA, fever secondary to aspiration PNA, +pseudomonas in sputum, persistent leukocytosis and low grade fevers (on Vanco per ID), L lateral abdominal wall hematoma, distended gallbladder and gallbladder sludge, dysphagia (s/p trache and PEG placement on 12/3), decannulation (12/13), L groin delayed wound healing (requiring wound vac), and hyperglycemia.  Patient now admitted for a multidisciplinary rehab program. 12-19-24 @ 13:25        #Aortic stenosis S/p TAVR  - Comprehensive Rehab Program of PT/OT/SLP  - ASA 81mg daily  - Plavix 75mg daily     #HTN  #AFIB w/ RVR  - Amiodarone 200mg daily   - Bumex 1mg daily   - Metoprolol 12.5mg BID     #Type 2 Diabetes Mellitus  - A1c: 6.4% (Dec 2024)    - FS AC & HS  - Mod ISS  - Lantus 12U HS    #DAYANA  #COPD  - Duo-neb q 6hrs      #Acute Hypoxic Respiratory Failures  - multifactorial, resolved  - decannulated 12/3  - PRN: Nebulizer QID   - on 1L NC; wean as tolerated as patient does not wear o2 at home.      #Mood  - Escitalopram 20mg daily     #Dysphagia  - tolerating oral feeds,   - PEG to continue in situ and f/u GI outpatient  (placed 12/3)    #GI/Bowel Mgmt   - Pantoprazole  - Senna , Miralax         # GI ppx: Pantoprazole  # DVT: ASA 81mg daily & Plavix 75mg daily, TEDs

## 2024-12-20 NOTE — DIETITIAN INITIAL EVALUATION ADULT - ORAL INTAKE PTA/DIET HISTORY
Pt with hx trach/PEG (12/3) during acute hospitalization at [facility]. Decannulated on 12/13. Diet advanced per SLP- MBS 2 days ago per pt's daughter: Easy to chew + mild thick liquids. Appetite consistently good. Hx DMII, A1c 5.3% (12/2024). NKFA.

## 2024-12-20 NOTE — DIETITIAN INITIAL EVALUATION ADULT - OTHER INFO
79 year old, obese, female with PMH of DAYANA (on CPAP), aortic stenosis, HTN, HLD, current smoker, COPD, Type 2 Diabetes Mellitus, and non-alcoholic fatty liver disease; who presented to [facility] on 11/20 for a scheduled TAVR, and possible stent placement.  She reports several month history of DUFF while walking and climbing up stairs, fatigue and BL LE edema. On 11/20 she underwent TAVR, drainage of retroperitoneal hematoma and pericardial window with Dr. Garland and Noah. Intraoperatively, she had an episode of Vtach (on Amiodarone).    Her hospital course was complicated by the following: acute hypoxic respiratory failure (requiring BiPAP and eventual intubation on 11/25), hemorrhagic shock, AFIB w/ RVR, coffee ground emesis, Code Blue (s/p CPR resulting in rib fractures), AIDA, fever secondary to aspiration PNA, +pseudomonas in sputum, persistent leukocytosis and low grade fevers (on Vanco per ID), L lateral abdominal wall hematoma, distended gallbladder and gallbladder sludge, dysphagia (s/p trache and PEG placement on 12/3), decannulation (12/13), L groin delayed wound healing (requiring wound vac), and hyperglycemia.    Pt seen this afternoon feeding herself during lunch, observed with good intake- appears slightly impulsive. Reports UBW 178lbs, .1lbs. Stable weight. No pressure ulcers, left groin wound. No edema. Pt denies N/V/D, constipation. Last BM today per pt. Pt seen by SLP today, maintain current diet and plan for MBS. Recommend adding consistent carbohydrate given hx DMII +hyperglycemia, defer DASH/TLC given hyponatremia.

## 2024-12-21 LAB
GLUCOSE BLDC GLUCOMTR-MCNC: 140 MG/DL — HIGH (ref 70–99)
GLUCOSE BLDC GLUCOMTR-MCNC: 148 MG/DL — HIGH (ref 70–99)
GLUCOSE BLDC GLUCOMTR-MCNC: 168 MG/DL — HIGH (ref 70–99)
GLUCOSE BLDC GLUCOMTR-MCNC: 215 MG/DL — HIGH (ref 70–99)

## 2024-12-21 PROCEDURE — 99232 SBSQ HOSP IP/OBS MODERATE 35: CPT

## 2024-12-21 RX ORDER — LIDOCAINE 50 MG/G
1 OINTMENT TOPICAL THREE TIMES A DAY
Refills: 0 | Status: DISCONTINUED | OUTPATIENT
Start: 2024-12-21 | End: 2025-01-07

## 2024-12-21 RX ADMIN — AMIODARONE HYDROCHLORIDE 200 MILLIGRAM(S): 200 TABLET ORAL at 05:52

## 2024-12-21 RX ADMIN — Medication 1 APPLICATION(S): at 05:53

## 2024-12-21 RX ADMIN — Medication 81 MILLIGRAM(S): at 12:00

## 2024-12-21 RX ADMIN — Medication 2: at 17:25

## 2024-12-21 RX ADMIN — INSULIN GLARGINE-YFGN 12 UNIT(S): 100 INJECTION, SOLUTION SUBCUTANEOUS at 22:13

## 2024-12-21 RX ADMIN — Medication 12.5 MILLIGRAM(S): at 17:28

## 2024-12-21 RX ADMIN — BUMETANIDE 1 MILLIGRAM(S): 2 TABLET ORAL at 05:52

## 2024-12-21 RX ADMIN — CLOPIDOGREL BISULFATE 75 MILLIGRAM(S): 75 TABLET, FILM COATED ORAL at 12:01

## 2024-12-21 RX ADMIN — PANTOPRAZOLE 40 MILLIGRAM(S): 40 TABLET, DELAYED RELEASE ORAL at 05:52

## 2024-12-21 RX ADMIN — ESCITALOPRAM OXALATE 20 MILLIGRAM(S): 10 TABLET ORAL at 22:13

## 2024-12-21 RX ADMIN — Medication 4: at 08:20

## 2024-12-21 RX ADMIN — Medication 12.5 MILLIGRAM(S): at 05:52

## 2024-12-21 RX ADMIN — Medication 1 APPLICATION(S): at 17:28

## 2024-12-21 NOTE — PROGRESS NOTE ADULT - SUBJECTIVE AND OBJECTIVE BOX
HPI:  Patient seen and examined, no acute overnight events. Slept well. Notes that rib pain tends to worsen with therapies and as the day goes on. Is currently spending time with visitors. No other complaints.   no chest pain, no N/V, no Fevers/Chills. No other new ROS  Has been tolerating rehabilitation program.      MEDICATIONS:  acetaminophen     Tablet .. 650 milliGRAM(s) Oral every 6 hours PRN  albuterol/ipratropium for Nebulization 3 milliLiter(s) Nebulizer every 6 hours  aMIOdarone    Tablet 200 milliGRAM(s) Oral daily  AQUAPHOR (petrolatum Ointment) 1 Application(s) Topical two times a day  aspirin enteric coated 81 milliGRAM(s) Oral daily  buMETAnide 1 milliGRAM(s) Oral daily  clopidogrel Tablet 75 milliGRAM(s) Oral daily  dextrose 5%. 1000 milliLiter(s) IV Continuous <Continuous>  dextrose 5%. 1000 milliLiter(s) IV Continuous <Continuous>  dextrose 50% Injectable 25 Gram(s) IV Push once  dextrose 50% Injectable 12.5 Gram(s) IV Push once  dextrose 50% Injectable 25 Gram(s) IV Push once  dextrose Oral Gel 15 Gram(s) Oral once PRN  escitalopram 20 milliGRAM(s) Oral at bedtime  glucagon  Injectable 1 milliGRAM(s) IntraMuscular once  insulin glargine Injectable (LANTUS) 12 Unit(s) SubCutaneous at bedtime  insulin lispro (ADMELOG) corrective regimen sliding scale   SubCutaneous three times a day before meals  insulin lispro (ADMELOG) corrective regimen sliding scale   SubCutaneous at bedtime  lidocaine 5% Ointment 1 Application(s) Topical three times a day PRN  metoprolol tartrate 12.5 milliGRAM(s) Oral two times a day  pantoprazole    Tablet 40 milliGRAM(s) Oral before breakfast  polyethylene glycol 3350 17 Gram(s) Oral daily  senna 2 Tablet(s) Oral at bedtime      VITALS:  T(C): 37 (12-20-24 @ 19:29), Max: 37 (12-20-24 @ 19:29)  HR: 73 (12-21-24 @ 05:52) (73 - 83)  BP: 152/75 (12-21-24 @ 05:52) (116/75 - 152/75)  RR: 16 (12-21-24 @ 05:52) (16 - 16)  SpO2: 95% (12-21-24 @ 05:52) (95% - 95%)    PHYSICAL EXAM:  In NAD  HEENT- NCAT  Heart- RRR, S1S2  Lungs- CTA bl.  Abd- + BS, NT, +PEG  Ext- No calf pain  Neuro- Exam unchanged  Skin:  Trach site 1x0.5cm with scan tan drainage with gauze and tape, Mid sternum incision well approximated, healed 5.5cm, MUNIRA, abd bruising, perineal bruising, L groin incision [id number] incision, wet to dry packing with gauze, CDI, L flank bruising, generalized ecchymosis and scabbing, b/l dry feet    RECENT LABS/IMAGING                          9.8    7.01  )-----------( 165      ( 20 Dec 2024 09:53 )             30.7     12-20    134[L]  |  98  |  15  ----------------------------<  179[H]  3.6   |  29  |  0.74    Ca    8.8      20 Dec 2024 09:53    TPro  7.1  /  Alb  2.3[L]  /  TBili  0.6  /  DBili  x   /  AST  28  /  ALT  34  /  AlkPhos  224[H]  12-20      Urinalysis Basic - ( 20 Dec 2024 09:53 )    Color: x / Appearance: x / SG: x / pH: x  Gluc: 179 mg/dL / Ketone: x  / Bili: x / Urobili: x   Blood: x / Protein: x / Nitrite: x   Leuk Esterase: x / RBC: x / WBC x   Sq Epi: x / Non Sq Epi: x / Bacteria: x      Labs, Radiology, Cardiology, and Other Results: LABS:  WBC	 6.27  HgB	 9.1  Hct	 28.8  Platelets	 173     Glucose	 128   Sodium	 134  Potassium	 3.8  Blood Urea Nitrogen	 16  Creatinine	 0.54   Albumin	 2.6     Sputum Culture   12/1/24  Sputum noting Pseudomonas aeruginosa       IMAGING/RADIOLOGY:  12/13/2024 CT Brain without Contrast   IMPRESSION: 1. Ill-defined areas of low attenuation in the posterior occipital lobes bilaterally, right greater than left. This could represent posterior reversible encephalopathy. Further evaluation with MRI is recommended. 2. lntracranial atherosclerosis. 3. Chronic microangiopathic ischemic changes in the white matter.    12/12/24 CT brain w/o Contrast   MPRESSION: 1. Ill-defined areas of low attenuation in the posterior occipital lobes bilaterally, right greater than left. This could represent posterior reversible encephalopathy. Further evaluation with MRI is recommended. 2. lntracranial atherosclerosis. 3. Chronic microangiopathic ischemic changes in the white matter.    12/12/24 Chest Xray  IMPRESSION: Mildly improving pulmonary vascular congestion, small left pleural effusion, left base atelectasis.    12/2/24 CT Abdomen and Pelvis without Oral or IV Contrast  IMPRESSION: Small left greater than right bilateral pleural effusions with adjacent infiltrate at the left lung base. Diffuse interstitial/reticular nodular infiltrate noted bilaterally with interval improvement in comparison to prior study. Minimally displaced fractures of the right anterior fifth, and left left anterior fourth-seventh ribs. Redemonstration of asymmetrical enlargement and hyperattenuation within the left lateral abdominal wall musculature reflecting an intramuscular hematoma (sterile versus infected). No significant interval change in size in comparison to prior study. Diffuse atheromatous disease of the abdominal aorta and mesenteric vessels. Distended gallbladder containing gallstones/biliary sludge. Stable 2 cm left upper pole renal cyst. Tavarez catheter within a circumferentially thick walled urinary bladder. Left greater than right bilateral fat-containing inguinal hernias. Sigmoid diverticulosis without evidence of diverticulitis.    12/2/24 US Lower Extremity Venous Duplex Bilateral  IMPRESSION: No evidence for DVT in the bilateral lower extremity.    12/2/24 chest Xray   IMPRESSION: 1. Life­support devices as described. 2. Interval improvement in left basilar opacity.      CARDIOLOGY:  CV Echo 2D Complete  12/13/24   Summary: 1. Left ventricle: The cavity size is normal. Mildly increased thickness is present. There are no regional wall motion abnormalities present. Left ventricular ejection fraction is 67 .3%. The global longitudinal strain is -19.1 %. 2. Right ventricle: The cavity size is normal. Right ventricular systolic function is normal. 3. Mitral valve: The annulus is calcified and mildly stenotic. The leaflets are mildly thickened. There is trace mitral valve regurgitation. The mean diastolic gradient is 4.3 mm Hg. 4. Aortic valve: There is an Evolut FX+ 29mm transcatheter valve that is well seated in the CV   aortic valve position. There is no valvular aortic regurgitation. There is no paravalvular aortic regurgitation. The peak systolic velocity is 211 cm/sec. The peak systolic gradient is 18.0 mm Hg. The mean systolic gradient is 9.0 mm Hg. The LVOT to aortic valve VTI ratio is 0.65. The valve area by the velocity-time integral method is 2.03 cm"2. 5. Tricuspid. valve: The leaflets are normal thickness. There is trace tricuspid valve regurgitation. 6. Pericardium, extracardiac: There is no pericardia! effusion. A right pleural effusion is present.        Imp: Patient with diagnosis of      aortic valve disease s/p TAVR    admitted for comprehensive acute rehabilitation.    Plan:  #Aortic valve disease S/p TAVR  - Continue PT/OT/SLP  - 3 hours a day, 5 days a week  - ASA 81mg daily  - Plavix 75mg daily     #Acute Hypoxic Respiratory Failures  - PRN: Nebulizer QID   - on 1L NC; wean as tolerated as patient does not wear o2 at home.    #HTN  #AFIB w/ RVR  - Amiodarone 200mg daily   - Bumex 1mg daily   - Metoprolol 12.5mg BID     #Type 2 Diabetes Mellitus  - A1c: 6.4% (Dec 2024)    - FS AC & HS  - Mod ISS  - Lantus 12U HS    #Mood  - Escitalopram 20mg daily     #Skin  - Skin -- Trach site 1x0.5cm with scan tan drainage with gauze and tape, Mid sternum incision well approximated, healed 5.5cm, MUNIRA, abd bruising, perineal bruising, L groin incision [id number] incision, wet to dry packing with gauze, CDI, L flank bruising, generalized ecchymosis and scabbing, b/l dry feet   - Aquaphor BID   - Pressure injury/Skin: OOB to Chair, PT/OT   -* Left groin wound s/p recent wound vac in acute care--f/u wound care consult    #Pain Mgmt   - PRN: Tylenol     #GI/Bowel Mgmt   - Pantoprazole  - Senna , Miralax    #/Bladder Mgmt --voiding     #FEN   - Diet - Easy to Chew with mildly thick liquids   - Dysphagia  SLP - evaluation and treatment  -* Dysphagia--tolerating oral feeds, PEG to continue in situ and f/u GI outpatient  (placed 12/3)     #Precautions / PROPHYLAXIS:   - Falls, Cardiac, Sternal, Spinal, Seizure   - ortho: Weight bearing status: WBAT   - Lungs: Aspiration, Incentive Spirometer   - DVT: ASA 81mg daily & Plavix 75mg daily, TEDs       - Continue current medications; patient medically stable.   - Patient is stable to continue current rehabilitation program.

## 2024-12-21 NOTE — PROGRESS NOTE ADULT - SUBJECTIVE AND OBJECTIVE BOX
CC: Patient is a 79y old  Female who presents with a chief complaint of Presence of xenogenic heart valve      Interval History:  Patient seen and examined at bedside.  No overnight events  No complaints this morning  Vitally stable    ALLERGIES:  penicillins (Hives)  Tetanus Immune Globulin (Unknown)  adhesives (Rash)  Typhoid Vaccines (Anaphylaxis)    MEDICATIONS  (STANDING):  albuterol/ipratropium for Nebulization 3 milliLiter(s) Nebulizer every 6 hours  Amiodarone    Tablet 200 milliGRAM(s) Oral daily  AQUAPHOR (petrolatum Ointment) 1 Application(s) Topical two times a day  aspirin enteric coated 81 milliGRAM(s) Oral daily  Bumetanide 1 milliGRAM(s) Oral daily  clopidogrel Tablet 75 milliGRAM(s) Oral daily  dextrose 5%. 1000 milliLiter(s) (50 mL/Hr) IV Continuous <Continuous>  dextrose 5%. 1000 milliLiter(s) (100 mL/Hr) IV Continuous <Continuous>  dextrose 50% Injectable 25 Gram(s) IV Push once  dextrose 50% Injectable 12.5 Gram(s) IV Push once  dextrose 50% Injectable 25 Gram(s) IV Push once  escitalopram 20 milliGRAM(s) Oral at bedtime  glucagon  Injectable 1 milliGRAM(s) IntraMuscular once  insulin glargine Injectable (LANTUS) 12 Unit(s) SubCutaneous at bedtime  insulin lispro (ADMELOG) corrective regimen sliding scale   SubCutaneous three times a day before meals  insulin lispro (ADMELOG) corrective regimen sliding scale   SubCutaneous at bedtime  metoprolol tartrate 12.5 milliGRAM(s) Oral two times a day  pantoprazole    Tablet 40 milliGRAM(s) Oral before breakfast  polyethylene glycol 3350 17 Gram(s) Oral daily  senna 2 Tablet(s) Oral at bedtime    MEDICATIONS  (PRN):  acetaminophen     Tablet .. 650 milliGRAM(s) Oral every 6 hours PRN Mild Pain (1 - 3)  dextrose Oral Gel 15 Gram(s) Oral once PRN Blood Glucose LESS THAN 70 milliGRAM(s)/deciliter    Vital Signs Last 24 Hrs  T(F): 98.6 (20 Dec 2024 19:29), Max: 98.6 (20 Dec 2024 19:29)  HR: 73 (21 Dec 2024 05:52) (73 - 83)  BP: 152/75 (21 Dec 2024 05:52) (116/75 - 152/75)  RR: 16 (21 Dec 2024 05:52) (16 - 16)  SpO2: 95% (21 Dec 2024 05:52) (95% - 95%)  I&O's Summary    BMI (kg/m2): 29.6 (12-19-24 @ 16:57)    PHYSICAL EXAM:  GENERAL: NAD, tracheostomy opening closed with dressing.  CHEST/LUNG: Clear to percussion bilaterally; No rales, rhonchi, wheezing, or rubs; normal respiratory effort, no intercostal retractions  HEART: Regular rate and rhythm; No murmurs, rubs, or gallops; No pitting edema  ABDOMEN: Soft, Nontender, Nondistended; Bowel sounds present; No HSM or masses  MUSCULOSKELETAL/EXTREMITIES:  2+ Peripheral Pulses, No clubbing or digital cyanosis; FROM of extremeties (pain, crepitation or contracture)  Skin: bruising over the anterolateral abdomen ( green colour  > old )    LABS:                        9.8    7.01  )-----------( 165      ( 20 Dec 2024 09:53 )             30.7       12-20    134  |  98  |  15  ----------------------------<  179  3.6   |  29  |  0.74    Ca    8.8      20 Dec 2024 09:53    TPro  7.1  /  Alb  2.3  /  TBili  0.6  /  DBili  x   /  AST  28  /  ALT  34  /  AlkPhos  224  12-20       POCT Blood Glucose.: 148 mg/dL (21 Dec 2024 11:59)  POCT Blood Glucose.: 215 mg/dL (21 Dec 2024 08:19)  POCT Blood Glucose.: 166 mg/dL (20 Dec 2024 21:53)  POCT Blood Glucose.: 107 mg/dL (20 Dec 2024 16:43)      Urinalysis Basic - ( 20 Dec 2024 09:53 )    Color: x / Appearance: x / SG: x / pH: x  Gluc: 179 mg/dL / Ketone: x  / Bili: x / Urobili: x   Blood: x / Protein: x / Nitrite: x   Leuk Esterase: x / RBC: x / WBC x   Sq Epi: x / Non Sq Epi: x / Bacteria: x        COVID-19 PCR: NotDetec (12-19-24 @ 17:29)      Care Discussed with Consultants/Other Providers: Yes

## 2024-12-21 NOTE — PROGRESS NOTE ADULT - ASSESSMENT
79 year old, obese, female with PMH of DAYANA (on CPAP), aortic stenosis, HTN, HLD, current smoker, COPD, Type 2 Diabetes Mellitus, and non-alcoholic fatty liver disease; who presented to [facility] on 11/20 for a scheduled TAVR, and possible stent placement.  She reports several month history of DUFF while walking and climbing up stairs, fatigue and BL LE edema. On 11/20 she underwent TAVR, drainage of retroperitoneal hematoma and pericardial window with Dr. Garland and Noah. Intraoperatively, she had an episode of Vtach (on Amiodarone).      Her hospital course was complicated by the following: acute hypoxic respiratory failure (requiring BiPAP and eventual intubation on 11/25), hemorrhagic shock, AFIB w/ RVR, coffee ground emesis, Code Blue (s/p CPR resulting in rib fractures), AIDA, fever secondary to aspiration PNA, +pseudomonas in sputum, persistent leukocytosis and low grade fevers (on Vanco per ID), L lateral abdominal wall hematoma, distended gallbladder and gallbladder sludge, dysphagia (s/p trache and PEG placement on 12/3), decannulation (12/13), L groin delayed wound healing (requiring wound vac), and hyperglycemia.  Patient now admitted for a multidisciplinary rehab program. 12-19-24 @ 13:25        #Aortic stenosis S/p TAVR  - Comprehensive Rehab Program of PT/OT/SLP  - ASA 81mg daily  - Plavix 75mg daily     #HTN  #AFIB w/ RVR  - Amiodarone 200mg daily   - Bumex 1mg daily   - Metoprolol 12.5mg BID     #Type 2 Diabetes Mellitus  - A1c: 6.4% (Dec 2024)    - FS AC & HS  - Mod ISS  - Lantus 12U HS    #DAYANA  #COPD  - Duo-neb q 6hrs      #Acute Hypoxic Respiratory Failures  - multifactorial, resolved  - decannulated 12/3  - PRN: Nebulizer QID   - on 1L NC; wean as tolerated as patient does not wear o2 at home.      #Mood  - Escitalopram 20mg daily     #Dysphagia  - tolerating oral feeds,   - PEG to continue in situ and f/u GI outpatient  (placed 12/3)    #GI/Bowel Mgmt   - Pantoprazole  - Senna , Miralax     # GI ppx: Pantoprazole  # DVT: ASA 81mg daily & Plavix 75mg daily, TEDs

## 2024-12-22 LAB
GLUCOSE BLDC GLUCOMTR-MCNC: 102 MG/DL — HIGH (ref 70–99)
GLUCOSE BLDC GLUCOMTR-MCNC: 134 MG/DL — HIGH (ref 70–99)
GLUCOSE BLDC GLUCOMTR-MCNC: 175 MG/DL — HIGH (ref 70–99)
GLUCOSE BLDC GLUCOMTR-MCNC: 184 MG/DL — HIGH (ref 70–99)

## 2024-12-22 PROCEDURE — 99232 SBSQ HOSP IP/OBS MODERATE 35: CPT

## 2024-12-22 RX ADMIN — ESCITALOPRAM OXALATE 20 MILLIGRAM(S): 10 TABLET ORAL at 21:25

## 2024-12-22 RX ADMIN — AMIODARONE HYDROCHLORIDE 200 MILLIGRAM(S): 200 TABLET ORAL at 05:21

## 2024-12-22 RX ADMIN — Medication 81 MILLIGRAM(S): at 12:01

## 2024-12-22 RX ADMIN — Medication 12.5 MILLIGRAM(S): at 05:21

## 2024-12-22 RX ADMIN — Medication 2: at 12:01

## 2024-12-22 RX ADMIN — BUMETANIDE 1 MILLIGRAM(S): 2 TABLET ORAL at 05:21

## 2024-12-22 RX ADMIN — CLOPIDOGREL BISULFATE 75 MILLIGRAM(S): 75 TABLET, FILM COATED ORAL at 12:02

## 2024-12-22 RX ADMIN — PANTOPRAZOLE 40 MILLIGRAM(S): 40 TABLET, DELAYED RELEASE ORAL at 05:22

## 2024-12-22 RX ADMIN — Medication 1 APPLICATION(S): at 17:10

## 2024-12-22 RX ADMIN — LIDOCAINE 1 APPLICATION(S): 50 OINTMENT TOPICAL at 14:40

## 2024-12-22 RX ADMIN — INSULIN GLARGINE-YFGN 12 UNIT(S): 100 INJECTION, SOLUTION SUBCUTANEOUS at 21:25

## 2024-12-22 RX ADMIN — Medication 12.5 MILLIGRAM(S): at 17:10

## 2024-12-22 NOTE — PROGRESS NOTE ADULT - ASSESSMENT
79 year old, obese, female with PMH of DAYANA (on CPAP), aortic stenosis, HTN, HLD, current smoker, COPD, Type 2 Diabetes Mellitus, and non-alcoholic fatty liver disease; who presented to [facility] on 11/20 for a scheduled TAVR, and possible stent placement.  She reports several month history of DUFF while walking and climbing up stairs, fatigue and BL LE edema. On 11/20 she underwent TAVR, drainage of retroperitoneal hematoma and pericardial window with Dr. Garland and Noah. Intraoperatively, she had an episode of Vtach (on Amiodarone).      Her hospital course was complicated by the following: acute hypoxic respiratory failure (requiring BiPAP and eventual intubation on 11/25), hemorrhagic shock, AFIB w/ RVR, coffee ground emesis, Code Blue (s/p CPR resulting in rib fractures), AIDA, fever secondary to aspiration PNA, +pseudomonas in sputum, persistent leukocytosis and low grade fevers (on Vanco per ID), L lateral abdominal wall hematoma, distended gallbladder and gallbladder sludge, dysphagia (s/p trache and PEG placement on 12/3), decannulation (12/13), L groin delayed wound healing (required wound vac), and hyperglycemia.  Patient now admitted for a multidisciplinary rehab program. 12-19-24 @ 13:25        #Aortic stenosis S/p TAVR  - Comprehensive Rehab Program of PT/OT/SLP  - ASA 81mg daily  - Plavix 75mg daily     #HTN  #AFIB w/ RVR  - Amiodarone 200mg daily   - Bumex 1mg daily   - Metoprolol 12.5mg BID   - Would start AC if okay by cardiology    #Type 2 Diabetes Mellitus  - A1c: 6.4% (Dec 2024)    - FS AC & HS  - Mod ISS  - Lantus 12U HS  - Monitor BGM    #DAYANA  #COPD  - Duo-neb q 6hrs      #Acute Hypoxic Respiratory Failures  - multifactorial, resolved  - decannulated 12/3  - PRN: Nebulizer QID   - on 1L NC; wean as tolerated as patient does not wear o2 at home.      #Mood  - Escitalopram 20mg daily     #Dysphagia  - tolerating oral feeds,   - PEG to continue in situ and f/u GI outpatient  (placed 12/3)    #GI/Bowel Mgmt   - Pantoprazole  - Senna , Miralax     # GI ppx: Pantoprazole  # DVT: ASA 81mg daily & Plavix 75mg daily, TEDs

## 2024-12-22 NOTE — PROGRESS NOTE ADULT - SUBJECTIVE AND OBJECTIVE BOX
HPI:  Patient seen and examined, no acute overnight events. Slept well. Notes that rib pain tends to worsen with therapies and as the day goes on. Is requesting permission to have ice chips   no chest pain, no N/V, no Fevers/Chills. No other new ROS  Has been tolerating rehabilitation program.      MEDICATIONS:  acetaminophen     Tablet .. 650 milliGRAM(s) Oral every 6 hours PRN  albuterol/ipratropium for Nebulization 3 milliLiter(s) Nebulizer every 6 hours  aMIOdarone    Tablet 200 milliGRAM(s) Oral daily  AQUAPHOR (petrolatum Ointment) 1 Application(s) Topical two times a day  aspirin enteric coated 81 milliGRAM(s) Oral daily  buMETAnide 1 milliGRAM(s) Oral daily  clopidogrel Tablet 75 milliGRAM(s) Oral daily  dextrose 5%. 1000 milliLiter(s) IV Continuous <Continuous>  dextrose 5%. 1000 milliLiter(s) IV Continuous <Continuous>  dextrose 50% Injectable 25 Gram(s) IV Push once  dextrose 50% Injectable 12.5 Gram(s) IV Push once  dextrose 50% Injectable 25 Gram(s) IV Push once  dextrose Oral Gel 15 Gram(s) Oral once PRN  escitalopram 20 milliGRAM(s) Oral at bedtime  glucagon  Injectable 1 milliGRAM(s) IntraMuscular once  insulin glargine Injectable (LANTUS) 12 Unit(s) SubCutaneous at bedtime  insulin lispro (ADMELOG) corrective regimen sliding scale   SubCutaneous three times a day before meals  insulin lispro (ADMELOG) corrective regimen sliding scale   SubCutaneous at bedtime  lidocaine 5% Ointment 1 Application(s) Topical three times a day PRN  metoprolol tartrate 12.5 milliGRAM(s) Oral two times a day  pantoprazole    Tablet 40 milliGRAM(s) Oral before breakfast  polyethylene glycol 3350 17 Gram(s) Oral daily  senna 2 Tablet(s) Oral at bedtime        VITALS  T(C): 36.6 (12-22-24 @ 08:39), Max: 36.8 (12-21-24 @ 17:46)  T(F): 97.9 (12-22-24 @ 08:39), Max: 98.2 (12-21-24 @ 17:46)  HR: 71 (12-22-24 @ 08:39) (71 - 82)  BP: 147/81 (12-22-24 @ 08:39) (141/72 - 149/79)  RR: 16 (12-22-24 @ 08:39) (16 - 16)  SpO2: 94% (12-22-24 @ 08:39) (94% - 96%)            PHYSICAL EXAM:  In NAD  HEENT- NCAT  Heart- RRR, S1S2  Lungs- CTA bl.  Abd- + BS, NT, +PEG  Ext- No calf pain  Neuro- Exam unchanged  Skin:  Trach site 1x0.5cm with scan tan drainage with gauze and tape, Mid sternum incision well approximated, healed 5.5cm, MUNIRA, abd bruising, perineal bruising, L groin incision [id number] incision, wet to dry packing with gauze, CDI, L flank bruising, generalized ecchymosis and scabbing, b/l dry feet    RECENT LABS/IMAGING                          9.8    7.01  )-----------( 165      ( 20 Dec 2024 09:53 )             30.7     12-20    134[L]  |  98  |  15  ----------------------------<  179[H]  3.6   |  29  |  0.74    Ca    8.8      20 Dec 2024 09:53    TPro  7.1  /  Alb  2.3[L]  /  TBili  0.6  /  DBili  x   /  AST  28  /  ALT  34  /  AlkPhos  224[H]  12-20      Urinalysis Basic - ( 20 Dec 2024 09:53 )    Color: x / Appearance: x / SG: x / pH: x  Gluc: 179 mg/dL / Ketone: x  / Bili: x / Urobili: x   Blood: x / Protein: x / Nitrite: x   Leuk Esterase: x / RBC: x / WBC x   Sq Epi: x / Non Sq Epi: x / Bacteria: x      Labs, Radiology, Cardiology, and Other Results: LABS:  WBC	 6.27  HgB	 9.1  Hct	 28.8  Platelets	 173     Glucose	 128   Sodium	 134  Potassium	 3.8  Blood Urea Nitrogen	 16  Creatinine	 0.54   Albumin	 2.6     Sputum Culture   12/1/24  Sputum noting Pseudomonas aeruginosa       IMAGING/RADIOLOGY:  12/13/2024 CT Brain without Contrast   IMPRESSION: 1. Ill-defined areas of low attenuation in the posterior occipital lobes bilaterally, right greater than left. This could represent posterior reversible encephalopathy. Further evaluation with MRI is recommended. 2. lntracranial atherosclerosis. 3. Chronic microangiopathic ischemic changes in the white matter.    12/12/24 CT brain w/o Contrast   MPRESSION: 1. Ill-defined areas of low attenuation in the posterior occipital lobes bilaterally, right greater than left. This could represent posterior reversible encephalopathy. Further evaluation with MRI is recommended. 2. lntracranial atherosclerosis. 3. Chronic microangiopathic ischemic changes in the white matter.    12/12/24 Chest Xray  IMPRESSION: Mildly improving pulmonary vascular congestion, small left pleural effusion, left base atelectasis.    12/2/24 CT Abdomen and Pelvis without Oral or IV Contrast  IMPRESSION: Small left greater than right bilateral pleural effusions with adjacent infiltrate at the left lung base. Diffuse interstitial/reticular nodular infiltrate noted bilaterally with interval improvement in comparison to prior study. Minimally displaced fractures of the right anterior fifth, and left left anterior fourth-seventh ribs. Redemonstration of asymmetrical enlargement and hyperattenuation within the left lateral abdominal wall musculature reflecting an intramuscular hematoma (sterile versus infected). No significant interval change in size in comparison to prior study. Diffuse atheromatous disease of the abdominal aorta and mesenteric vessels. Distended gallbladder containing gallstones/biliary sludge. Stable 2 cm left upper pole renal cyst. Tavarez catheter within a circumferentially thick walled urinary bladder. Left greater than right bilateral fat-containing inguinal hernias. Sigmoid diverticulosis without evidence of diverticulitis.    12/2/24 US Lower Extremity Venous Duplex Bilateral  IMPRESSION: No evidence for DVT in the bilateral lower extremity.    12/2/24 chest Xray   IMPRESSION: 1. Life­support devices as described. 2. Interval improvement in left basilar opacity.      CARDIOLOGY:  CV Echo 2D Complete  12/13/24   Summary: 1. Left ventricle: The cavity size is normal. Mildly increased thickness is present. There are no regional wall motion abnormalities present. Left ventricular ejection fraction is 67 .3%. The global longitudinal strain is -19.1 %. 2. Right ventricle: The cavity size is normal. Right ventricular systolic function is normal. 3. Mitral valve: The annulus is calcified and mildly stenotic. The leaflets are mildly thickened. There is trace mitral valve regurgitation. The mean diastolic gradient is 4.3 mm Hg. 4. Aortic valve: There is an Evolut FX+ 29mm transcatheter valve that is well seated in the CV   aortic valve position. There is no valvular aortic regurgitation. There is no paravalvular aortic regurgitation. The peak systolic velocity is 211 cm/sec. The peak systolic gradient is 18.0 mm Hg. The mean systolic gradient is 9.0 mm Hg. The LVOT to aortic valve VTI ratio is 0.65. The valve area by the velocity-time integral method is 2.03 cm"2. 5. Tricuspid. valve: The leaflets are normal thickness. There is trace tricuspid valve regurgitation. 6. Pericardium, extracardiac: There is no pericardia! effusion. A right pleural effusion is present.        Imp: Patient with diagnosis of      aortic valve disease s/p TAVR    admitted for comprehensive acute rehabilitation.    Plan:  #Aortic valve disease S/p TAVR  - Continue PT/OT/SLP - 3 hours a day, 5 days a week  - ASA 81mg daily  - Plavix 75mg daily     #Acute Hypoxic Respiratory Failures  - PRN: Nebulizer QID   - on 1L NC; wean as tolerated as patient does not wear o2 at home.    #HTN  #AFIB w/ RVR  - Amiodarone 200mg daily   - Bumex 1mg daily   - Metoprolol 12.5mg BID     #Type 2 Diabetes Mellitus  - A1c: 6.4% (Dec 2024)    - FS AC & HS  - Mod ISS  - Lantus 12U HS    #Mood  - Escitalopram 20mg daily     #Skin  - Skin -- Trach site 1x0.5cm with scan tan drainage with gauze and tape, Mid sternum incision well approximated, healed 5.5cm, MUNIRA, abd bruising, perineal bruising, L groin incision [id number] incision, wet to dry packing with gauze, CDI, L flank bruising, generalized ecchymosis and scabbing, b/l dry feet   - Aquaphor BID   - Pressure injury/Skin: OOB to Chair, PT/OT   -* Left groin wound s/p recent wound vac in acute care--f/u wound care consult    #Pain Mgmt   - PRN: Tylenol, lidocaine ointment     #GI/Bowel Mgmt   - Pantoprazole  - Senna , Miralax    #/Bladder Mgmt --voiding     #FEN   - Diet - Easy to Chew with mildly thick liquids   - Dysphagia  SLP - evaluation and treatment  -* Dysphagia--tolerating oral feeds, PEG to continue in situ and f/u GI outpatient  (placed 12/3)  - 12/22 per SLP, ok to have ice chips with supervision     #Precautions / PROPHYLAXIS:   - Falls, Cardiac, Sternal, Spinal, Seizure   - ortho: Weight bearing status: WBAT   - Lungs: Aspiration, Incentive Spirometer   - DVT: ASA 81mg daily & Plavix 75mg daily, TEDs       - Continue current medications; patient medically stable.   - Patient is stable to continue current rehabilitation program.

## 2024-12-22 NOTE — PROGRESS NOTE ADULT - SUBJECTIVE AND OBJECTIVE BOX
CC: Patient is a 79y old  Female who presents with a chief complaint of Aortic Valve Disease s/p TAVR    Interval History:  Patient seen and examined at bedside.  No overnight events  No complaints this morning  Vitally stable  BGM within acceptable range    ALLERGIES:  penicillins (Hives)  Tetanus Immune Globulin (Unknown)  adhesives (Rash)  Typhoid Vaccines (Anaphylaxis)    MEDICATIONS  (STANDING):  albuterol/ipratropium for Nebulization 3 milliLiter(s) Nebulizer every 6 hours  aMIOdarone    Tablet 200 milliGRAM(s) Oral daily  AQUAPHOR (petrolatum Ointment) 1 Application(s) Topical two times a day  aspirin enteric coated 81 milliGRAM(s) Oral daily  buMETAnide 1 milliGRAM(s) Oral daily  clopidogrel Tablet 75 milliGRAM(s) Oral daily  dextrose 5%. 1000 milliLiter(s) (50 mL/Hr) IV Continuous <Continuous>  dextrose 5%. 1000 milliLiter(s) (100 mL/Hr) IV Continuous <Continuous>  dextrose 50% Injectable 25 Gram(s) IV Push once  dextrose 50% Injectable 12.5 Gram(s) IV Push once  dextrose 50% Injectable 25 Gram(s) IV Push once  escitalopram 20 milliGRAM(s) Oral at bedtime  glucagon  Injectable 1 milliGRAM(s) IntraMuscular once  insulin glargine Injectable (LANTUS) 12 Unit(s) SubCutaneous at bedtime  insulin lispro (ADMELOG) corrective regimen sliding scale   SubCutaneous three times a day before meals  insulin lispro (ADMELOG) corrective regimen sliding scale   SubCutaneous at bedtime  metoprolol tartrate 12.5 milliGRAM(s) Oral two times a day  pantoprazole    Tablet 40 milliGRAM(s) Oral before breakfast  polyethylene glycol 3350 17 Gram(s) Oral daily  senna 2 Tablet(s) Oral at bedtime    MEDICATIONS  (PRN):  acetaminophen     Tablet .. 650 milliGRAM(s) Oral every 6 hours PRN Mild Pain (1 - 3)  dextrose Oral Gel 15 Gram(s) Oral once PRN Blood Glucose LESS THAN 70 milliGRAM(s)/deciliter  lidocaine 5% Ointment 1 Application(s) Topical three times a day PRN rib pain    Vital Signs Last 24 Hrs  T(F): 97.9 (22 Dec 2024 08:39), Max: 98.2 (21 Dec 2024 17:46)  HR: 71 (22 Dec 2024 08:39) (71 - 82)  BP: 147/81 (22 Dec 2024 08:39) (141/72 - 149/79)  RR: 16 (22 Dec 2024 08:39) (16 - 16)  SpO2: 94% (22 Dec 2024 08:39) (94% - 96%)  I&O's Summary    BMI (kg/m2): 29.6 (12-19-24 @ 16:57)    PHYSICAL EXAM:  GENERAL: NAD  CHEST/LUNG: Clear to percussion bilaterally; No rales, rhonchi, wheezing, or rubs; normal respiratory effort, no intercostal retractions  HEART: Regular rate and rhythm; No murmurs, rubs, or gallops; No pitting edema  ABDOMEN: Soft, Nontender, Nondistended; Bowel sounds present; No HSM or masses  MUSCULOSKELETAL/EXTREMITIES:  2+ Peripheral Pulses,  Skin, bruises at the anterolateral abdominal wall      LABS:                        9.8    7.01  )-----------( 165      ( 20 Dec 2024 09:53 )             30.7       12-20    134  |  98  |  15  ----------------------------<  179  3.6   |  29  |  0.74    Ca    8.8      20 Dec 2024 09:53    TPro  7.1  /  Alb  2.3  /  TBili  0.6  /  DBili  x   /  AST  28  /  ALT  34  /  AlkPhos  224  12-20     POCT Blood Glucose.: 184 mg/dL (22 Dec 2024 11:55)  POCT Blood Glucose.: 134 mg/dL (22 Dec 2024 08:15)  POCT Blood Glucose.: 140 mg/dL (21 Dec 2024 21:52)  POCT Blood Glucose.: 168 mg/dL (21 Dec 2024 16:47)      Urinalysis Basic - ( 20 Dec 2024 09:53 )    Color: x / Appearance: x / SG: x / pH: x  Gluc: 179 mg/dL / Ketone: x  / Bili: x / Urobili: x   Blood: x / Protein: x / Nitrite: x   Leuk Esterase: x / RBC: x / WBC x   Sq Epi: x / Non Sq Epi: x / Bacteria: x        COVID-19 PCR: NotDetec (12-19-24 @ 17:29)      Care Discussed with Consultants/Other Providers: Yes

## 2024-12-23 LAB
ALBUMIN SERPL ELPH-MCNC: 2.3 G/DL — LOW (ref 3.3–5)
ALP SERPL-CCNC: 204 U/L — HIGH (ref 40–120)
ALT FLD-CCNC: 30 U/L — SIGNIFICANT CHANGE UP (ref 10–45)
ANION GAP SERPL CALC-SCNC: 6 MMOL/L — SIGNIFICANT CHANGE UP (ref 5–17)
AST SERPL-CCNC: 21 U/L — SIGNIFICANT CHANGE UP (ref 10–40)
BASOPHILS # BLD AUTO: 0.02 K/UL — SIGNIFICANT CHANGE UP (ref 0–0.2)
BASOPHILS NFR BLD AUTO: 0.2 % — SIGNIFICANT CHANGE UP (ref 0–2)
BILIRUB SERPL-MCNC: 0.5 MG/DL — SIGNIFICANT CHANGE UP (ref 0.2–1.2)
BUN SERPL-MCNC: 16 MG/DL — SIGNIFICANT CHANGE UP (ref 7–23)
CALCIUM SERPL-MCNC: 8.8 MG/DL — SIGNIFICANT CHANGE UP (ref 8.4–10.5)
CHLORIDE SERPL-SCNC: 97 MMOL/L — SIGNIFICANT CHANGE UP (ref 96–108)
CO2 SERPL-SCNC: 34 MMOL/L — HIGH (ref 22–31)
CREAT SERPL-MCNC: 0.67 MG/DL — SIGNIFICANT CHANGE UP (ref 0.5–1.3)
EGFR: 90 ML/MIN/1.73M2 — SIGNIFICANT CHANGE UP
EOSINOPHIL # BLD AUTO: 0.1 K/UL — SIGNIFICANT CHANGE UP (ref 0–0.5)
EOSINOPHIL NFR BLD AUTO: 1.2 % — SIGNIFICANT CHANGE UP (ref 0–6)
GLUCOSE BLDC GLUCOMTR-MCNC: 126 MG/DL — HIGH (ref 70–99)
GLUCOSE BLDC GLUCOMTR-MCNC: 155 MG/DL — HIGH (ref 70–99)
GLUCOSE BLDC GLUCOMTR-MCNC: 157 MG/DL — HIGH (ref 70–99)
GLUCOSE BLDC GLUCOMTR-MCNC: 160 MG/DL — HIGH (ref 70–99)
GLUCOSE SERPL-MCNC: 111 MG/DL — HIGH (ref 70–99)
HCT VFR BLD CALC: 28.7 % — LOW (ref 34.5–45)
HGB BLD-MCNC: 9.1 G/DL — LOW (ref 11.5–15.5)
IMM GRANULOCYTES NFR BLD AUTO: 5.6 % — HIGH (ref 0–0.9)
LYMPHOCYTES # BLD AUTO: 1.1 K/UL — SIGNIFICANT CHANGE UP (ref 1–3.3)
LYMPHOCYTES # BLD AUTO: 13.2 % — SIGNIFICANT CHANGE UP (ref 13–44)
MCHC RBC-ENTMCNC: 31.4 PG — SIGNIFICANT CHANGE UP (ref 27–34)
MCHC RBC-ENTMCNC: 31.7 G/DL — LOW (ref 32–36)
MCV RBC AUTO: 99 FL — SIGNIFICANT CHANGE UP (ref 80–100)
MONOCYTES # BLD AUTO: 0.57 K/UL — SIGNIFICANT CHANGE UP (ref 0–0.9)
MONOCYTES NFR BLD AUTO: 6.9 % — SIGNIFICANT CHANGE UP (ref 2–14)
NEUTROPHILS # BLD AUTO: 6.06 K/UL — SIGNIFICANT CHANGE UP (ref 1.8–7.4)
NEUTROPHILS NFR BLD AUTO: 72.9 % — SIGNIFICANT CHANGE UP (ref 43–77)
NRBC # BLD: 0 /100 WBCS — SIGNIFICANT CHANGE UP (ref 0–0)
PLATELET # BLD AUTO: 190 K/UL — SIGNIFICANT CHANGE UP (ref 150–400)
POTASSIUM SERPL-MCNC: 3.4 MMOL/L — LOW (ref 3.5–5.3)
POTASSIUM SERPL-SCNC: 3.4 MMOL/L — LOW (ref 3.5–5.3)
PROT SERPL-MCNC: 7.1 G/DL — SIGNIFICANT CHANGE UP (ref 6–8.3)
RBC # BLD: 2.9 M/UL — LOW (ref 3.8–5.2)
RBC # FLD: 19 % — HIGH (ref 10.3–14.5)
SODIUM SERPL-SCNC: 137 MMOL/L — SIGNIFICANT CHANGE UP (ref 135–145)
WBC # BLD: 8.32 K/UL — SIGNIFICANT CHANGE UP (ref 3.8–10.5)
WBC # FLD AUTO: 8.32 K/UL — SIGNIFICANT CHANGE UP (ref 3.8–10.5)

## 2024-12-23 PROCEDURE — 74230 X-RAY XM SWLNG FUNCJ C+: CPT | Mod: 26

## 2024-12-23 PROCEDURE — 99232 SBSQ HOSP IP/OBS MODERATE 35: CPT

## 2024-12-23 PROCEDURE — 71045 X-RAY EXAM CHEST 1 VIEW: CPT | Mod: 26

## 2024-12-23 RX ORDER — GUAIFENESIN 100 MG/5ML
600 SYRUP ORAL EVERY 12 HOURS
Refills: 0 | Status: DISCONTINUED | OUTPATIENT
Start: 2024-12-23 | End: 2025-01-07

## 2024-12-23 RX ORDER — POTASSIUM CHLORIDE 600 MG/1
40 TABLET, FILM COATED, EXTENDED RELEASE ORAL ONCE
Refills: 0 | Status: COMPLETED | OUTPATIENT
Start: 2024-12-23 | End: 2024-12-23

## 2024-12-23 RX ORDER — ENOXAPARIN SODIUM 60 MG/.6ML
40 INJECTION INTRAVENOUS; SUBCUTANEOUS EVERY 24 HOURS
Refills: 0 | Status: DISCONTINUED | OUTPATIENT
Start: 2024-12-23 | End: 2024-12-27

## 2024-12-23 RX ORDER — ALBUTEROL SULFATE 90 UG/1
2.5 INHALANT RESPIRATORY (INHALATION) EVERY 8 HOURS
Refills: 0 | Status: DISCONTINUED | OUTPATIENT
Start: 2024-12-23 | End: 2024-12-23

## 2024-12-23 RX ADMIN — ENOXAPARIN SODIUM 40 MILLIGRAM(S): 60 INJECTION INTRAVENOUS; SUBCUTANEOUS at 17:13

## 2024-12-23 RX ADMIN — IPRATROPIUM BROMIDE AND ALBUTEROL SULFATE 3 MILLILITER(S): .5; 2.5 SOLUTION RESPIRATORY (INHALATION) at 22:10

## 2024-12-23 RX ADMIN — INSULIN GLARGINE-YFGN 12 UNIT(S): 100 INJECTION, SOLUTION SUBCUTANEOUS at 21:39

## 2024-12-23 RX ADMIN — POTASSIUM CHLORIDE 40 MILLIEQUIVALENT(S): 600 TABLET, FILM COATED, EXTENDED RELEASE ORAL at 11:40

## 2024-12-23 RX ADMIN — CLOPIDOGREL BISULFATE 75 MILLIGRAM(S): 75 TABLET, FILM COATED ORAL at 11:40

## 2024-12-23 RX ADMIN — BUMETANIDE 1 MILLIGRAM(S): 2 TABLET ORAL at 05:34

## 2024-12-23 RX ADMIN — LIDOCAINE 1 APPLICATION(S): 50 OINTMENT TOPICAL at 13:41

## 2024-12-23 RX ADMIN — Medication 1 APPLICATION(S): at 05:22

## 2024-12-23 RX ADMIN — AMIODARONE HYDROCHLORIDE 200 MILLIGRAM(S): 200 TABLET ORAL at 05:34

## 2024-12-23 RX ADMIN — PANTOPRAZOLE 40 MILLIGRAM(S): 40 TABLET, DELAYED RELEASE ORAL at 05:34

## 2024-12-23 RX ADMIN — Medication 81 MILLIGRAM(S): at 11:40

## 2024-12-23 RX ADMIN — Medication 2: at 17:14

## 2024-12-23 RX ADMIN — ESCITALOPRAM OXALATE 20 MILLIGRAM(S): 10 TABLET ORAL at 21:39

## 2024-12-23 RX ADMIN — IPRATROPIUM BROMIDE AND ALBUTEROL SULFATE 3 MILLILITER(S): .5; 2.5 SOLUTION RESPIRATORY (INHALATION) at 14:25

## 2024-12-23 RX ADMIN — Medication 1 APPLICATION(S): at 17:15

## 2024-12-23 RX ADMIN — Medication 12.5 MILLIGRAM(S): at 05:34

## 2024-12-23 RX ADMIN — Medication 2: at 11:41

## 2024-12-23 RX ADMIN — Medication 600 MILLIGRAM(S): at 17:15

## 2024-12-23 RX ADMIN — Medication 12.5 MILLIGRAM(S): at 17:15

## 2024-12-23 NOTE — PROGRESS NOTE ADULT - SUBJECTIVE AND OBJECTIVE BOX
PROGRESS NOTE:     Patient is a 79y old  Female who presents with a chief complaint of Aortic Valve Disease s/p TAVR (23 Dec 2024 11:32)          SUBJECTIVE & OBJECTIVE:   Pt seen and examined at bedside in AM    no overnight events.     REVIEW OF SYSTEMS: remaining ROS negative     PHYSICAL EXAM:  VITALS:  Vital Signs Last 24 Hrs  T(C): 36.4 (23 Dec 2024 07:13), Max: 36.8 (22 Dec 2024 19:42)  T(F): 97.5 (23 Dec 2024 07:13), Max: 98.2 (22 Dec 2024 19:42)  HR: 76 (23 Dec 2024 14:25) (71 - 84)  BP: 138/77 (23 Dec 2024 07:13) (126/75 - 138/77)  BP(mean): --  RR: 16 (23 Dec 2024 07:13) (16 - 16)  SpO2: 95% (23 Dec 2024 14:25) (94% - 95%)    Parameters below as of 23 Dec 2024 07:13  Patient On (Oxygen Delivery Method): room air          GENERAL: NAD,  no increased WOB  HEAD:  Atraumatic, Normocephalic  EYES: EOMI,  conjunctiva and sclera clear  ENMT: Moist mucous membranes  NECK: Supple, No JVD  NERVOUS SYSTEM:  Alert & Oriented   CHEST/LUNG: Clear to auscultation bilaterally; No rales, rhonchi, wheezing  HEART: Regular rate and rhythm  ABDOMEN: Soft, Nontender, Nondistended; Bowel sounds present  EXTREMITIES: No clubbing, cyanosis      MEDICATIONS  (STANDING):  albuterol/ipratropium for Nebulization 3 milliLiter(s) Nebulizer every 6 hours  aMIOdarone    Tablet 200 milliGRAM(s) Oral daily  AQUAPHOR (petrolatum Ointment) 1 Application(s) Topical two times a day  aspirin enteric coated 81 milliGRAM(s) Oral daily  buMETAnide 1 milliGRAM(s) Oral daily  clopidogrel Tablet 75 milliGRAM(s) Oral daily  dextrose 5%. 1000 milliLiter(s) (50 mL/Hr) IV Continuous <Continuous>  dextrose 5%. 1000 milliLiter(s) (100 mL/Hr) IV Continuous <Continuous>  dextrose 50% Injectable 25 Gram(s) IV Push once  dextrose 50% Injectable 12.5 Gram(s) IV Push once  dextrose 50% Injectable 25 Gram(s) IV Push once  escitalopram 20 milliGRAM(s) Oral at bedtime  glucagon  Injectable 1 milliGRAM(s) IntraMuscular once  guaiFENesin  milliGRAM(s) Oral every 12 hours  insulin glargine Injectable (LANTUS) 12 Unit(s) SubCutaneous at bedtime  insulin lispro (ADMELOG) corrective regimen sliding scale   SubCutaneous three times a day before meals  insulin lispro (ADMELOG) corrective regimen sliding scale   SubCutaneous at bedtime  metoprolol tartrate 12.5 milliGRAM(s) Oral two times a day  pantoprazole    Tablet 40 milliGRAM(s) Oral before breakfast  polyethylene glycol 3350 17 Gram(s) Oral daily  senna 2 Tablet(s) Oral at bedtime    MEDICATIONS  (PRN):  acetaminophen     Tablet .. 650 milliGRAM(s) Oral every 6 hours PRN Mild Pain (1 - 3)  dextrose Oral Gel 15 Gram(s) Oral once PRN Blood Glucose LESS THAN 70 milliGRAM(s)/deciliter  lidocaine 5% Ointment 1 Application(s) Topical three times a day PRN rib pain      Allergies    penicillins (Hives)  Tetanus Immune Globulin (Unknown)  adhesives (Rash)  Typhoid Vaccines (Anaphylaxis)    Intolerances              LABS:                           9.1    8.32  )-----------( 190      ( 23 Dec 2024 06:20 )             28.7     12-23    137  |  97  |  16  ----------------------------<  111[H]  3.4[L]   |  34[H]  |  0.67    Ca    8.8      23 Dec 2024 06:20    TPro  7.1  /  Alb  2.3[L]  /  TBili  0.5  /  DBili  x   /  AST  21  /  ALT  30  /  AlkPhos  204[H]  12-23      Urinalysis Basic - ( 23 Dec 2024 06:20 )    Color: x / Appearance: x / SG: x / pH: x  Gluc: 111 mg/dL / Ketone: x  / Bili: x / Urobili: x   Blood: x / Protein: x / Nitrite: x   Leuk Esterase: x / RBC: x / WBC x   Sq Epi: x / Non Sq Epi: x / Bacteria: x      CAPILLARY BLOOD GLUCOSE      POCT Blood Glucose.: 160 mg/dL (23 Dec 2024 11:37)  POCT Blood Glucose.: 126 mg/dL (23 Dec 2024 08:09)  POCT Blood Glucose.: 175 mg/dL (22 Dec 2024 21:24)  POCT Blood Glucose.: 102 mg/dL (22 Dec 2024 16:38)                RECENT CULTURES:          RADIOLOGY & ADDITIONAL TESTS:

## 2024-12-23 NOTE — PROGRESS NOTE ADULT - ASSESSMENT
79 year old, obese, female with PMH of DAYANA (on CPAP), aortic stenosis, HTN, HLD, current smoker, COPD, Type 2 Diabetes Mellitus, and non-alcoholic fatty liver disease; who presented to [facility] on 11/20 for a scheduled TAVR, and possible stent placement.  She reports several month history of DUFF while walking and climbing up stairs, fatigue and BL LE edema. On 11/20 she underwent TAVR, drainage of retroperitoneal hematoma and pericardial window with Dr. Garland and Noah. Intraoperatively, she had an episode of Vtach (on Amiodarone).    Her hospital course was complicated by the following: acute hypoxic respiratory failure (requiring BiPAP and eventual intubation on 11/25), hemorrhagic shock, AFIB w/ RVR, coffee ground emesis, Code Blue (s/p CPR resulting in rib fractures), AIDA, fever secondary to aspiration PNA, +pseudomonas in sputum, persistent leukocytosis and low grade fevers (on Vanco per ID), L lateral abdominal wall hematoma, distended gallbladder and gallbladder sludge, dysphagia (s/p trache and PEG placement on 12/3), decannulation (12/13), L groin delayed wound healing (requiring wound vac), and hyperglycemia.  Patient now admitted for a multidisciplinary rehab program. 12-19-24 @ 13:25    * Left groin wound s/p recent wound vac in acute care--f/u wound care consult  * Dysphagia - tolerating oral feeds, PEG to continue in situ and f/u GI outpatient  (placed 12/3)   * Change dry dressing cover on trach site, every 2 days  * Cough - await CXR - continue nebs - start chest PT and Mucinex     #Aortic valve disease S/p TAVR  - Gait Instability, ADL impairments and Functional impairments:   Commence Comprehensive Rehab Program of PT/OT/SLP  - 3 hours a day, 5 days a week  - ASA 81mg daily  - Plavix 75mg daily     REHAB ISSUES  Decreased ambulatory distance  Left groin wound  Impaired balance  Resp failure     #Acute Hypoxic Respiratory Failures  #Cough  - PRN: Nebulizer QID   - on 1L NC; wean as tolerated as patient does not wear o2 at home.  - Await CXR  - Start Mucinex BID  - Chest PT     #HTN  #AFIB w/ RVR  - Amiodarone 200mg daily   - Bumex 1mg daily   - Metoprolol 12.5mg BID     #Type 2 Diabetes Mellitus  - A1c: 6.4% (Dec 2024)    - FS AC & HS  - Mod ISS  - Lantus 12U HS    #Mood  - Escitalopram 20mg daily     #Skin  - Skin -- Trach site 1x0.5cm with scan tan drainage with gauze and tape, Mid sternum incision well approximated, healed 5.5cm, MUNIRA, abd bruising, perineal bruising, L groin incision [id number] incision, wet to dry packing with gauze, CDI, L flank bruising, generalized ecchymosis and scabbing, b/l dry feet   - Aquaphor BID   - Pressure injury/Skin: OOB to Chair, PT/OT   - Left groin wound s/p recent wound vac in acute care--f/u wound care consult    #Pain Mgmt   - PRN: Tylenol     #GI/Bowel Mgmt   - Pantoprazole  - Senna , Miralax    #/Bladder Mgmt --voiding     #FEN   - Diet - Easy to Chew with mildly thick liquids   - Dysphagia  SLP - evaluation and treatment  -* Dysphagia--tolerating oral feeds, PEG to continue in situ and f/u GI outpatient  (placed 12/3)     #Precautions / PROPHYLAXIS:   - Falls, Cardiac, Sternal, Spinal, Seizure   - ortho: Weight bearing status: WBAT   - Lungs: Aspiration, Incentive Spirometer   - DVT: ASA 81mg daily & Plavix 75mg daily, TEDs   ----------------------------------------------  Next of Kin:   Daughter: Janie Drake: [phone]  ----------------------------------------------  Dr. Bailey Liaison with Family/Providers:    -----------------------------------------------  OUTPATIENT/FOLLOW UP:    Todd Devine MD  Vascular surgery, General surgery  [address]  [address]  [place] [address]  [phone]    Todd Odom MD  Cardiology, Interventional  [facility]   [address]  [address]  [facility] [place] [facility], [address] [phone]  ----------------------------------------------- 79 year old, obese, female with PMH of DAYANA (on CPAP), aortic stenosis, HTN, HLD, current smoker, COPD, Type 2 Diabetes Mellitus, and non-alcoholic fatty liver disease; who presented to [facility] on 11/20 for a scheduled TAVR, and possible stent placement.  She reports several month history of DUFF while walking and climbing up stairs, fatigue and BL LE edema. On 11/20 she underwent TAVR, drainage of retroperitoneal hematoma and pericardial window with Dr. Garland and Noah. Intraoperatively, she had an episode of Vtach (on Amiodarone).    Her hospital course was complicated by the following: acute hypoxic respiratory failure (requiring BiPAP and eventual intubation on 11/25), hemorrhagic shock, AFIB w/ RVR, coffee ground emesis, Code Blue (s/p CPR resulting in rib fractures), AIDA, fever secondary to aspiration PNA, +pseudomonas in sputum, persistent leukocytosis and low grade fevers (on Vanco per ID), L lateral abdominal wall hematoma, distended gallbladder and gallbladder sludge, dysphagia (s/p trache and PEG placement on 12/3), decannulation (12/13), L groin delayed wound healing (requiring wound vac), and hyperglycemia.  Patient now admitted for a multidisciplinary rehab program. 12-19-24 @ 13:25    * Left groin wound s/p recent wound vac in acute care--f/u wound care consult  * Dysphagia - tolerating oral feeds, PEG to continue in situ and f/u GI outpatient  (placed 12/3)   * Change dry dressing cover on trach site, every 2 days  * Cough - await CXR - continue nebs - start chest PT and Mucinex     #Aortic valve disease S/p TAVR  - Gait Instability, ADL impairments and Functional impairments:   Commence Comprehensive Rehab Program of PT/OT/SLP  - 3 hours a day, 5 days a week  - ASA 81mg daily  - Plavix 75mg daily     REHAB ISSUES  Decreased ambulatory distance  Left groin wound  Impaired balance  Resp failure     #Acute Hypoxic Respiratory Failures  #Cough  - PRN: Nebulizer QID   - on 1L NC; wean as tolerated as patient does not wear o2 at home.  - Await CXR  - Start Mucinex BID  - Chest PT     #HTN  #AFIB w/ RVR  - Amiodarone 200mg daily   - Bumex 1mg daily   - Metoprolol 12.5mg BID     #Type 2 Diabetes Mellitus  - A1c: 6.4% (Dec 2024)    - FS AC & HS  - Mod ISS  - Lantus 12U HS    #Mood  - Escitalopram 20mg daily     #Skin  - Skin -- Trach site 1x0.5cm with scan tan drainage with gauze and tape, Mid sternum incision well approximated, healed 5.5cm, MUNIRA, abd bruising, perineal bruising, L groin incision [id number] incision, wet to dry packing with gauze, CDI, L flank bruising, generalized ecchymosis and scabbing, b/l dry feet   - Aquaphor BID   - Pressure injury/Skin: OOB to Chair, PT/OT   - Left groin wound s/p recent wound vac in acute care--f/u wound care consult    #Pain Mgmt   - PRN: Tylenol     #GI/Bowel Mgmt   - Pantoprazole  - Senna , Miralax    #/Bladder Mgmt --voiding     #FEN   - Diet - Regular with Thin Liquids   - Dysphagia  SLP - evaluation and treatment  - S/p MBS on 12/23 - passed, diet upgraded (as above)  - Dysphagia--tolerating oral feeds, PEG to continue in situ and f/u GI outpatient  (placed 12/3)     #Precautions / PROPHYLAXIS:   - Falls, Cardiac  - ortho: Weight bearing status: WBAT   - Lungs: Aspiration, Incentive Spirometer   - DVT: ASA 81mg daily & Plavix 75mg daily, TEDs   ----------------------------------------------  Next of Kin:   Daughter: Janie Drake: [phone]  ----------------------------------------------  Dr. Bailey Liaison with Family/Providers:    -----------------------------------------------  OUTPATIENT/FOLLOW UP:    Todd Devine MD  Vascular surgery, General surgery  [address]  [address]  [place] [address]  [phone]    Todd Odom MD  Cardiology, Interventional  [facility]    [address]  [address]  [place], [address]  [phone]  -----------------------------------------------

## 2024-12-23 NOTE — PROGRESS NOTE ADULT - ASSESSMENT
79 year old, obese, female with PMH of DAYANA (on CPAP), aortic stenosis, HTN, HLD, current smoker, COPD, Type 2 Diabetes Mellitus, and non-alcoholic fatty liver disease; who presented to [facility] on 11/20 for a scheduled TAVR, and possible stent placement.  She reports several month history of DUFF while walking and climbing up stairs, fatigue and BL LE edema. On 11/20 she underwent TAVR, drainage of retroperitoneal hematoma and pericardial window with Dr. Garland and Noah. Intraoperatively, she had an episode of Vtach (on Amiodarone).      Her hospital course was complicated by the following: acute hypoxic respiratory failure (requiring BiPAP and eventual intubation on 11/25), hemorrhagic shock, AFIB w/ RVR, coffee ground emesis, Code Blue (s/p CPR resulting in rib fractures), AIDA, fever secondary to aspiration PNA, +pseudomonas in sputum, persistent leukocytosis and low grade fevers (on Vanco per ID), L lateral abdominal wall hematoma, distended gallbladder and gallbladder sludge, dysphagia (s/p trache and PEG placement on 12/3), decannulation (12/13), L groin delayed wound healing (required wound vac), and hyperglycemia.  Patient now admitted for a multidisciplinary rehab program. 12-19-24 @ 13:25        #Aortic stenosis S/p TAVR  - Comprehensive Rehab Program of PT/OT/SLP  - ASA 81mg daily  - Plavix 75mg daily     #HTN  #AFIB w/ RVR  - Amiodarone 200mg daily   - Bumex 1mg daily   - Metoprolol 12.5mg BID   - Would start AC if okay by cardiology    #Type 2 Diabetes Mellitus  - A1c: 6.4% (Dec 2024)    - FS AC & HS  - Mod ISS  - Lantus 12U HS  - Monitor BGM    #DAYANA  #COPD  - Duo-neb q 6hrs    #Acute Hypoxic Respiratory Failures  - multifactorial, resolved  - decannulated 12/3  - PRN: Nebulizer QID   - Mucinex  - Chest PT  - on 1L NC; wean as tolerated as patient does not wear o2 at home.    #Mood  - Escitalopram 20mg daily     #Dysphagia  - tolerating oral feeds,   - PEG to continue in situ and f/u GI outpatient  (placed 12/3)    #GI/Bowel Mgmt   - Pantoprazole  - Senna , Miralax     # GI ppx: Pantoprazole  # DVT: ASA 81mg daily & Plavix 75mg daily, TEDs

## 2024-12-23 NOTE — ADVANCED PRACTICE NURSE CONSULT - ASSESSMENT
Patient admitted to Acute Rehab Unit after hospitalization for TAVR with complications.  Seen & examined at bedside with primary RN, sleeping but awakes to name, A&Ox4.  Patient with rotund abdomen, groin wound is in abdominal pannus fold.  Presents with Left Groin wound; 10 x 3 x 1 cm, incisional but irregularly shaped, wound bed red granular tissue.  Periwound skin is intact, without erythema, edema, warmth, fluctuance or induration.  Scant bleeding is noted upon dressing removal, decreased with light pressure applied.

## 2024-12-23 NOTE — PROGRESS NOTE ADULT - SUBJECTIVE AND OBJECTIVE BOX
CC: S/p TAVR     Today's Subjective & Objective Findings:  Patient seen and examined at bedside this AM.  Admits to sleeping well.  Noted with productive cough.   Passed MBS today, diet upgraded.   Eager for discharge home. Explained rehab meeting tomorrow.  Discussed need for CXR.     Denies headache, dizziness, visual changes, chest pain, SOB/DUFF, abdominal pain, nausea, vomiting, diarrhea, dysuria, numbness or tingling.    Therapy-- engaging, motivated    VITALS  T(C): 36.4 (12-23-24 @ 07:13), Max: 36.8 (12-22-24 @ 19:42)  HR: 71 (12-23-24 @ 07:13) (71 - 84)  BP: 138/77 (12-23-24 @ 07:13) (126/75 - 138/77)  RR: 16 (12-23-24 @ 07:13) (16 - 16)  SpO2: 95% (12-23-24 @ 07:13) (94% - 95%)      PHYSICAL EXAM  Constitutional - No acute distress, Comfortable  Neck - Supple  Cardiovascular - Palpable peripheral pulses   Respiratory/Chest- Symmetrical chest expansion, No dyspnea  Abdomen - Soft, Non-tender  Extremities - No cyanosis, No edema,   Neurologic Exam - Alert, Oriented, Interactive  Sensory - Light touch sensation intact  Motor Function - Moves all extremities spontaneously  Skin -          LABS:                        9.1    8.32  )-----------( 190      ( 23 Dec 2024 06:20 )             28.7     12-23    137  |  97  |  16  ----------------------------<  111[H]  3.4[L]   |  34[H]  |  0.67    Ca    8.8      23 Dec 2024 06:20    TPro  7.1  /  Alb  2.3[L]  /  TBili  0.5  /  DBili  x   /  AST  21  /  ALT  30  /  AlkPhos  204[H]  12-23      Urinalysis Basic - ( 23 Dec 2024 06:20 )    Color: x / Appearance: x / SG: x / pH: x  Gluc: 111 mg/dL / Ketone: x  / Bili: x / Urobili: x   Blood: x / Protein: x / Nitrite: x   Leuk Esterase: x / RBC: x / WBC x   Sq Epi: x / Non Sq Epi: x / Bacteria: x      CAPILLARY BLOOD GLUCOSE  POCT Blood Glucose.: 126 mg/dL (23 Dec 2024 08:09)  POCT Blood Glucose.: 175 mg/dL (22 Dec 2024 21:24)  POCT Blood Glucose.: 102 mg/dL (22 Dec 2024 16:38)  POCT Blood Glucose.: 184 mg/dL (22 Dec 2024 11:55)          MEDICATIONS  (STANDING):  albuterol/ipratropium for Nebulization 3 milliLiter(s) Nebulizer every 6 hours  aMIOdarone    Tablet 200 milliGRAM(s) Oral daily  AQUAPHOR (petrolatum Ointment) 1 Application(s) Topical two times a day  aspirin enteric coated 81 milliGRAM(s) Oral daily  buMETAnide 1 milliGRAM(s) Oral daily  clopidogrel Tablet 75 milliGRAM(s) Oral daily  dextrose 5%. 1000 milliLiter(s) (50 mL/Hr) IV Continuous <Continuous>  dextrose 5%. 1000 milliLiter(s) (100 mL/Hr) IV Continuous <Continuous>  dextrose 50% Injectable 25 Gram(s) IV Push once  dextrose 50% Injectable 12.5 Gram(s) IV Push once  dextrose 50% Injectable 25 Gram(s) IV Push once  escitalopram 20 milliGRAM(s) Oral at bedtime  glucagon  Injectable 1 milliGRAM(s) IntraMuscular once  guaiFENesin  milliGRAM(s) Oral every 12 hours  insulin glargine Injectable (LANTUS) 12 Unit(s) SubCutaneous at bedtime  insulin lispro (ADMELOG) corrective regimen sliding scale   SubCutaneous three times a day before meals  insulin lispro (ADMELOG) corrective regimen sliding scale   SubCutaneous at bedtime  metoprolol tartrate 12.5 milliGRAM(s) Oral two times a day  pantoprazole    Tablet 40 milliGRAM(s) Oral before breakfast  polyethylene glycol 3350 17 Gram(s) Oral daily  potassium chloride    Tablet ER 40 milliEquivalent(s) Oral once  senna 2 Tablet(s) Oral at bedtime    MEDICATIONS  (PRN):  acetaminophen     Tablet .. 650 milliGRAM(s) Oral every 6 hours PRN Mild Pain (1 - 3)  dextrose Oral Gel 15 Gram(s) Oral once PRN Blood Glucose LESS THAN 70 milliGRAM(s)/deciliter  lidocaine 5% Ointment 1 Application(s) Topical three times a day PRN rib pain   CC: S/p TAVR     Today's Subjective & Objective Findings:  Patient seen and examined at bedside this AM.  Admits to sleeping well.  Noted with productive cough.   Passed MBS today, diet upgraded.   Eager for discharge home. Explained rehab meeting tomorrow.  Discussed need for CXR.     Denies headache, dizziness, visual changes, chest pain, SOB/DUFF, abdominal pain, nausea, vomiting, diarrhea, dysuria, numbness or tingling.    Therapy-- engaging, motivated    VITALS  T(C): 36.4 (12-23-24 @ 07:13), Max: 36.8 (12-22-24 @ 19:42)  HR: 71 (12-23-24 @ 07:13) (71 - 84)  BP: 138/77 (12-23-24 @ 07:13) (126/75 - 138/77)  RR: 16 (12-23-24 @ 07:13) (16 - 16)  SpO2: 95% (12-23-24 @ 07:13) (94% - 95%)      PHYSICAL EXAM  Constitutional - No acute distress, Comfortable  Neck - Supple  Cardiovascular - Palpable peripheral pulses   Respiratory/Chest- Symmetrical chest expansion, No dyspnea  Abdomen - Soft, Non-tender  Extremities - No cyanosis, No edema,   Neurologic Exam - Alert, Oriented, Interactive  Sensory - Light touch sensation intact  Motor Function - Moves all extremities spontaneously  Skin -  Trach site 1x0.5cm with scan tan drainage with gauze and tape, Mid sternum incision well approximated, healed 5.5cm, MUNIRA, abd bruising, perineal bruising, L groin incision [id number] incision, wet to dry packing with gauze, CDI, L flank bruising, generalized ecchymosis and scabbing, b/l dry feet        LABS:                        9.1    8.32  )-----------( 190      ( 23 Dec 2024 06:20 )             28.7     12-23    137  |  97  |  16  ----------------------------<  111[H]  3.4[L]   |  34[H]  |  0.67    Ca    8.8      23 Dec 2024 06:20    TPro  7.1  /  Alb  2.3[L]  /  TBili  0.5  /  DBili  x   /  AST  21  /  ALT  30  /  AlkPhos  204[H]  12-23      Urinalysis Basic - ( 23 Dec 2024 06:20 )    Color: x / Appearance: x / SG: x / pH: x  Gluc: 111 mg/dL / Ketone: x  / Bili: x / Urobili: x   Blood: x / Protein: x / Nitrite: x   Leuk Esterase: x / RBC: x / WBC x   Sq Epi: x / Non Sq Epi: x / Bacteria: x      CAPILLARY BLOOD GLUCOSE  POCT Blood Glucose.: 126 mg/dL (23 Dec 2024 08:09)  POCT Blood Glucose.: 175 mg/dL (22 Dec 2024 21:24)  POCT Blood Glucose.: 102 mg/dL (22 Dec 2024 16:38)  POCT Blood Glucose.: 184 mg/dL (22 Dec 2024 11:55)          MEDICATIONS  (STANDING):  albuterol/ipratropium for Nebulization 3 milliLiter(s) Nebulizer every 6 hours  aMIOdarone    Tablet 200 milliGRAM(s) Oral daily  AQUAPHOR (petrolatum Ointment) 1 Application(s) Topical two times a day  aspirin enteric coated 81 milliGRAM(s) Oral daily  buMETAnide 1 milliGRAM(s) Oral daily  clopidogrel Tablet 75 milliGRAM(s) Oral daily  dextrose 5%. 1000 milliLiter(s) (50 mL/Hr) IV Continuous <Continuous>  dextrose 5%. 1000 milliLiter(s) (100 mL/Hr) IV Continuous <Continuous>  dextrose 50% Injectable 25 Gram(s) IV Push once  dextrose 50% Injectable 12.5 Gram(s) IV Push once  dextrose 50% Injectable 25 Gram(s) IV Push once  escitalopram 20 milliGRAM(s) Oral at bedtime  glucagon  Injectable 1 milliGRAM(s) IntraMuscular once  guaiFENesin  milliGRAM(s) Oral every 12 hours  insulin glargine Injectable (LANTUS) 12 Unit(s) SubCutaneous at bedtime  insulin lispro (ADMELOG) corrective regimen sliding scale   SubCutaneous three times a day before meals  insulin lispro (ADMELOG) corrective regimen sliding scale   SubCutaneous at bedtime  metoprolol tartrate 12.5 milliGRAM(s) Oral two times a day  pantoprazole    Tablet 40 milliGRAM(s) Oral before breakfast  polyethylene glycol 3350 17 Gram(s) Oral daily  potassium chloride    Tablet ER 40 milliEquivalent(s) Oral once  senna 2 Tablet(s) Oral at bedtime    MEDICATIONS  (PRN):  acetaminophen     Tablet .. 650 milliGRAM(s) Oral every 6 hours PRN Mild Pain (1 - 3)  dextrose Oral Gel 15 Gram(s) Oral once PRN Blood Glucose LESS THAN 70 milliGRAM(s)/deciliter  lidocaine 5% Ointment 1 Application(s) Topical three times a day PRN rib pain

## 2024-12-23 NOTE — ADVANCED PRACTICE NURSE CONSULT - RECOMMEDATIONS
Suggest moisten dressing before removal, then daily Normal Saline Cleanse of Left groin wound, pat thoroughly dry.  Apply Cavillon film barrier daily to intact periwound skin, allow to dry.  Gently pack area daily with saline moistened non-woven gauze (half of one piece), cover with folded dry non-woven gauze, cover with Tegaderm.  Nutritional support & hydration per Dietician's recommendations.

## 2024-12-24 LAB
GLUCOSE BLDC GLUCOMTR-MCNC: 115 MG/DL — HIGH (ref 70–99)
GLUCOSE BLDC GLUCOMTR-MCNC: 119 MG/DL — HIGH (ref 70–99)
GLUCOSE BLDC GLUCOMTR-MCNC: 159 MG/DL — HIGH (ref 70–99)
GLUCOSE BLDC GLUCOMTR-MCNC: 168 MG/DL — HIGH (ref 70–99)

## 2024-12-24 PROCEDURE — 99233 SBSQ HOSP IP/OBS HIGH 50: CPT

## 2024-12-24 PROCEDURE — 99232 SBSQ HOSP IP/OBS MODERATE 35: CPT

## 2024-12-24 RX ADMIN — PANTOPRAZOLE 40 MILLIGRAM(S): 40 TABLET, DELAYED RELEASE ORAL at 05:04

## 2024-12-24 RX ADMIN — IPRATROPIUM BROMIDE AND ALBUTEROL SULFATE 3 MILLILITER(S): .5; 2.5 SOLUTION RESPIRATORY (INHALATION) at 21:45

## 2024-12-24 RX ADMIN — IPRATROPIUM BROMIDE AND ALBUTEROL SULFATE 3 MILLILITER(S): .5; 2.5 SOLUTION RESPIRATORY (INHALATION) at 16:13

## 2024-12-24 RX ADMIN — ESCITALOPRAM OXALATE 20 MILLIGRAM(S): 10 TABLET ORAL at 20:49

## 2024-12-24 RX ADMIN — BUMETANIDE 1 MILLIGRAM(S): 2 TABLET ORAL at 05:04

## 2024-12-24 RX ADMIN — Medication 600 MILLIGRAM(S): at 05:04

## 2024-12-24 RX ADMIN — ENOXAPARIN SODIUM 40 MILLIGRAM(S): 60 INJECTION INTRAVENOUS; SUBCUTANEOUS at 17:00

## 2024-12-24 RX ADMIN — Medication 12.5 MILLIGRAM(S): at 17:00

## 2024-12-24 RX ADMIN — Medication 12.5 MILLIGRAM(S): at 05:04

## 2024-12-24 RX ADMIN — Medication 2: at 17:00

## 2024-12-24 RX ADMIN — Medication 600 MILLIGRAM(S): at 17:00

## 2024-12-24 RX ADMIN — AMIODARONE HYDROCHLORIDE 200 MILLIGRAM(S): 200 TABLET ORAL at 05:04

## 2024-12-24 RX ADMIN — Medication 1 APPLICATION(S): at 17:01

## 2024-12-24 RX ADMIN — INSULIN GLARGINE-YFGN 12 UNIT(S): 100 INJECTION, SOLUTION SUBCUTANEOUS at 20:50

## 2024-12-24 RX ADMIN — IPRATROPIUM BROMIDE AND ALBUTEROL SULFATE 3 MILLILITER(S): .5; 2.5 SOLUTION RESPIRATORY (INHALATION) at 08:40

## 2024-12-24 RX ADMIN — CLOPIDOGREL BISULFATE 75 MILLIGRAM(S): 75 TABLET, FILM COATED ORAL at 11:49

## 2024-12-24 RX ADMIN — Medication 1 APPLICATION(S): at 05:04

## 2024-12-24 RX ADMIN — Medication 2: at 11:49

## 2024-12-24 NOTE — PROGRESS NOTE ADULT - ASSESSMENT
79 year old, obese, female with PMH of DAYANA (on CPAP), aortic stenosis, HTN, HLD, current smoker, COPD, Type 2 Diabetes Mellitus, and non-alcoholic fatty liver disease; who presented to [facility] on 11/20 for a scheduled TAVR, and possible stent placement.  She reports several month history of DUFF while walking and climbing up stairs, fatigue and BL LE edema. On 11/20 she underwent TAVR, drainage of retroperitoneal hematoma and pericardial window with Dr. Garland and Noah. Intraoperatively, she had an episode of Vtach (on Amiodarone).      Her hospital course was complicated by the following: acute hypoxic respiratory failure (requiring BiPAP and eventual intubation on 11/25), hemorrhagic shock, AFIB w/ RVR, coffee ground emesis, Code Blue (s/p CPR resulting in rib fractures), AIDA, fever secondary to aspiration PNA, +pseudomonas in sputum, persistent leukocytosis and low grade fevers (on Vanco per ID), L lateral abdominal wall hematoma, distended gallbladder and gallbladder sludge, dysphagia (s/p trache and PEG placement on 12/3), decannulation (12/13), L groin delayed wound healing (required wound vac), and hyperglycemia.  Patient now admitted for a multidisciplinary rehab program. 12-19-24 @ 13:25        #Aortic stenosis S/p TAVR  - Comprehensive Rehab Program of PT/OT/SLP  - Plavix 75mg daily     #HTN  #AFIB w/ RVR  - Amiodarone 200mg daily   - Bumex 1mg daily   - Metoprolol 12.5mg BID   - No therapeutic AC as pt had mass tranfusion protocol at [place]  - started on dvt ppx    #Type 2 Diabetes Mellitus  - A1c: 6.4% (Dec 2024)    - FS AC & HS  - Mod ISS  - Lantus 12U HS  - Monitor BGM    #DAYANA  #COPD  - Duo-neb q 6hrs    #Acute Hypoxic Respiratory Failures  - multifactorial, resolved  - decannulated 12/3  - PRN: Nebulizer QID   - Mucinex  - Chest PT  - on 1L NC; wean as tolerated as patient does not wear o2 at home.    #Mood  - Escitalopram 20mg daily     #Dysphagia  - tolerating oral feeds,   - PEG to continue in situ and f/u GI outpatient  (placed 12/3)    #GI/Bowel Mgmt   - Pantoprazole  - Senna , Miralax     # GI ppx: Pantoprazole  # DVT: lovenoxGeovany

## 2024-12-24 NOTE — PROGRESS NOTE ADULT - NS ATTEND AMEND GEN_ALL_CORE FT
Seen and examined, findings as noted    Patient has persisting cough awaiting CXR  tolerating nebulizer treatment     Higher education deficits noted  MBS result noted    IDT function as stated, limited ambulatory distance    Continue therapy   DVT ppx  Nebulizer treatment   Continue all supportive treatments

## 2024-12-24 NOTE — PROGRESS NOTE ADULT - ASSESSMENT
79 year old, obese, female with PMH of DAYANA (on CPAP), aortic stenosis, HTN, HLD, current smoker, COPD, Type 2 Diabetes Mellitus, and non-alcoholic fatty liver disease; who presented to [facility] on 11/20 for a scheduled TAVR, and possible stent placement.  She reports several month history of DUFF while walking and climbing up stairs, fatigue and BL LE edema. On 11/20 she underwent TAVR, drainage of retroperitoneal hematoma and pericardial window with Dr. Garland and Noah. Intraoperatively, she had an episode of Vtach (on Amiodarone).    Her hospital course was complicated by the following: acute hypoxic respiratory failure (requiring BiPAP and eventual intubation on 11/25), hemorrhagic shock, AFIB w/ RVR, coffee ground emesis, Code Blue (s/p CPR resulting in rib fractures), AIDA, fever secondary to aspiration PNA, +pseudomonas in sputum, persistent leukocytosis and low grade fevers (on Vanco per ID), L lateral abdominal wall hematoma, distended gallbladder and gallbladder sludge, dysphagia (s/p trache and PEG placement on 12/3), decannulation (12/13), L groin delayed wound healing (requiring wound vac), and hyperglycemia.  Patient now admitted for a multidisciplinary rehab program. 12-19-24 @ 13:25    * Left groin wound - wound recs appreciated   * Dysphagia - tolerating oral feeds, PEG to continue in situ and f/u GI outpatient  (placed 12/3)   * Change dry dressing cover on trach site, every 2 days  * Cough - await CXR - continue nebs    #Aortic valve disease S/p TAVR  - Gait Instability, ADL impairments and Functional impairments:   Commence Comprehensive Rehab Program of PT/OT/SLP  - 3 hours a day, 5 days a week  - ASA 81mg daily  - Plavix 75mg daily     REHAB ISSUES  Decreased ambulatory distance  Left groin wound  Impaired balance  Resp failure     #Acute Hypoxic Respiratory Failures  #Cough  - PRN: Nebulizer QID   - on 1L NC; wean as tolerated as patient does not wear o2 at home.  - Await CXR  - Mucinex BID  - Chest PT     #HTN  #AFIB w/ RVR  - Amiodarone 200mg daily   - Bumex 1mg daily   - Metoprolol 12.5mg BID     #Type 2 Diabetes Mellitus  - A1c: 6.4% (Dec 2024)    - FS AC & HS  - Mod ISS  - Lantus 12U HS    #Mood  - Escitalopram 20mg daily     #Skin  - Skin -- Trach site 1x0.5cm with scan tan drainage with gauze and tape, Mid sternum incision well approximated, healed 5.5cm, MUNIRA, abd bruising, perineal bruising, L groin incision [id number] incision, wet to dry packing with gauze, CDI, L flank bruising, generalized ecchymosis and scabbing, b/l dry feet   - Aquaphor BID   - Pressure injury/Skin: OOB to Chair, PT/OT   - Left groin wound - wound care recs appreciated   -- Suggest moisten dressing before removal, then daily Normal Saline Cleanse of Left groin wound, pat thoroughly dry.  Apply Cavillon film barrier daily to intact periwound skin, allow to dry.  Gently pack area daily with saline moistened non-woven gauze (half of one piece), cover with folded dry non-woven gauze, cover with Tegaderm.      #Pain Mgmt   - PRN: Tylenol     #GI/Bowel Mgmt   - Pantoprazole  - Senna , Miralax    #/Bladder Mgmt --voiding     #FEN   - Diet - Regular with Thin Liquids   - Dysphagia  SLP - evaluation and treatment  - S/p MBS on 12/23 - passed, diet upgraded (as above)  - Dysphagia--tolerating oral feeds, PEG to continue in situ and f/u GI outpatient  (placed 12/3)     #Precautions / PROPHYLAXIS:   - Falls, Cardiac  - ortho: Weight bearing status: WBAT   - Lungs: Aspiration, Incentive Spirometer   - DVT: ASA 81mg daily & Plavix 75mg daily, TEDs   ----------------------------------------------  Next of Kin:   Daughter: Janie Drake: [phone]  ----------------------------------------------  Dr. Bailey Liaison with Family/Providers:    -----------------------------------------------  OUTPATIENT/FOLLOW UP:    Todd Devine MD  Vascular surgery, General surgery  [address]  [address]  [place] [address]  [phone]    Todd Odom MD  Cardiology, Interventional  [facility]   [address]  [address]  [facility], [address]  [phone]  ----------------------------------------------- 79 year old, obese, female with PMH of DAYANA (on CPAP), aortic stenosis, HTN, HLD, current smoker, COPD, Type 2 Diabetes Mellitus, and non-alcoholic fatty liver disease; who presented to [facility] on 11/20 for a scheduled TAVR, and possible stent placement.  She reports several month history of DUFF while walking and climbing up stairs, fatigue and BL LE edema. On 11/20 she underwent TAVR, drainage of retroperitoneal hematoma and pericardial window with Dr. Garland and Noah. Intraoperatively, she had an episode of Vtach (on Amiodarone).    Her hospital course was complicated by the following: acute hypoxic respiratory failure (requiring BiPAP and eventual intubation on 11/25), hemorrhagic shock, AFIB w/ RVR, coffee ground emesis, Code Blue (s/p CPR resulting in rib fractures), AIDA, fever secondary to aspiration PNA, +pseudomonas in sputum, persistent leukocytosis and low grade fevers (on Vanco per ID), L lateral abdominal wall hematoma, distended gallbladder and gallbladder sludge, dysphagia (s/p trache and PEG placement on 12/3), decannulation (12/13), L groin delayed wound healing (requiring wound vac), and hyperglycemia.  Patient now admitted for a multidisciplinary rehab program. 12-19-24 @ 13:25    * Left groin wound - wound recs appreciated   * Cognitive deficits--higher execut fxn deficits   * Apply Tape over trach site  * 12/23--MBS--Dysphagia as noted tolerating oral diet   * Cough - await CXR - continue nebs    #Aortic valve disease S/p TAVR  - Gait Instability, ADL impairments and Functional impairments:   Commence Comprehensive Rehab Program of PT/OT/SLP  - 3 hours a day, 5 days a week  - ASA 81mg daily  - Plavix 75mg daily     REHAB ISSUES  Decreased ambulatory distance  Left groin wound  Impaired balance  Resp failure     #Acute Hypoxic Respiratory Failures  #Cough  - PRN: Nebulizer QID   - on 1L NC; wean as tolerated as patient does not wear o2 at home.  - Await CXR  - Mucinex BID  - Chest PT     #HTN  #AFIB w/ RVR  - Amiodarone 200mg daily   - Bumex 1mg daily   - Metoprolol 12.5mg BID     #Type 2 Diabetes Mellitus  - A1c: 6.4% (Dec 2024)    - FS AC & HS  - Mod ISS  - Lantus 12U HS    #Mood  - Escitalopram 20mg daily     #Skin  - Skin -- Trach site 1x0.5cm with scan tan drainage with gauze and tape, Mid sternum incision well approximated, healed 5.5cm, MUNIRA, abd bruising, perineal bruising, L groin incision [id number] incision, wet to dry packing with gauze, CDI, L flank bruising, generalized ecchymosis and scabbing, b/l dry feet   - Aquaphor BID   - Pressure injury/Skin: OOB to Chair, PT/OT   - Left groin wound - wound care recs appreciated   -- Suggest moisten dressing before removal, then daily Normal Saline Cleanse of Left groin wound, pat thoroughly dry.  Apply Cavillon film barrier daily to intact periwound skin, allow to dry.  Gently pack area daily with saline moistened non-woven gauze (half of one piece), cover with folded dry non-woven gauze, cover with Tegaderm.      #Pain Mgmt   - PRN: Tylenol     #GI/Bowel Mgmt   - Pantoprazole  - Senna , Miralax    #/Bladder Mgmt --voiding     #FEN   - Diet - Regular with Thin Liquids   - Dysphagia  SLP - evaluation and treatment  - S/p MBS on 12/23 - passed, diet upgraded (as above)  - Dysphagia--tolerating oral feeds, PEG to continue in situ and f/u GI outpatient  (placed 12/3)     #Precautions / PROPHYLAXIS:   - Falls, Cardiac  - ortho: Weight bearing status: WBAT   - Lungs: Aspiration, Incentive Spirometer   - DVT: ASA 81mg daily & Plavix 75mg daily, TEDs   ----------------------------------------------  Next of Kin:   Daughter: Janie Drake: [phone]  ----------------------------------------------    IDT 12/24  Therapy-- Ambulating 50ft RW with min assist  SLP--Mod cog deficits, impulsivity  Barriers--left groin wound, cognitive deficits  Est 1/4  Dr. O's Liaison with Family/Providers:    -----------------------------------------------  OUTPATIENT/FOLLOW UP:    Todd Devine MD  Vascular surgery, General surgery  [address]  [address]  [place] [address]  [phone]    Todd Odom MD  Cardiology, Interventional  [facility]    [address]  [address]  [facility], [address]  [phone]  -----------------------------------------------

## 2024-12-24 NOTE — PROGRESS NOTE ADULT - SUBJECTIVE AND OBJECTIVE BOX
CC: S/p TAVR     Today's Subjective & Objective Findings:  Patient seen and examined at bedside this AM.  Admits to sleeping well.  Productive cough persists.   Awaiting CXR results.     Denies headache, dizziness, visual changes, chest pain, SOB/DUFF, abdominal pain, nausea, vomiting, diarrhea, dysuria, numbness or tingling. LBM 12/23     Therapy-- engaging, motivated    Vital Signs Last 24 Hrs  T(C): 37.4 (24 Dec 2024 08:11), Max: 37.4 (24 Dec 2024 08:11)  T(F): 99.4 (24 Dec 2024 08:11), Max: 99.4 (24 Dec 2024 08:11)  HR: 76 (24 Dec 2024 08:11) (73 - 80)  BP: 139/74 (24 Dec 2024 08:11) (123/76 - 161/82)  BP(mean): --  RR: 16 (24 Dec 2024 08:11) (16 - 16)  SpO2: 94% (24 Dec 2024 08:11) (94% - 95%)      PHYSICAL EXAM  Constitutional - No acute distress, Comfortable  Neck - Supple  Cardiovascular - Palpable peripheral pulses   Respiratory/Chest- Symmetrical chest expansion, No dyspnea  Abdomen - Soft, Non-tender  Extremities - No cyanosis, No edema,   Neurologic Exam - Alert, Oriented, Interactive  Sensory - Light touch sensation intact  Motor Function - Moves all extremities spontaneously  Skin -  Trach site 1x0.5cm with scan tan drainage with gauze and tape, Mid sternum incision well approximated, healed 5.5cm, MUNIRA, abd bruising, perineal bruising, L groin incision [id number] incision, wet to dry packing with gauze, CDI, L flank bruising, generalized ecchymosis and scabbing, b/l dry feet        LABS:                        9.1    8.32  )-----------( 190      ( 23 Dec 2024 06:20 )             28.7     12-23    137  |  97  |  16  ----------------------------<  111[H]  3.4[L]   |  34[H]  |  0.67    Ca    8.8      23 Dec 2024 06:20    TPro  7.1  /  Alb  2.3[L]  /  TBili  0.5  /  DBili  x   /  AST  21  /  ALT  30  /  AlkPhos  204[H]  12-23      Urinalysis Basic - ( 23 Dec 2024 06:20 )    Color: x / Appearance: x / SG: x / pH: x  Gluc: 111 mg/dL / Ketone: x  / Bili: x / Urobili: x   Blood: x / Protein: x / Nitrite: x   Leuk Esterase: x / RBC: x / WBC x   Sq Epi: x / Non Sq Epi: x / Bacteria: x      CAPILLARY BLOOD GLUCOSE  POCT Blood Glucose.: 119 mg/dL (24 Dec 2024 08:00)  POCT Blood Glucose.: 155 mg/dL (23 Dec 2024 21:38)  POCT Blood Glucose.: 157 mg/dL (23 Dec 2024 16:42)  POCT Blood Glucose.: 160 mg/dL (23 Dec 2024 11:37)        MEDICATIONS  (STANDING):  albuterol/ipratropium for Nebulization 3 milliLiter(s) Nebulizer every 6 hours  aMIOdarone    Tablet 200 milliGRAM(s) Oral daily  AQUAPHOR (petrolatum Ointment) 1 Application(s) Topical two times a day  buMETAnide 1 milliGRAM(s) Oral daily  clopidogrel Tablet 75 milliGRAM(s) Oral daily  dextrose 5%. 1000 milliLiter(s) (50 mL/Hr) IV Continuous <Continuous>  dextrose 5%. 1000 milliLiter(s) (100 mL/Hr) IV Continuous <Continuous>  dextrose 50% Injectable 25 Gram(s) IV Push once  dextrose 50% Injectable 12.5 Gram(s) IV Push once  dextrose 50% Injectable 25 Gram(s) IV Push once  enoxaparin Injectable 40 milliGRAM(s) SubCutaneous every 24 hours  escitalopram 20 milliGRAM(s) Oral at bedtime  glucagon  Injectable 1 milliGRAM(s) IntraMuscular once  guaiFENesin  milliGRAM(s) Oral every 12 hours  insulin glargine Injectable (LANTUS) 12 Unit(s) SubCutaneous at bedtime  insulin lispro (ADMELOG) corrective regimen sliding scale   SubCutaneous three times a day before meals  insulin lispro (ADMELOG) corrective regimen sliding scale   SubCutaneous at bedtime  metoprolol tartrate 12.5 milliGRAM(s) Oral two times a day  pantoprazole    Tablet 40 milliGRAM(s) Oral before breakfast  polyethylene glycol 3350 17 Gram(s) Oral daily  senna 2 Tablet(s) Oral at bedtime    MEDICATIONS  (PRN):  acetaminophen     Tablet .. 650 milliGRAM(s) Oral every 6 hours PRN Mild Pain (1 - 3)  dextrose Oral Gel 15 Gram(s) Oral once PRN Blood Glucose LESS THAN 70 milliGRAM(s)/deciliter  lidocaine 5% Ointment 1 Application(s) Topical three times a day PRN rib pain   CC: S/p TAVR     Today's Subjective & Objective Findings:  Patient seen and examined at bedside this AM.  Reports good night rest   Productive cough persists.   Awaiting CXR results.     Denies headache, dizziness, visual changes, chest pain, SOB/DUFF, abdominal pain, nausea, vomiting, diarrhea, dysuria, numbness or tingling. LBM 12/23     Therapy-- Ambulating 50ft RW with min assist  SLP--Mod cog deficits, impulsivity  Barriers--left groin wound, cognitive deficits  Est 1/4    Vital Signs Last 24 Hrs  T(C): 37.4 (24 Dec 2024 08:11), Max: 37.4 (24 Dec 2024 08:11)  T(F): 99.4 (24 Dec 2024 08:11), Max: 99.4 (24 Dec 2024 08:11)  HR: 76 (24 Dec 2024 08:11) (73 - 80)  BP: 139/74 (24 Dec 2024 08:11) (123/76 - 161/82)  RR: 16 (24 Dec 2024 08:11) (16 - 16)  SpO2: 94% (24 Dec 2024 08:11) (94% - 95%)      PHYSICAL EXAM  Constitutional - No acute distress, Comfortable  Neck - Supple  Cardiovascular - Palpable peripheral pulses   Respiratory/Chest- decreased air entry b/l lower lobes and few rhonchi  Abdomen - Soft, Non-tender  Extremities - No cyanosis, No edema,     Neurologic Exam - Alert, Oriented, Interactive  Sensory - Light touch sensation intact  Motor Function - Moves all extremities spontaneously  Skin -  Trach site 1x0.5cm with scan tan drainage with gauze and tape, Mid sternum incision well approximated, healed 5.5cm, MUNIRA, abd bruising, perineal bruising, L groin incision [id number] incision, wet to dry packing with gauze, CDI, L flank bruising, generalized ecchymosis and scabbing, b/l dry feet        LABS:                        9.1    8.32  )-----------( 190      ( 23 Dec 2024 06:20 )             28.7     12-23    137  |  97  |  16  ----------------------------<  111[H]  3.4[L]   |  34[H]  |  0.67    Ca    8.8      23 Dec 2024 06:20    TPro  7.1  /  Alb  2.3[L]  /  TBili  0.5  /  DBili  x   /  AST  21  /  ALT  30  /  AlkPhos  204[H]  12-23      Urinalysis Basic - ( 23 Dec 2024 06:20 )    Color: x / Appearance: x / SG: x / pH: x  Gluc: 111 mg/dL / Ketone: x  / Bili: x / Urobili: x   Blood: x / Protein: x / Nitrite: x   Leuk Esterase: x / RBC: x / WBC x   Sq Epi: x / Non Sq Epi: x / Bacteria: x    < from: Xray Cinesophagram Swallow Function w/ Contrast (12.23.24 @ 10:32) >  R SWAL FUNC JOSE RAFAEL VID CON STDY   ORDERED BY:  SOURAV SHEETS     PROCEDURE DATE:  12/23/2024      INTERPRETATION:  Swallowing function video. Patient has swallowing   difficulty. Barium refuses contrast.    There was somepooling in the valleculae and somewhat more pooling in the   piriforms. No penetration or aspiration was seen during the study.    IMPRESSION: As above.    --- End of Report ---    < end of copied text >    CAPILLARY BLOOD GLUCOSE  POCT Blood Glucose.: 119 mg/dL (24 Dec 2024 08:00)  POCT Blood Glucose.: 155 mg/dL (23 Dec 2024 21:38)  POCT Blood Glucose.: 157 mg/dL (23 Dec 2024 16:42)  POCT Blood Glucose.: 160 mg/dL (23 Dec 2024 11:37)        MEDICATIONS  (STANDING):  albuterol/ipratropium for Nebulization 3 milliLiter(s) Nebulizer every 6 hours  aMIOdarone    Tablet 200 milliGRAM(s) Oral daily  AQUAPHOR (petrolatum Ointment) 1 Application(s) Topical two times a day  buMETAnide 1 milliGRAM(s) Oral daily  clopidogrel Tablet 75 milliGRAM(s) Oral daily  dextrose 5%. 1000 milliLiter(s) (50 mL/Hr) IV Continuous <Continuous>  dextrose 5%. 1000 milliLiter(s) (100 mL/Hr) IV Continuous <Continuous>  dextrose 50% Injectable 25 Gram(s) IV Push once  dextrose 50% Injectable 12.5 Gram(s) IV Push once  dextrose 50% Injectable 25 Gram(s) IV Push once  enoxaparin Injectable 40 milliGRAM(s) SubCutaneous every 24 hours  escitalopram 20 milliGRAM(s) Oral at bedtime  glucagon  Injectable 1 milliGRAM(s) IntraMuscular once  guaiFENesin  milliGRAM(s) Oral every 12 hours  insulin glargine Injectable (LANTUS) 12 Unit(s) SubCutaneous at bedtime  insulin lispro (ADMELOG) corrective regimen sliding scale   SubCutaneous three times a day before meals  insulin lispro (ADMELOG) corrective regimen sliding scale   SubCutaneous at bedtime  metoprolol tartrate 12.5 milliGRAM(s) Oral two times a day  pantoprazole    Tablet 40 milliGRAM(s) Oral before breakfast  polyethylene glycol 3350 17 Gram(s) Oral daily  senna 2 Tablet(s) Oral at bedtime    MEDICATIONS  (PRN):  acetaminophen     Tablet .. 650 milliGRAM(s) Oral every 6 hours PRN Mild Pain (1 - 3)  dextrose Oral Gel 15 Gram(s) Oral once PRN Blood Glucose LESS THAN 70 milliGRAM(s)/deciliter  lidocaine 5% Ointment 1 Application(s) Topical three times a day PRN rib pain

## 2024-12-24 NOTE — PROGRESS NOTE ADULT - SUBJECTIVE AND OBJECTIVE BOX
PROGRESS NOTE:     Patient is a 79y old  Female who presents with a chief complaint of Aortic Valve Disease s/p TAVR (23 Dec 2024 11:32)          SUBJECTIVE & OBJECTIVE:   Pt seen and examined at bedside in AM    no overnight events.     REVIEW OF SYSTEMS: remaining ROS negative     PHYSICAL EXAM:  VITALS:  Vital Signs Last 24 Hrs  T(C): 37.4 (24 Dec 2024 08:11), Max: 37.4 (24 Dec 2024 08:11)  T(F): 99.4 (24 Dec 2024 08:11), Max: 99.4 (24 Dec 2024 08:11)  HR: 76 (24 Dec 2024 08:11) (73 - 80)  BP: 139/74 (24 Dec 2024 08:11) (123/76 - 161/82)  BP(mean): --  RR: 16 (24 Dec 2024 08:11) (16 - 16)  SpO2: 94% (24 Dec 2024 08:11) (94% - 95%)    Parameters below as of 24 Dec 2024 08:40  Patient On (Oxygen Delivery Method): room air        GENERAL: NAD,  no increased WOB  HEAD:  Atraumatic, Normocephalic  EYES: EOMI,  conjunctiva and sclera clear  ENMT: Moist mucous membranes  NECK: Supple, No JVD  NERVOUS SYSTEM:  Alert & Oriented   CHEST/LUNG: Clear to auscultation bilaterally; No rales, rhonchi, wheezing  HEART: Regular rate and rhythm  ABDOMEN: Soft, Nontender, Nondistended; Bowel sounds present  EXTREMITIES: No clubbing, cyanosis      MEDICATIONS  (STANDING):  albuterol/ipratropium for Nebulization 3 milliLiter(s) Nebulizer every 6 hours  aMIOdarone    Tablet 200 milliGRAM(s) Oral daily  AQUAPHOR (petrolatum Ointment) 1 Application(s) Topical two times a day  buMETAnide 1 milliGRAM(s) Oral daily  clopidogrel Tablet 75 milliGRAM(s) Oral daily  dextrose 5%. 1000 milliLiter(s) (50 mL/Hr) IV Continuous <Continuous>  dextrose 5%. 1000 milliLiter(s) (100 mL/Hr) IV Continuous <Continuous>  dextrose 50% Injectable 25 Gram(s) IV Push once  dextrose 50% Injectable 12.5 Gram(s) IV Push once  dextrose 50% Injectable 25 Gram(s) IV Push once  enoxaparin Injectable 40 milliGRAM(s) SubCutaneous every 24 hours  escitalopram 20 milliGRAM(s) Oral at bedtime  glucagon  Injectable 1 milliGRAM(s) IntraMuscular once  guaiFENesin  milliGRAM(s) Oral every 12 hours  insulin glargine Injectable (LANTUS) 12 Unit(s) SubCutaneous at bedtime  insulin lispro (ADMELOG) corrective regimen sliding scale   SubCutaneous three times a day before meals  insulin lispro (ADMELOG) corrective regimen sliding scale   SubCutaneous at bedtime  metoprolol tartrate 12.5 milliGRAM(s) Oral two times a day  pantoprazole    Tablet 40 milliGRAM(s) Oral before breakfast  polyethylene glycol 3350 17 Gram(s) Oral daily  senna 2 Tablet(s) Oral at bedtime    MEDICATIONS  (PRN):  acetaminophen     Tablet .. 650 milliGRAM(s) Oral every 6 hours PRN Mild Pain (1 - 3)  dextrose Oral Gel 15 Gram(s) Oral once PRN Blood Glucose LESS THAN 70 milliGRAM(s)/deciliter  lidocaine 5% Ointment 1 Application(s) Topical three times a day PRN rib pain        Allergies    penicillins (Hives)  Tetanus Immune Globulin (Unknown)  adhesives (Rash)  Typhoid Vaccines (Anaphylaxis)    Intolerances              LABS:                           9.1    8.32  )-----------( 190      ( 23 Dec 2024 06:20 )             28.7     12-23    137  |  97  |  16  ----------------------------<  111[H]  3.4[L]   |  34[H]  |  0.67    Ca    8.8      23 Dec 2024 06:20    TPro  7.1  /  Alb  2.3[L]  /  TBili  0.5  /  DBili  x   /  AST  21  /  ALT  30  /  AlkPhos  204[H]  12-23      Urinalysis Basic - ( 23 Dec 2024 06:20 )    Color: x / Appearance: x / SG: x / pH: x  Gluc: 111 mg/dL / Ketone: x  / Bili: x / Urobili: x   Blood: x / Protein: x / Nitrite: x   Leuk Esterase: x / RBC: x / WBC x   Sq Epi: x / Non Sq Epi: x / Bacteria: x      CAPILLARY BLOOD GLUCOSE      POCT Blood Glucose.: 119 mg/dL (24 Dec 2024 08:00)  POCT Blood Glucose.: 155 mg/dL (23 Dec 2024 21:38)  POCT Blood Glucose.: 157 mg/dL (23 Dec 2024 16:42)  POCT Blood Glucose.: 160 mg/dL (23 Dec 2024 11:37)                  RECENT CULTURES:          RADIOLOGY & ADDITIONAL TESTS:

## 2024-12-25 LAB
GLUCOSE BLDC GLUCOMTR-MCNC: 123 MG/DL — HIGH (ref 70–99)
GLUCOSE BLDC GLUCOMTR-MCNC: 126 MG/DL — HIGH (ref 70–99)
GLUCOSE BLDC GLUCOMTR-MCNC: 145 MG/DL — HIGH (ref 70–99)
GLUCOSE BLDC GLUCOMTR-MCNC: 148 MG/DL — HIGH (ref 70–99)

## 2024-12-25 PROCEDURE — 99232 SBSQ HOSP IP/OBS MODERATE 35: CPT

## 2024-12-25 RX ADMIN — Medication 12.5 MILLIGRAM(S): at 18:37

## 2024-12-25 RX ADMIN — Medication 1 APPLICATION(S): at 06:30

## 2024-12-25 RX ADMIN — Medication 600 MILLIGRAM(S): at 06:28

## 2024-12-25 RX ADMIN — ESCITALOPRAM OXALATE 20 MILLIGRAM(S): 10 TABLET ORAL at 22:12

## 2024-12-25 RX ADMIN — IPRATROPIUM BROMIDE AND ALBUTEROL SULFATE 3 MILLILITER(S): .5; 2.5 SOLUTION RESPIRATORY (INHALATION) at 15:23

## 2024-12-25 RX ADMIN — Medication 12.5 MILLIGRAM(S): at 06:27

## 2024-12-25 RX ADMIN — ENOXAPARIN SODIUM 40 MILLIGRAM(S): 60 INJECTION INTRAVENOUS; SUBCUTANEOUS at 18:37

## 2024-12-25 RX ADMIN — INSULIN GLARGINE-YFGN 12 UNIT(S): 100 INJECTION, SOLUTION SUBCUTANEOUS at 22:13

## 2024-12-25 RX ADMIN — AMIODARONE HYDROCHLORIDE 200 MILLIGRAM(S): 200 TABLET ORAL at 06:27

## 2024-12-25 RX ADMIN — IPRATROPIUM BROMIDE AND ALBUTEROL SULFATE 3 MILLILITER(S): .5; 2.5 SOLUTION RESPIRATORY (INHALATION) at 07:04

## 2024-12-25 RX ADMIN — Medication 1 APPLICATION(S): at 18:37

## 2024-12-25 RX ADMIN — BUMETANIDE 1 MILLIGRAM(S): 2 TABLET ORAL at 06:27

## 2024-12-25 RX ADMIN — CLOPIDOGREL BISULFATE 75 MILLIGRAM(S): 75 TABLET, FILM COATED ORAL at 12:47

## 2024-12-25 RX ADMIN — PANTOPRAZOLE 40 MILLIGRAM(S): 40 TABLET, DELAYED RELEASE ORAL at 06:29

## 2024-12-25 RX ADMIN — SENNOSIDES 2 TABLET(S): 8.6 TABLET, FILM COATED ORAL at 22:11

## 2024-12-25 RX ADMIN — Medication 600 MILLIGRAM(S): at 18:37

## 2024-12-25 NOTE — PROGRESS NOTE ADULT - SUBJECTIVE AND OBJECTIVE BOX
PROGRESS NOTE:     Patient is a 79y old  Female who presents with a chief complaint of Aortic Valve Disease s/p TAVR (23 Dec 2024 11:32)          SUBJECTIVE & OBJECTIVE:   Pt seen and examined at bedside in AM    no overnight events.     REVIEW OF SYSTEMS: remaining ROS negative     PHYSICAL EXAM:  VITALS:  Vital Signs Last 24 Hrs  T(C): 36.4 (25 Dec 2024 08:02), Max: 37.2 (24 Dec 2024 20:30)  T(F): 97.6 (25 Dec 2024 08:02), Max: 98.9 (24 Dec 2024 20:30)  HR: 64 (25 Dec 2024 08:02) (64 - 87)  BP: 139/84 (25 Dec 2024 08:02) (139/84 - 149/81)  BP(mean): --  RR: 16 (25 Dec 2024 08:02) (16 - 16)  SpO2: 95% (25 Dec 2024 08:02) (95% - 97%)    Parameters below as of 25 Dec 2024 08:02  Patient On (Oxygen Delivery Method): room air        GENERAL: NAD,  no increased WOB  HEAD:  Atraumatic, Normocephalic  EYES: EOMI,  conjunctiva and sclera clear  ENMT: Moist mucous membranes  NECK: Supple, No JVD  NERVOUS SYSTEM:  Alert & Oriented   CHEST/LUNG: Clear to auscultation bilaterally; No rales, rhonchi, wheezing  HEART: Regular rate and rhythm  ABDOMEN: Soft, Nontender, Nondistended; Bowel sounds present  EXTREMITIES: No clubbing, cyanosis      MEDICATIONS  (STANDING):  albuterol/ipratropium for Nebulization 3 milliLiter(s) Nebulizer every 6 hours  aMIOdarone    Tablet 200 milliGRAM(s) Oral daily  AQUAPHOR (petrolatum Ointment) 1 Application(s) Topical two times a day  buMETAnide 1 milliGRAM(s) Oral daily  clopidogrel Tablet 75 milliGRAM(s) Oral daily  dextrose 5%. 1000 milliLiter(s) (50 mL/Hr) IV Continuous <Continuous>  dextrose 5%. 1000 milliLiter(s) (100 mL/Hr) IV Continuous <Continuous>  dextrose 50% Injectable 25 Gram(s) IV Push once  dextrose 50% Injectable 12.5 Gram(s) IV Push once  dextrose 50% Injectable 25 Gram(s) IV Push once  enoxaparin Injectable 40 milliGRAM(s) SubCutaneous every 24 hours  escitalopram 20 milliGRAM(s) Oral at bedtime  glucagon  Injectable 1 milliGRAM(s) IntraMuscular once  guaiFENesin  milliGRAM(s) Oral every 12 hours  insulin glargine Injectable (LANTUS) 12 Unit(s) SubCutaneous at bedtime  insulin lispro (ADMELOG) corrective regimen sliding scale   SubCutaneous three times a day before meals  insulin lispro (ADMELOG) corrective regimen sliding scale   SubCutaneous at bedtime  metoprolol tartrate 12.5 milliGRAM(s) Oral two times a day  pantoprazole    Tablet 40 milliGRAM(s) Oral before breakfast  polyethylene glycol 3350 17 Gram(s) Oral daily  senna 2 Tablet(s) Oral at bedtime    MEDICATIONS  (PRN):  acetaminophen     Tablet .. 650 milliGRAM(s) Oral every 6 hours PRN Mild Pain (1 - 3)  dextrose Oral Gel 15 Gram(s) Oral once PRN Blood Glucose LESS THAN 70 milliGRAM(s)/deciliter  lidocaine 5% Ointment 1 Application(s) Topical three times a day PRN rib pain          Allergies    penicillins (Hives)  Tetanus Immune Globulin (Unknown)  adhesives (Rash)  Typhoid Vaccines (Anaphylaxis)    Intolerances              LABS:                           9.1    8.32  )-----------( 190      ( 23 Dec 2024 06:20 )             28.7     12-23    137  |  97  |  16  ----------------------------<  111[H]  3.4[L]   |  34[H]  |  0.67    Ca    8.8      23 Dec 2024 06:20    TPro  7.1  /  Alb  2.3[L]  /  TBili  0.5  /  DBili  x   /  AST  21  /  ALT  30  /  AlkPhos  204[H]  12-23      Urinalysis Basic - ( 23 Dec 2024 06:20 )    Color: x / Appearance: x / SG: x / pH: x  Gluc: 111 mg/dL / Ketone: x  / Bili: x / Urobili: x   Blood: x / Protein: x / Nitrite: x   Leuk Esterase: x / RBC: x / WBC x   Sq Epi: x / Non Sq Epi: x / Bacteria: x      CAPILLARY BLOOD GLUCOSE      POCT Blood Glucose.: 123 mg/dL (25 Dec 2024 08:04)  POCT Blood Glucose.: 115 mg/dL (24 Dec 2024 20:49)  POCT Blood Glucose.: 159 mg/dL (24 Dec 2024 16:37)  POCT Blood Glucose.: 168 mg/dL (24 Dec 2024 11:46)                    RECENT CULTURES:          RADIOLOGY & ADDITIONAL TESTS:       Follow up in 2 days to have the wound reviewed by the ED team.    Take the medication as prescribed.    Keep the arm elevated.      Cellulitis    Cellulitis is a skin infection caused by bacteria. This condition occurs most often in the arms and lower legs but can occur anywhere over the body. Symptoms include redness, swelling, warm skin, tenderness, and chills/fever. If you were prescribed an antibiotic medicine, take it as told by your health care provider. Do not stop taking the antibiotic even if you start to feel better.    SEEK IMMEDIATE MEDICAL CARE IF YOU HAVE ANY OF THE FOLLOWING SYMPTOMS: worsening fever, red streaks coming from affected area, vomiting or diarrhea, or dizziness/lightheadedness.

## 2024-12-25 NOTE — PROGRESS NOTE ADULT - ASSESSMENT
79 year old, obese, female with PMH of DAYANA (on CPAP), aortic stenosis, HTN, HLD, current smoker, COPD, Type 2 Diabetes Mellitus, and non-alcoholic fatty liver disease; who presented to [facility] on 11/20 for a scheduled TAVR, and possible stent placement.  She reports several month history of DUFF while walking and climbing up stairs, fatigue and BL LE edema. On 11/20 she underwent TAVR, drainage of retroperitoneal hematoma and pericardial window with Dr. Garland and Noah. Intraoperatively, she had an episode of Vtach (on Amiodarone).      Her hospital course was complicated by the following: acute hypoxic respiratory failure (requiring BiPAP and eventual intubation on 11/25), hemorrhagic shock, AFIB w/ RVR, coffee ground emesis, Code Blue (s/p CPR resulting in rib fractures), AIDA, fever secondary to aspiration PNA, +pseudomonas in sputum, persistent leukocytosis and low grade fevers (on Vanco per ID), L lateral abdominal wall hematoma, distended gallbladder and gallbladder sludge, dysphagia (s/p trache and PEG placement on 12/3), decannulation (12/13), L groin delayed wound healing (required wound vac), and hyperglycemia.  Patient now admitted for a multidisciplinary rehab program. 12-19-24 @ 13:25        #Aortic stenosis S/p TAVR  - Comprehensive Rehab Program of PT/OT/SLP  - Plavix 75mg daily     #HTN  #AFIB w/ RVR  - Amiodarone 200mg daily   - Bumex 1mg daily   - Metoprolol 12.5mg BID   - No therapeutic AC as pt had mass tranfusion protocol at [facility]  - started on dvt ppx    #Type 2 Diabetes Mellitus  - A1c: 6.4% (Dec 2024)    - FS AC & HS  - Mod ISS  - Lantus 12U HS  - Monitor BGM    #DAYANA  #COPD  - Duo-neb q 6hrs    #Acute Hypoxic Respiratory Failures  - multifactorial, resolved  - decannulated 12/3  - PRN: Nebulizer QID   - Mucinex  - Chest PT  - on 1L NC; wean as tolerated as patient does not wear o2 at home.    #Mood  - Escitalopram 20mg daily     #Dysphagia  - tolerating oral feeds,   - PEG to continue in situ and f/u GI outpatient  (placed 12/3)    #GI/Bowel Mgmt   - Pantoprazole  - Senna , Miralax     # GI ppx: Pantoprazole  # DVT: lovenoxGeovany

## 2024-12-25 NOTE — PROGRESS NOTE ADULT - SUBJECTIVE AND OBJECTIVE BOX
Pt. seen and examined at bedside.  No overnight events.  Wants G-tube removed.      REVIEW OF SYSTEMS  Constitutional - No fever,  No fatigue  Neurological - No headaches, ++ loss of strength  Musculoskeletal - No joint pain, No joint swelling, No muscle pain    VITALS  T(C): 36.4 (12-25-24 @ 08:02), Max: 37.2 (12-24-24 @ 20:30)  HR: 64 (12-25-24 @ 08:02) (64 - 87)  BP: 139/84 (12-25-24 @ 08:02) (139/84 - 149/81)  RR: 16 (12-25-24 @ 08:02) (16 - 16)  SpO2: 95% (12-25-24 @ 08:02) (95% - 97%)  Wt(kg): --       MEDICATIONS   acetaminophen     Tablet .. 650 milliGRAM(s) every 6 hours PRN  albuterol/ipratropium for Nebulization 3 milliLiter(s) every 6 hours  aMIOdarone    Tablet 200 milliGRAM(s) daily  AQUAPHOR (petrolatum Ointment) 1 Application(s) two times a day  buMETAnide 1 milliGRAM(s) daily  clopidogrel Tablet 75 milliGRAM(s) daily  dextrose 5%. 1000 milliLiter(s) <Continuous>  dextrose 5%. 1000 milliLiter(s) <Continuous>  dextrose 50% Injectable 25 Gram(s) once  dextrose 50% Injectable 12.5 Gram(s) once  dextrose 50% Injectable 25 Gram(s) once  dextrose Oral Gel 15 Gram(s) once PRN  enoxaparin Injectable 40 milliGRAM(s) every 24 hours  escitalopram 20 milliGRAM(s) at bedtime  glucagon  Injectable 1 milliGRAM(s) once  guaiFENesin  milliGRAM(s) every 12 hours  insulin glargine Injectable (LANTUS) 12 Unit(s) at bedtime  insulin lispro (ADMELOG) corrective regimen sliding scale   three times a day before meals  insulin lispro (ADMELOG) corrective regimen sliding scale   at bedtime  lidocaine 5% Ointment 1 Application(s) three times a day PRN  metoprolol tartrate 12.5 milliGRAM(s) two times a day  pantoprazole    Tablet 40 milliGRAM(s) before breakfast  polyethylene glycol 3350 17 Gram(s) daily  senna 2 Tablet(s) at bedtime      RECENT LABS/IMAGING                      POCT Blood Glucose.: 123 mg/dL (12-25-24 @ 08:04)  POCT Blood Glucose.: 115 mg/dL (12-24-24 @ 20:49)  POCT Blood Glucose.: 159 mg/dL (12-24-24 @ 16:37)  POCT Blood Glucose.: 168 mg/dL (12-24-24 @ 11:46)    ---------  PHYSICAL EXAM  Constitutional - NAD, Comfortable  Pulm - Breathing comfortably, No wheezing  Abd - Soft, NTND, + G TUBE  Extremities - No edema, No calf tenderness  Neurologic Exam -                    Cognitive - Awake, Alert     Communication - Fluent     Motor - No focal deficits     Sensory - Intact to LT  Psychiatric - Mood WNL, Affect WNL    ASSESSMENT/PLAN  79y Female with functional deficits 2' Aortic Valve Disease s/p TAVR.  Continue current medical management  Pain - Tylenol PRN  DVT PPX - enoxaparin Injectable 40 milliGRAM(s) every 24 hours  Active issues - none; may need to have G-tube in for 6 weeks total but will sign out to AM team  Continue 3hrs a day of comprehensive rehab program.

## 2024-12-26 LAB
ALBUMIN SERPL ELPH-MCNC: 2.3 G/DL — LOW (ref 3.3–5)
ALP SERPL-CCNC: 176 U/L — HIGH (ref 40–120)
ALT FLD-CCNC: 19 U/L — SIGNIFICANT CHANGE UP (ref 10–45)
ANION GAP SERPL CALC-SCNC: 8 MMOL/L — SIGNIFICANT CHANGE UP (ref 5–17)
AST SERPL-CCNC: 16 U/L — SIGNIFICANT CHANGE UP (ref 10–40)
BASOPHILS # BLD AUTO: 0.03 K/UL — SIGNIFICANT CHANGE UP (ref 0–0.2)
BASOPHILS NFR BLD AUTO: 0.4 % — SIGNIFICANT CHANGE UP (ref 0–2)
BILIRUB SERPL-MCNC: 0.3 MG/DL — SIGNIFICANT CHANGE UP (ref 0.2–1.2)
BUN SERPL-MCNC: 16 MG/DL — SIGNIFICANT CHANGE UP (ref 7–23)
CALCIUM SERPL-MCNC: 8.7 MG/DL — SIGNIFICANT CHANGE UP (ref 8.4–10.5)
CHLORIDE SERPL-SCNC: 103 MMOL/L — SIGNIFICANT CHANGE UP (ref 96–108)
CO2 SERPL-SCNC: 29 MMOL/L — SIGNIFICANT CHANGE UP (ref 22–31)
CREAT SERPL-MCNC: 0.64 MG/DL — SIGNIFICANT CHANGE UP (ref 0.5–1.3)
EGFR: 91 ML/MIN/1.73M2 — SIGNIFICANT CHANGE UP
EOSINOPHIL # BLD AUTO: 0.11 K/UL — SIGNIFICANT CHANGE UP (ref 0–0.5)
EOSINOPHIL NFR BLD AUTO: 1.5 % — SIGNIFICANT CHANGE UP (ref 0–6)
GLUCOSE BLDC GLUCOMTR-MCNC: 111 MG/DL — HIGH (ref 70–99)
GLUCOSE BLDC GLUCOMTR-MCNC: 118 MG/DL — HIGH (ref 70–99)
GLUCOSE BLDC GLUCOMTR-MCNC: 127 MG/DL — HIGH (ref 70–99)
GLUCOSE BLDC GLUCOMTR-MCNC: 128 MG/DL — HIGH (ref 70–99)
GLUCOSE SERPL-MCNC: 115 MG/DL — HIGH (ref 70–99)
HCT VFR BLD CALC: 29.2 % — LOW (ref 34.5–45)
HGB BLD-MCNC: 9.1 G/DL — LOW (ref 11.5–15.5)
IMM GRANULOCYTES NFR BLD AUTO: 4.8 % — HIGH (ref 0–0.9)
LYMPHOCYTES # BLD AUTO: 1.21 K/UL — SIGNIFICANT CHANGE UP (ref 1–3.3)
LYMPHOCYTES # BLD AUTO: 16.5 % — SIGNIFICANT CHANGE UP (ref 13–44)
MCHC RBC-ENTMCNC: 31.1 PG — SIGNIFICANT CHANGE UP (ref 27–34)
MCHC RBC-ENTMCNC: 31.2 G/DL — LOW (ref 32–36)
MCV RBC AUTO: 99.7 FL — SIGNIFICANT CHANGE UP (ref 80–100)
MONOCYTES # BLD AUTO: 0.54 K/UL — SIGNIFICANT CHANGE UP (ref 0–0.9)
MONOCYTES NFR BLD AUTO: 7.4 % — SIGNIFICANT CHANGE UP (ref 2–14)
NEUTROPHILS # BLD AUTO: 5.09 K/UL — SIGNIFICANT CHANGE UP (ref 1.8–7.4)
NEUTROPHILS NFR BLD AUTO: 69.4 % — SIGNIFICANT CHANGE UP (ref 43–77)
NRBC # BLD: 0 /100 WBCS — SIGNIFICANT CHANGE UP (ref 0–0)
PLATELET # BLD AUTO: 193 K/UL — SIGNIFICANT CHANGE UP (ref 150–400)
POTASSIUM SERPL-MCNC: 4 MMOL/L — SIGNIFICANT CHANGE UP (ref 3.5–5.3)
POTASSIUM SERPL-SCNC: 4 MMOL/L — SIGNIFICANT CHANGE UP (ref 3.5–5.3)
PROT SERPL-MCNC: 6.8 G/DL — SIGNIFICANT CHANGE UP (ref 6–8.3)
RBC # BLD: 2.93 M/UL — LOW (ref 3.8–5.2)
RBC # FLD: 19.2 % — HIGH (ref 10.3–14.5)
SODIUM SERPL-SCNC: 140 MMOL/L — SIGNIFICANT CHANGE UP (ref 135–145)
WBC # BLD: 7.33 K/UL — SIGNIFICANT CHANGE UP (ref 3.8–10.5)
WBC # FLD AUTO: 7.33 K/UL — SIGNIFICANT CHANGE UP (ref 3.8–10.5)

## 2024-12-26 PROCEDURE — 99232 SBSQ HOSP IP/OBS MODERATE 35: CPT

## 2024-12-26 PROCEDURE — 93010 ELECTROCARDIOGRAM REPORT: CPT

## 2024-12-26 RX ADMIN — INSULIN GLARGINE-YFGN 12 UNIT(S): 100 INJECTION, SOLUTION SUBCUTANEOUS at 21:38

## 2024-12-26 RX ADMIN — CLOPIDOGREL BISULFATE 75 MILLIGRAM(S): 75 TABLET, FILM COATED ORAL at 11:35

## 2024-12-26 RX ADMIN — Medication 600 MILLIGRAM(S): at 06:38

## 2024-12-26 RX ADMIN — ENOXAPARIN SODIUM 40 MILLIGRAM(S): 60 INJECTION INTRAVENOUS; SUBCUTANEOUS at 17:16

## 2024-12-26 RX ADMIN — Medication 600 MILLIGRAM(S): at 17:17

## 2024-12-26 RX ADMIN — Medication 12.5 MILLIGRAM(S): at 06:38

## 2024-12-26 RX ADMIN — AMIODARONE HYDROCHLORIDE 200 MILLIGRAM(S): 200 TABLET ORAL at 06:38

## 2024-12-26 RX ADMIN — IPRATROPIUM BROMIDE AND ALBUTEROL SULFATE 3 MILLILITER(S): .5; 2.5 SOLUTION RESPIRATORY (INHALATION) at 16:13

## 2024-12-26 RX ADMIN — BUMETANIDE 1 MILLIGRAM(S): 2 TABLET ORAL at 06:38

## 2024-12-26 RX ADMIN — Medication 12.5 MILLIGRAM(S): at 17:17

## 2024-12-26 RX ADMIN — Medication 1 APPLICATION(S): at 17:17

## 2024-12-26 RX ADMIN — PANTOPRAZOLE 40 MILLIGRAM(S): 40 TABLET, DELAYED RELEASE ORAL at 06:38

## 2024-12-26 RX ADMIN — ESCITALOPRAM OXALATE 20 MILLIGRAM(S): 10 TABLET ORAL at 21:39

## 2024-12-26 NOTE — PROGRESS NOTE ADULT - ASSESSMENT
79 year old, obese, female with PMH of DAYANA (on CPAP), aortic stenosis, HTN, HLD, current smoker, COPD, Type 2 Diabetes Mellitus, and non-alcoholic fatty liver disease; who presented to [facility] on 11/20 for a scheduled TAVR, and possible stent placement.  She reports several month history of DUFF while walking and climbing up stairs, fatigue and BL LE edema. On 11/20 she underwent TAVR, drainage of retroperitoneal hematoma and pericardial window with Dr. Garland and Noah. Intraoperatively, she had an episode of Vtach (on Amiodarone).    Her hospital course was complicated by the following: acute hypoxic respiratory failure (requiring BiPAP and eventual intubation on 11/25), hemorrhagic shock, AFIB w/ RVR, coffee ground emesis, Code Blue (s/p CPR resulting in rib fractures), AIDA, fever secondary to aspiration PNA, +pseudomonas in sputum, persistent leukocytosis and low grade fevers (on Vanco per ID), L lateral abdominal wall hematoma, distended gallbladder and gallbladder sludge, dysphagia (s/p trache and PEG placement on 12/3), decannulation (12/13), L groin delayed wound healing (requiring wound vac), and hyperglycemia.  Patient now admitted for a multidisciplinary rehab program. 12-19-24 @ 13:25    * Left groin wound - wound recs appreciated   * Cognitive deficits - SLP following   * Cough - CXR with R lung base atelectasis - encourage Incentive Spirometer    #Aortic valve disease S/p TAVR  - Gait Instability, ADL impairments and Functional impairments:   Commence Comprehensive Rehab Program of PT/OT/SLP  - 3 hours a day, 5 days a week  - ASA 81mg daily  - Plavix 75mg daily     REHAB ISSUES  Decreased ambulatory distance  Left groin wound  Impaired balance  Resp failure     #Acute Hypoxic Respiratory Failures  #Cough  - PRN: Nebulizer QID   - on 1L NC; wean as tolerated as patient does not wear o2 at home.  - CXR with R lung base atelectasis  - Mucinex BID  - Chest PT   - Incentive Spirometer     #HTN  #AFIB w/ RVR  - Amiodarone 200mg daily   - Bumex 1mg daily   - Metoprolol 12.5mg BID     #Type 2 Diabetes Mellitus  - A1c: 6.4% (Dec 2024)    - FS AC & HS  - Mod ISS  - Lantus 12U HS    #Mood  - Escitalopram 20mg daily     #Skin  - Skin -- Trach site 1x0.5cm with scan tan drainage with gauze and tape, Mid sternum incision well approximated, healed 5.5cm, MUNIRA, abd bruising, perineal bruising, L groin incision [id number] incision, wet to dry packing with gauze, CDI, L flank bruising, generalized ecchymosis and scabbing, b/l dry feet   - Aquaphor BID   - Pressure injury/Skin: OOB to Chair, PT/OT   - Left groin wound - wound care recs appreciated   -- Suggest moisten dressing before removal, then daily Normal Saline Cleanse of Left groin wound, pat thoroughly dry.  Apply Cavillon film barrier daily to intact periwound skin, allow to dry.  Gently pack area daily with saline moistened non-woven gauze (half of one piece), cover with folded dry non-woven gauze, cover with Tegaderm.      #Pain Mgmt   - PRN: Tylenol     #GI/Bowel Mgmt   - Pantoprazole  - Senna , Miralax    #/Bladder Mgmt --voiding     #FEN   - Diet - Regular with Thin Liquids   - Dysphagia  SLP - evaluation and treatment  - S/p MBS on 12/23 - passed, diet upgraded (as above)  - Dysphagia--tolerating oral feeds, PEG to continue in situ and f/u GI outpatient  (placed 12/3)     #Precautions / PROPHYLAXIS:   - Falls, Cardiac  - ortho: Weight bearing status: WBAT   - Lungs: Aspiration, Incentive Spirometer   - DVT: ASA 81mg daily & Plavix 75mg daily, TEDs   ----------------------------------------------  Next of Kin:   Daughter: Janie Drake: [phone]  ----------------------------------------------    IDT 12/24  Therapy-- Ambulating 50ft RW with min assist  SLP--Mod cog deficits, impulsivity  Barriers--left groin wound, cognitive deficits  Est 1/4  Dr. SMITH's Liaison with Family/Providers:    -----------------------------------------------  OUTPATIENT/FOLLOW UP:    Todd Devine MD  Vascular surgery, General surgery  [address] [facility] [address] [facility]  [address]  [place] [address]  [phone]    Todd Odom MD  Cardiology, Interventional  [facility]   [address]  [address]  [facility], [address] [phone]  ----------------------------------------------- 79 year old, obese, female with PMH of DAYANA (on CPAP), aortic stenosis, HTN, HLD, current smoker, COPD, Type 2 Diabetes Mellitus, and non-alcoholic fatty liver disease; who presented to [facility] on 11/20 for a scheduled TAVR, and possible stent placement.  She reports several month history of DUFF while walking and climbing up stairs, fatigue and BL LE edema. On 11/20 she underwent TAVR, drainage of retroperitoneal hematoma and pericardial window with Dr. Garland and Noah. Intraoperatively, she had an episode of Vtach (on Amiodarone).    Her hospital course was complicated by the following: acute hypoxic respiratory failure (requiring BiPAP and eventual intubation on 11/25), hemorrhagic shock, AFIB w/ RVR, coffee ground emesis, Code Blue (s/p CPR resulting in rib fractures), AIDA, fever secondary to aspiration PNA, +pseudomonas in sputum, persistent leukocytosis and low grade fevers (on Vanco per ID), L lateral abdominal wall hematoma, distended gallbladder and gallbladder sludge, dysphagia (s/p trache and PEG placement on 12/3), decannulation (12/13), L groin delayed wound healing (requiring wound vac), and hyperglycemia.  Patient now admitted for a multidisciplinary rehab program. 12-19-24 @ 13:25    * Left groin wound - wound recs appreciated   * Cognitive deficits - SLP following   * Cough - CXR with R lung base atelectasis - encourage Incentive Spirometer  * Labs unremarkable     #Aortic valve disease S/p TAVR  - Gait Instability, ADL impairments and Functional impairments:   Continue Comprehensive Rehab Program of PT/OT/SLP  - 3 hours a day, 5 days a week  - ASA 81mg daily  - Plavix 75mg daily     REHAB ISSUES  Decreased ambulatory distance  Left groin wound  Impaired balance  Resp failure     #Acute Hypoxic Respiratory Failures  #Cough  - PRN: Nebulizer QID   - on 1L NC; wean as tolerated as patient does not wear o2 at home.  - CXR with R lung base atelectasis  - Mucinex BID  - Chest PT   - Incentive Spirometer     #HTN  #AFIB w/ RVR  - Amiodarone 200mg daily   - Bumex 1mg daily   - Metoprolol 12.5mg BID     #Type 2 Diabetes Mellitus  - A1c: 6.4% (Dec 2024)    - FS AC & HS  - Mod ISS  - Lantus 12U HS    #Mood  - Escitalopram 20mg daily     #Skin  - Skin -- Trach site 1x0.5cm with scan tan drainage with gauze and tape, Mid sternum incision well approximated, healed 5.5cm, MUNIRA, abd bruising, perineal bruising, L groin incision [id number] incision, wet to dry packing with gauze, CDI, L flank bruising, generalized ecchymosis and scabbing, b/l dry feet   - Aquaphor BID   - Pressure injury/Skin: OOB to Chair, PT/OT   - Left groin wound - wound care recs appreciated   -- Suggest moisten dressing before removal, then daily Normal Saline Cleanse of Left groin wound, pat thoroughly dry.  Apply Cavillon film barrier daily to intact periwound skin, allow to dry.  Gently pack area daily with saline moistened non-woven gauze (half of one piece), cover with folded dry non-woven gauze, cover with Tegaderm.      #Pain Mgmt   - PRN: Tylenol     #GI/Bowel Mgmt   - Pantoprazole  - Senna , Miralax    #/Bladder Mgmt --voiding     #FEN   - Diet - Regular with Thin Liquids   - Dysphagia  SLP - evaluation and treatment  - S/p MBS on 12/23 - passed, diet upgraded (as above)  - Dysphagia--tolerating oral feeds, PEG to continue in situ and f/u GI outpatient  (placed 12/3)     #Precautions / PROPHYLAXIS:   - Falls, Cardiac  - ortho: Weight bearing status: WBAT   - Lungs: Aspiration, Incentive Spirometer   - DVT: ASA 81mg daily & Plavix 75mg daily, TEDs   ----------------------------------------------  Next of Kin:   Daughter: Janie Drake: [phone]  ----------------------------------------------    IDT 12/24  Therapy-- Ambulating 50ft RW with min assist  SLP--Mod cog deficits, impulsivity  Barriers--left groin wound, cognitive deficits  Est 1/4  Dr. O's Liaison with Family/Providers:    -----------------------------------------------  OUTPATIENT/FOLLOW UP:    Todd Devine MD  Vascular surgery, General surgery  [address] [facility]  [address]  [place] [address]  [phone]    Todd Odom MD  Cardiology, Interventional  [facility]    [address]  [address]  [facility], [address]  [phone]  -----------------------------------------------

## 2024-12-26 NOTE — PROGRESS NOTE ADULT - SUBJECTIVE AND OBJECTIVE BOX
CC: S/p TAVR     Today's Subjective & Objective Findings:  Patient seen and examined at bedside this AM.  Admits to sleeping well.   Requesting PEG removal.   Reports BL hand tremors since surgery, not noted today.   Has been refusing senna, and miralax.   Discussed PEG removal for 6 weeks post insertion.       Denies headache, dizziness, visual changes, chest pain, SOB/DUFF, abdominal pain, nausea, vomiting, diarrhea, dysuria, numbness or tingling. LBM 12/25    Therapy-- Ambulating 50ft RW with min assist  SLP--Mod cog deficits, impulsivity  Barriers--left groin wound, cognitive deficits  Est 1/4    Vital Signs Last 24 Hrs  T(C): 36.6 (26 Dec 2024 07:44), Max: 36.7 (25 Dec 2024 22:06)  T(F): 97.8 (26 Dec 2024 07:44), Max: 98 (25 Dec 2024 22:06)  HR: 78 (26 Dec 2024 07:44) (73 - 78)  BP: 155/77 (26 Dec 2024 07:44) (127/71 - 155/77)  BP(mean): --  RR: 16 (26 Dec 2024 07:44) (16 - 16)  SpO2: 96% (26 Dec 2024 07:44) (94% - 96%)      PHYSICAL EXAM  Constitutional - No acute distress, Comfortable  Neck - Supple  Cardiovascular - Palpable peripheral pulses   Respiratory/Chest- decreased air entry b/l lower lobes and few rhonchi  Abdomen - Soft, Non-tender  Extremities - No cyanosis, No edema,     Neurologic Exam - Alert, Oriented, Interactive  Sensory - Light touch sensation intact  Motor Function - Moves all extremities spontaneously  Skin -  Trach site 1x0.5cm with scan tan drainage with gauze and tape, Mid sternum incision well approximated, healed 5.5cm, MUNIRA, abd bruising, perineal bruising, L groin incision [id number] incision, wet to dry packing with gauze, CDI, L flank bruising, generalized ecchymosis and scabbing, b/l dry feet    LABS:                        9.1    7.33  )-----------( 193      ( 26 Dec 2024 06:10 )             29.2     12-26    140  |  103  |  16  ----------------------------<  115[H]  4.0   |  29  |  0.64    Ca    8.7      26 Dec 2024 06:10    TPro  6.8  /  Alb  2.3[L]  /  TBili  0.3  /  DBili  x   /  AST  16  /  ALT  19  /  AlkPhos  176[H]  12-26      Urinalysis Basic - ( 26 Dec 2024 06:10 )    Color: x / Appearance: x / SG: x / pH: x  Gluc: 115 mg/dL / Ketone: x  / Bili: x / Urobili: x   Blood: x / Protein: x / Nitrite: x   Leuk Esterase: x / RBC: x / WBC x   Sq Epi: x / Non Sq Epi: x / Bacteria: x        < from: Xray Cinesophagram Swallow Function w/ Contrast (12.23.24 @ 10:32) >  R SWAL FUNC JOSE RAFAEL VID CON STDY   ORDERED BY:  SOURAV SHEETS     PROCEDURE DATE:  12/23/2024      INTERPRETATION:  Swallowing function video. Patient has swallowing   difficulty. Barium refuses contrast.    There was somepooling in the valleculae and somewhat more pooling in the   piriforms. No penetration or aspiration was seen during the study.    IMPRESSION: As above.    --- End of Report ---    < end of copied text >    CAPILLARY BLOOD GLUCOSE  POCT Blood Glucose.: 128 mg/dL (26 Dec 2024 07:45)  POCT Blood Glucose.: 126 mg/dL (25 Dec 2024 22:10)  POCT Blood Glucose.: 145 mg/dL (25 Dec 2024 17:06)  POCT Blood Glucose.: 148 mg/dL (25 Dec 2024 12:17)          MEDICATIONS  (STANDING):  albuterol/ipratropium for Nebulization 3 milliLiter(s) Nebulizer every 6 hours  aMIOdarone    Tablet 200 milliGRAM(s) Oral daily  AQUAPHOR (petrolatum Ointment) 1 Application(s) Topical two times a day  buMETAnide 1 milliGRAM(s) Oral daily  clopidogrel Tablet 75 milliGRAM(s) Oral daily  dextrose 5%. 1000 milliLiter(s) (50 mL/Hr) IV Continuous <Continuous>  dextrose 5%. 1000 milliLiter(s) (100 mL/Hr) IV Continuous <Continuous>  dextrose 50% Injectable 25 Gram(s) IV Push once  dextrose 50% Injectable 12.5 Gram(s) IV Push once  dextrose 50% Injectable 25 Gram(s) IV Push once  enoxaparin Injectable 40 milliGRAM(s) SubCutaneous every 24 hours  escitalopram 20 milliGRAM(s) Oral at bedtime  glucagon  Injectable 1 milliGRAM(s) IntraMuscular once  guaiFENesin  milliGRAM(s) Oral every 12 hours  insulin glargine Injectable (LANTUS) 12 Unit(s) SubCutaneous at bedtime  insulin lispro (ADMELOG) corrective regimen sliding scale   SubCutaneous three times a day before meals  insulin lispro (ADMELOG) corrective regimen sliding scale   SubCutaneous at bedtime  metoprolol tartrate 12.5 milliGRAM(s) Oral two times a day  pantoprazole    Tablet 40 milliGRAM(s) Oral before breakfast    MEDICATIONS  (PRN):  acetaminophen     Tablet .. 650 milliGRAM(s) Oral every 6 hours PRN Mild Pain (1 - 3)  dextrose Oral Gel 15 Gram(s) Oral once PRN Blood Glucose LESS THAN 70 milliGRAM(s)/deciliter  lidocaine 5% Ointment 1 Application(s) Topical three times a day PRN rib pain   CC: S/p TAVR     Today's Subjective & Objective Findings:  Patient seen and examined at bedside this AM.  Admits to sleeping well.   Requesting PEG removal.   Reports BL hand tremors since surgery, not noted today.   Has been refusing senna, and miralax.   Discussed PEG removal for 6 weeks post insertion.     Denies headache, dizziness, visual changes, chest pain, SOB/DUFF, abdominal pain, nausea, vomiting, diarrhea, dysuria, numbness or tingling. LBM 12/25    Therapy-- Ambulating 50ft RW with min assist  SLP--Mod cog deficits, impulsivity  Barriers--left groin wound, cognitive deficits  Est 1/4    Vital Signs Last 24 Hrs  T(C): 36.6 (26 Dec 2024 07:44), Max: 36.7 (25 Dec 2024 22:06)  T(F): 97.8 (26 Dec 2024 07:44), Max: 98 (25 Dec 2024 22:06)  HR: 78 (26 Dec 2024 07:44) (73 - 78)  BP: 155/77 (26 Dec 2024 07:44) (127/71 - 155/77)  RR: 16 (26 Dec 2024 07:44) (16 - 16)  SpO2: 96% (26 Dec 2024 07:44) (94% - 96%)    PHYSICAL EXAM  Constitutional - No acute distress, Comfortable  Neck - Supple  Cardiovascular - Palpable peripheral pulses   Respiratory/Chest- decreased air entry b/l lower lobes and few rhonchi  Abdomen - Soft, Non-tender  Extremities - No cyanosis, No edema,     Neurologic Exam - Alert, Oriented  Sensory - Light touch sensation intact  Motor Function - Moves all extremities spontaneously  Skin -  Trach site 1x0.5cm with scan tan drainage with gauze and tape, Mid sternum incision well approximated, healed 5.5cm, MUNIRA, abd bruising, perineal bruising, L groin incision [id number] incision, wet to dry packing with gauze, CDI, L flank bruising, generalized ecchymosis and scabbing, b/l dry feet    LABS:                        9.1    7.33  )-----------( 193      ( 26 Dec 2024 06:10 )             29.2     12-26    140  |  103  |  16  ----------------------------<  115[H]  4.0   |  29  |  0.64    Ca    8.7      26 Dec 2024 06:10    TPro  6.8  /  Alb  2.3[L]  /  TBili  0.3  /  DBili  x   /  AST  16  /  ALT  19  /  AlkPhos  176[H]  12-26      Urinalysis Basic - ( 26 Dec 2024 06:10 )    Color: x / Appearance: x / SG: x / pH: x  Gluc: 115 mg/dL / Ketone: x  / Bili: x / Urobili: x   Blood: x / Protein: x / Nitrite: x   Leuk Esterase: x / RBC: x / WBC x   Sq Epi: x / Non Sq Epi: x / Bacteria: x    < from: Xray Cinesophagram Swallow Function w/ Contrast (12.23.24 @ 10:32) >  R SWAL FUNC JOSE RAFAEL VID CON STDY   ORDERED BY:  SOURAV SHEETS     PROCEDURE DATE:  12/23/2024      INTERPRETATION:  Swallowing function video. Patient has swallowing   difficulty. Barium refuses contrast.    There was somepooling in the valleculae and somewhat more pooling in the   piriforms. No penetration or aspiration was seen during the study.    IMPRESSION: As above.    --- End of Report ---      CAPILLARY BLOOD GLUCOSE  POCT Blood Glucose.: 128 mg/dL (26 Dec 2024 07:45)  POCT Blood Glucose.: 126 mg/dL (25 Dec 2024 22:10)  POCT Blood Glucose.: 145 mg/dL (25 Dec 2024 17:06)  POCT Blood Glucose.: 148 mg/dL (25 Dec 2024 12:17)    MEDICATIONS  (STANDING):  albuterol/ipratropium for Nebulization 3 milliLiter(s) Nebulizer every 6 hours  aMIOdarone    Tablet 200 milliGRAM(s) Oral daily  AQUAPHOR (petrolatum Ointment) 1 Application(s) Topical two times a day  buMETAnide 1 milliGRAM(s) Oral daily  clopidogrel Tablet 75 milliGRAM(s) Oral daily  dextrose 5%. 1000 milliLiter(s) (50 mL/Hr) IV Continuous <Continuous>  dextrose 5%. 1000 milliLiter(s) (100 mL/Hr) IV Continuous <Continuous>  dextrose 50% Injectable 25 Gram(s) IV Push once  dextrose 50% Injectable 12.5 Gram(s) IV Push once  dextrose 50% Injectable 25 Gram(s) IV Push once  enoxaparin Injectable 40 milliGRAM(s) SubCutaneous every 24 hours  escitalopram 20 milliGRAM(s) Oral at bedtime  glucagon  Injectable 1 milliGRAM(s) IntraMuscular once  guaiFENesin  milliGRAM(s) Oral every 12 hours  insulin glargine Injectable (LANTUS) 12 Unit(s) SubCutaneous at bedtime  insulin lispro (ADMELOG) corrective regimen sliding scale   SubCutaneous three times a day before meals  insulin lispro (ADMELOG) corrective regimen sliding scale   SubCutaneous at bedtime  metoprolol tartrate 12.5 milliGRAM(s) Oral two times a day  pantoprazole    Tablet 40 milliGRAM(s) Oral before breakfast    MEDICATIONS  (PRN):  acetaminophen     Tablet .. 650 milliGRAM(s) Oral every 6 hours PRN Mild Pain (1 - 3)  dextrose Oral Gel 15 Gram(s) Oral once PRN Blood Glucose LESS THAN 70 milliGRAM(s)/deciliter  lidocaine 5% Ointment 1 Application(s) Topical three times a day PRN rib pain

## 2024-12-26 NOTE — PROGRESS NOTE ADULT - ASSESSMENT
79 year old, obese, female with PMH of DAYANA (on CPAP), aortic stenosis, HTN, HLD, current smoker, COPD, Type 2 Diabetes Mellitus, and non-alcoholic fatty liver disease; who presented to [facility] on 11/20 for a scheduled TAVR, and possible stent placement.  She reports several month history of DUFF while walking and climbing up stairs, fatigue and BL LE edema. On 11/20 she underwent TAVR, drainage of retroperitoneal hematoma and pericardial window with Dr. Garland and Noah. Intraoperatively, she had an episode of Vtach (on Amiodarone).      Her hospital course was complicated by the following: acute hypoxic respiratory failure (requiring BiPAP and eventual intubation on 11/25), hemorrhagic shock, AFIB w/ RVR, coffee ground emesis, Code Blue (s/p CPR resulting in rib fractures), AIDA, fever secondary to aspiration PNA, +pseudomonas in sputum, persistent leukocytosis and low grade fevers (on Vanco per ID), L lateral abdominal wall hematoma, distended gallbladder and gallbladder sludge, dysphagia (s/p trache and PEG placement on 12/3), decannulation (12/13), L groin delayed wound healing (required wound vac), and hyperglycemia.  Patient now admitted for a multidisciplinary rehab program. 12-19-24 @ 13:25        #Aortic stenosis S/p TAVR  - Comprehensive Rehab Program of PT/OT/SLP  - Plavix 75mg daily     #HTN  #AFIB w/ RVR  - Amiodarone 200mg daily   - Bumex 1mg daily   - Metoprolol 12.5mg BID   - No therapeutic AC as pt had mass tranfusion protocol at [facility]  - started on dvt ppx    #Type 2 Diabetes Mellitus  - A1c: 6.4% (Dec 2024)    - FS AC & HS  - Mod ISS  - Lantus 12U HS  - Monitor BGM  - Can be discharge on lantus. Pt to discontinue home glimepiride     #DAYANA  #COPD  - Duo-neb q 6hrs  - outpt pft studies and further evaluation    #Acute Hypoxic Respiratory Failures  - multifactorial, resolved  - decannulated 12/3  - PRN: Nebulizer QID   - Mucinex  - Chest PT    #Mood  - Escitalopram 20mg daily     #Dysphagia  - tolerating oral feeds,   - PEG to continue in situ and f/u GI outpatient  (placed 12/3)    #GI/Bowel Mgmt   - Pantoprazole  - Senna , Miralax     # GI ppx: Pantoprazole  # DVT: lovenox, Geovany

## 2024-12-26 NOTE — PROGRESS NOTE ADULT - SUBJECTIVE AND OBJECTIVE BOX
PROGRESS NOTE:     Patient is a 79y old  Female who presents with a chief complaint of Aortic Valve Disease s/p TAVR (23 Dec 2024 11:32)          SUBJECTIVE & OBJECTIVE:   Pt seen and examined at bedside in AM. inquiring about PEG tube removal.    no overnight events.     REVIEW OF SYSTEMS: remaining ROS negative     PHYSICAL EXAM:  VITALS:  Vital Signs Last 24 Hrs  T(C): 36.6 (26 Dec 2024 07:44), Max: 36.7 (25 Dec 2024 22:06)  T(F): 97.8 (26 Dec 2024 07:44), Max: 98 (25 Dec 2024 22:06)  HR: 78 (26 Dec 2024 07:44) (73 - 78)  BP: 155/77 (26 Dec 2024 07:44) (127/71 - 155/77)  BP(mean): --  RR: 16 (26 Dec 2024 07:44) (16 - 16)  SpO2: 96% (26 Dec 2024 07:44) (94% - 96%)    Parameters below as of 26 Dec 2024 09:06  Patient On (Oxygen Delivery Method): room air        GENERAL: NAD,  no increased WOB  HEAD:  Atraumatic, Normocephalic  EYES: EOMI,  conjunctiva and sclera clear  ENMT: Moist mucous membranes  NECK: Supple, No JVD  NERVOUS SYSTEM:  Alert & Oriented   CHEST/LUNG: Clear to auscultation bilaterally; No rales, rhonchi, wheezing  HEART: Regular rate and rhythm  ABDOMEN: Soft, Nontender, Nondistended; Bowel sounds present  EXTREMITIES: No clubbing, cyanosis      MEDICATIONS  (STANDING):  albuterol/ipratropium for Nebulization 3 milliLiter(s) Nebulizer every 6 hours  aMIOdarone    Tablet 200 milliGRAM(s) Oral daily  AQUAPHOR (petrolatum Ointment) 1 Application(s) Topical two times a day  buMETAnide 1 milliGRAM(s) Oral daily  clopidogrel Tablet 75 milliGRAM(s) Oral daily  dextrose 5%. 1000 milliLiter(s) (50 mL/Hr) IV Continuous <Continuous>  dextrose 5%. 1000 milliLiter(s) (100 mL/Hr) IV Continuous <Continuous>  dextrose 50% Injectable 25 Gram(s) IV Push once  dextrose 50% Injectable 12.5 Gram(s) IV Push once  dextrose 50% Injectable 25 Gram(s) IV Push once  enoxaparin Injectable 40 milliGRAM(s) SubCutaneous every 24 hours  escitalopram 20 milliGRAM(s) Oral at bedtime  glucagon  Injectable 1 milliGRAM(s) IntraMuscular once  guaiFENesin  milliGRAM(s) Oral every 12 hours  insulin glargine Injectable (LANTUS) 12 Unit(s) SubCutaneous at bedtime  insulin lispro (ADMELOG) corrective regimen sliding scale   SubCutaneous three times a day before meals  insulin lispro (ADMELOG) corrective regimen sliding scale   SubCutaneous at bedtime  metoprolol tartrate 12.5 milliGRAM(s) Oral two times a day  pantoprazole    Tablet 40 milliGRAM(s) Oral before breakfast    MEDICATIONS  (PRN):  acetaminophen     Tablet .. 650 milliGRAM(s) Oral every 6 hours PRN Mild Pain (1 - 3)  dextrose Oral Gel 15 Gram(s) Oral once PRN Blood Glucose LESS THAN 70 milliGRAM(s)/deciliter  lidocaine 5% Ointment 1 Application(s) Topical three times a day PRN rib pain          Allergies    penicillins (Hives)  Tetanus Immune Globulin (Unknown)  adhesives (Rash)  Typhoid Vaccines (Anaphylaxis)    Intolerances              LABS:                                      9.1    7.33  )-----------( 193      ( 26 Dec 2024 06:10 )             29.2     12-26    140  |  103  |  16  ----------------------------<  115[H]  4.0   |  29  |  0.64    Ca    8.7      26 Dec 2024 06:10    TPro  6.8  /  Alb  2.3[L]  /  TBili  0.3  /  DBili  x   /  AST  16  /  ALT  19  /  AlkPhos  176[H]  12-26    LIVER FUNCTIONS - ( 26 Dec 2024 06:10 )  Alb: 2.3 g/dL / Pro: 6.8 g/dL / ALK PHOS: 176 U/L / ALT: 19 U/L / AST: 16 U/L / GGT: x             Urinalysis Basic - ( 26 Dec 2024 06:10 )    Color: x / Appearance: x / SG: x / pH: x  Gluc: 115 mg/dL / Ketone: x  / Bili: x / Urobili: x   Blood: x / Protein: x / Nitrite: x   Leuk Esterase: x / RBC: x / WBC x   Sq Epi: x / Non Sq Epi: x / Bacteria: x        CAPILLARY BLOOD GLUCOSE      POCT Blood Glucose.: 127 mg/dL (26 Dec 2024 11:55)  POCT Blood Glucose.: 128 mg/dL (26 Dec 2024 07:45)  POCT Blood Glucose.: 126 mg/dL (25 Dec 2024 22:10)  POCT Blood Glucose.: 145 mg/dL (25 Dec 2024 17:06)                  RECENT CULTURES:          RADIOLOGY & ADDITIONAL TESTS:

## 2024-12-26 NOTE — PROGRESS NOTE ADULT - NS ATTEND AMEND GEN_ALL_CORE FT
I have made amendments to the documentation where necessary. Additional comments:   I have personally seen and examined the patient independently Medical records reviewed.   I have made amendments to the documentation where necessary and adjusted the history, physical examination, and plan as documented by the NP.       Patient requests removal of PEG  Explained to patient that PEG was placed 12/3, and would need to remain in situ for approx. before removal  Has eating orally, meds/diet for > 3 wks    Labs unremarkable     Continue therapy--balance, progressive ambulation  Will get GI review early next week to determine removal of PEG  Continue leg elevation and edema control  Wound care left groin

## 2024-12-27 LAB
GLUCOSE BLDC GLUCOMTR-MCNC: 118 MG/DL — HIGH (ref 70–99)
GLUCOSE BLDC GLUCOMTR-MCNC: 123 MG/DL — HIGH (ref 70–99)
GLUCOSE BLDC GLUCOMTR-MCNC: 133 MG/DL — HIGH (ref 70–99)
GLUCOSE BLDC GLUCOMTR-MCNC: 137 MG/DL — HIGH (ref 70–99)

## 2024-12-27 PROCEDURE — 99232 SBSQ HOSP IP/OBS MODERATE 35: CPT

## 2024-12-27 PROCEDURE — 99233 SBSQ HOSP IP/OBS HIGH 50: CPT

## 2024-12-27 RX ORDER — ASPIRIN 81 MG
81 TABLET, DELAYED RELEASE (ENTERIC COATED) ORAL DAILY
Refills: 0 | Status: DISCONTINUED | OUTPATIENT
Start: 2024-12-27 | End: 2025-01-07

## 2024-12-27 RX ORDER — NYSTATIN TOPICAL POWDER 100000 U/G
1 POWDER TOPICAL
Refills: 0 | Status: DISCONTINUED | OUTPATIENT
Start: 2024-12-27 | End: 2025-01-07

## 2024-12-27 RX ADMIN — CLOPIDOGREL BISULFATE 75 MILLIGRAM(S): 75 TABLET, FILM COATED ORAL at 11:55

## 2024-12-27 RX ADMIN — Medication 12.5 MILLIGRAM(S): at 19:17

## 2024-12-27 RX ADMIN — Medication 12.5 MILLIGRAM(S): at 05:39

## 2024-12-27 RX ADMIN — PANTOPRAZOLE 40 MILLIGRAM(S): 40 TABLET, DELAYED RELEASE ORAL at 05:39

## 2024-12-27 RX ADMIN — IPRATROPIUM BROMIDE AND ALBUTEROL SULFATE 3 MILLILITER(S): .5; 2.5 SOLUTION RESPIRATORY (INHALATION) at 07:49

## 2024-12-27 RX ADMIN — Medication 600 MILLIGRAM(S): at 05:39

## 2024-12-27 RX ADMIN — BUMETANIDE 1 MILLIGRAM(S): 2 TABLET ORAL at 08:29

## 2024-12-27 RX ADMIN — ESCITALOPRAM OXALATE 20 MILLIGRAM(S): 10 TABLET ORAL at 21:37

## 2024-12-27 RX ADMIN — IPRATROPIUM BROMIDE AND ALBUTEROL SULFATE 3 MILLILITER(S): .5; 2.5 SOLUTION RESPIRATORY (INHALATION) at 14:16

## 2024-12-27 RX ADMIN — NYSTATIN TOPICAL POWDER 1 APPLICATION(S): 100000 POWDER TOPICAL at 19:17

## 2024-12-27 RX ADMIN — Medication 600 MILLIGRAM(S): at 19:16

## 2024-12-27 RX ADMIN — AMIODARONE HYDROCHLORIDE 200 MILLIGRAM(S): 200 TABLET ORAL at 05:39

## 2024-12-27 RX ADMIN — IPRATROPIUM BROMIDE AND ALBUTEROL SULFATE 3 MILLILITER(S): .5; 2.5 SOLUTION RESPIRATORY (INHALATION) at 21:08

## 2024-12-27 RX ADMIN — Medication 1 APPLICATION(S): at 19:17

## 2024-12-27 RX ADMIN — INSULIN GLARGINE-YFGN 12 UNIT(S): 100 INJECTION, SOLUTION SUBCUTANEOUS at 21:37

## 2024-12-27 NOTE — PROGRESS NOTE ADULT - SUBJECTIVE AND OBJECTIVE BOX
CC: S/p TAVR     Today's Subjective & Objective Findings:  Patient seen and examined at bedside this AM.  Admits to sleeping well.   Again requesting PEG removal.   Discussed PEG removal for 6 weeks post insertion, will get GI to explain.     Denies headache, dizziness, visual changes, chest pain, SOB/DUFF, abdominal pain, nausea, vomiting, diarrhea, dysuria, numbness or tingling. LBM 12/26    Therapy-- Ambulating 50ft RW with min assist  SLP--Mod cog deficits, impulsivity  Barriers--left groin wound, cognitive deficits  Est 1/4    Vital Signs Last 24 Hrs  T(C): 36.9 (26 Dec 2024 19:38), Max: 36.9 (26 Dec 2024 19:38)  T(F): 98.5 (26 Dec 2024 19:38), Max: 98.5 (26 Dec 2024 19:38)  HR: 69 (27 Dec 2024 05:39) (69 - 79)  BP: 142/93 (27 Dec 2024 05:39) (139/71 - 148/83)  BP(mean): --  RR: 16 (27 Dec 2024 05:39) (16 - 16)  SpO2: 96% (27 Dec 2024 05:39) (96% - 96%)    PHYSICAL EXAM  Constitutional - No acute distress, Comfortable  Neck - Supple  Cardiovascular - Palpable peripheral pulses   Respiratory/Chest- decreased air entry b/l lower lobes and few rhonchi  Abdomen - Soft, Non-tender  Extremities - No cyanosis, No edema,     Neurologic Exam - Alert, Oriented  Sensory - Light touch sensation intact  Motor Function - Moves all extremities spontaneously  Skin -  Trach site 1x0.5cm with scan tan drainage with gauze and tape, Mid sternum incision well approximated, healed 5.5cm, MUNIRA, abd bruising, perineal bruising, L groin incision [id number] incision, wet to dry packing with gauze, CDI, L flank bruising, generalized ecchymosis and scabbing, b/l dry feet    LABS:                        9.1    7.33  )-----------( 193      ( 26 Dec 2024 06:10 )             29.2     12-26    140  |  103  |  16  ----------------------------<  115[H]  4.0   |  29  |  0.64    Ca    8.7      26 Dec 2024 06:10    TPro  6.8  /  Alb  2.3[L]  /  TBili  0.3  /  DBili  x   /  AST  16  /  ALT  19  /  AlkPhos  176[H]  12-26      Urinalysis Basic - ( 26 Dec 2024 06:10 )    Color: x / Appearance: x / SG: x / pH: x  Gluc: 115 mg/dL / Ketone: x  / Bili: x / Urobili: x   Blood: x / Protein: x / Nitrite: x   Leuk Esterase: x / RBC: x / WBC x   Sq Epi: x / Non Sq Epi: x / Bacteria: x    < from: Xray Cinesophagram Swallow Function w/ Contrast (12.23.24 @ 10:32) >  R SWAL FUNC JOSE RAFAEL VID CON STDY   ORDERED BY:  SOURAV SHEETS     PROCEDURE DATE:  12/23/2024      INTERPRETATION:  Swallowing function video. Patient has swallowing   difficulty. Barium refuses contrast.    There was some pooling in the valleculae and somewhat more pooling in the   piriforms. No penetration or aspiration was seen during the study.    IMPRESSION: As above.    --- End of Report ---      CAPILLARY BLOOD GLUCOSE  POCT Blood Glucose.: 123 mg/dL (27 Dec 2024 07:44)  POCT Blood Glucose.: 111 mg/dL (26 Dec 2024 21:38)  POCT Blood Glucose.: 118 mg/dL (26 Dec 2024 17:11)  POCT Blood Glucose.: 127 mg/dL (26 Dec 2024 11:55)      MEDICATIONS  (STANDING):  albuterol/ipratropium for Nebulization 3 milliLiter(s) Nebulizer every 6 hours  aMIOdarone    Tablet 200 milliGRAM(s) Oral daily  AQUAPHOR (petrolatum Ointment) 1 Application(s) Topical two times a day  buMETAnide 1 milliGRAM(s) Oral daily  clopidogrel Tablet 75 milliGRAM(s) Oral daily  dextrose 5%. 1000 milliLiter(s) (50 mL/Hr) IV Continuous <Continuous>  dextrose 5%. 1000 milliLiter(s) (100 mL/Hr) IV Continuous <Continuous>  dextrose 50% Injectable 25 Gram(s) IV Push once  dextrose 50% Injectable 12.5 Gram(s) IV Push once  dextrose 50% Injectable 25 Gram(s) IV Push once  enoxaparin Injectable 40 milliGRAM(s) SubCutaneous every 24 hours  escitalopram 20 milliGRAM(s) Oral at bedtime  glucagon  Injectable 1 milliGRAM(s) IntraMuscular once  guaiFENesin  milliGRAM(s) Oral every 12 hours  insulin glargine Injectable (LANTUS) 12 Unit(s) SubCutaneous at bedtime  insulin lispro (ADMELOG) corrective regimen sliding scale   SubCutaneous three times a day before meals  insulin lispro (ADMELOG) corrective regimen sliding scale   SubCutaneous at bedtime  metoprolol tartrate 12.5 milliGRAM(s) Oral two times a day  nystatin Powder 1 Application(s) Topical two times a day  pantoprazole    Tablet 40 milliGRAM(s) Oral before breakfast    MEDICATIONS  (PRN):  acetaminophen     Tablet .. 650 milliGRAM(s) Oral every 6 hours PRN Mild Pain (1 - 3)  dextrose Oral Gel 15 Gram(s) Oral once PRN Blood Glucose LESS THAN 70 milliGRAM(s)/deciliter  lidocaine 5% Ointment 1 Application(s) Topical three times a day PRN rib pain   CC: S/p TAVR     Today's Subjective & Objective Findings:  Patient seen and examined at bedside this AM.  Admits to sleeping well.   Again requesting PEG removal.   Discussed PEG removal for 6 weeks post insertion, will get GI to explain.     Denies headache, dizziness, visual changes, chest pain, SOB/DUFF, abdominal pain, nausea, vomiting, diarrhea, dysuria, numbness or tingling. LBM 12/26    Therapy-- Ambulating 50ft RW with min assist  SLP--Mod cog deficits, impulsivity  Barriers--left groin wound, cognitive deficits  Est 1/4    Vital Signs Last 24 Hrs  T(C): 36.9 (26 Dec 2024 19:38), Max: 36.9 (26 Dec 2024 19:38)  T(F): 98.5 (26 Dec 2024 19:38), Max: 98.5 (26 Dec 2024 19:38)  HR: 69 (27 Dec 2024 05:39) (69 - 79)  BP: 142/93 (27 Dec 2024 05:39) (139/71 - 148/83)  BP(mean): --  RR: 16 (27 Dec 2024 05:39) (16 - 16)  SpO2: 96% (27 Dec 2024 05:39) (96% - 96%)    PHYSICAL EXAM  Constitutional - No acute distress, Comfortable  Neck - Supple  Cardiovascular - Palpable peripheral pulses   Respiratory/Chest- decreased air entry b/l lower lobes and few rhonchi  Abdomen - Soft, Non-tender  Extremities - No cyanosis, No edema,     Neurologic Exam - Alert, Oriented  Sensory - Light touch sensation intact  Motor Function - Moves all extremities spontaneously  Skin -  Trach site 1x0.5cm with scan tan drainage with gauze and tape, Mid sternum incision well approximated, healed 5.5cm, MUNIRA, abd bruising, perineal bruising, L groin incision [id number] incision, wet to dry packing with gauze, CDI, L flank bruising, generalized ecchymosis and scabbing, b/l dry feet    LABS:                        9.1    7.33  )-----------( 193      ( 26 Dec 2024 06:10 )             29.2     12-26    140  |  103  |  16  ----------------------------<  115[H]  4.0   |  29  |  0.64    Ca    8.7      26 Dec 2024 06:10    TPro  6.8  /  Alb  2.3[L]  /  TBili  0.3  /  DBili  x   /  AST  16  /  ALT  19  /  AlkPhos  176[H]  12-26      Urinalysis Basic - ( 26 Dec 2024 06:10 )    Color: x / Appearance: x / SG: x / pH: x  Gluc: 115 mg/dL / Ketone: x  / Bili: x / Urobili: x   Blood: x / Protein: x / Nitrite: x   Leuk Esterase: x / RBC: x / WBC x   Sq Epi: x / Non Sq Epi: x / Bacteria: x    < from: Xray Cinesophagram Swallow Function w/ Contrast (12.23.24 @ 10:32) >  R SWAL FUNC JOSE RAFAEL VID CON STDY   ORDERED BY:  SOURAV SHEETS     PROCEDURE DATE:  12/23/2024      INTERPRETATION:  Swallowing function video. Patient has swallowing   difficulty. Barium refuses contrast.    There was some pooling in the valleculae and somewhat more pooling in the   piriforms. No penetration or aspiration was seen during the study.    IMPRESSION: As above.    --- End of Report ---      CAPILLARY BLOOD GLUCOSE  POCT Blood Glucose.: 123 mg/dL (27 Dec 2024 07:44)  POCT Blood Glucose.: 111 mg/dL (26 Dec 2024 21:38)  POCT Blood Glucose.: 118 mg/dL (26 Dec 2024 17:11)  POCT Blood Glucose.: 127 mg/dL (26 Dec 2024 11:55)        MEDICATIONS  (STANDING):  albuterol/ipratropium for Nebulization 3 milliLiter(s) Nebulizer every 6 hours  aMIOdarone    Tablet 200 milliGRAM(s) Oral daily  AQUAPHOR (petrolatum Ointment) 1 Application(s) Topical two times a day  buMETAnide 1 milliGRAM(s) Oral daily  clopidogrel Tablet 75 milliGRAM(s) Oral daily  dextrose 5%. 1000 milliLiter(s) (50 mL/Hr) IV Continuous <Continuous>  dextrose 5%. 1000 milliLiter(s) (100 mL/Hr) IV Continuous <Continuous>  dextrose 50% Injectable 25 Gram(s) IV Push once  dextrose 50% Injectable 12.5 Gram(s) IV Push once  dextrose 50% Injectable 25 Gram(s) IV Push once  enoxaparin Injectable 40 milliGRAM(s) SubCutaneous every 24 hours  escitalopram 20 milliGRAM(s) Oral at bedtime  glucagon  Injectable 1 milliGRAM(s) IntraMuscular once  guaiFENesin  milliGRAM(s) Oral every 12 hours  insulin glargine Injectable (LANTUS) 12 Unit(s) SubCutaneous at bedtime  insulin lispro (ADMELOG) corrective regimen sliding scale   SubCutaneous three times a day before meals  insulin lispro (ADMELOG) corrective regimen sliding scale   SubCutaneous at bedtime  metoprolol tartrate 12.5 milliGRAM(s) Oral two times a day  nystatin Powder 1 Application(s) Topical two times a day  pantoprazole    Tablet 40 milliGRAM(s) Oral before breakfast    MEDICATIONS  (PRN):  acetaminophen     Tablet .. 650 milliGRAM(s) Oral every 6 hours PRN Mild Pain (1 - 3)  dextrose Oral Gel 15 Gram(s) Oral once PRN Blood Glucose LESS THAN 70 milliGRAM(s)/deciliter  lidocaine 5% Ointment 1 Application(s) Topical three times a day PRN rib pain   CC: S/p TAVR     Today's Subjective & Objective Findings:  Patient seen and examined at bedside this AM.  Admits to sleeping well. Breathing normally on R/A   Again requesting PEG removal and reports mild irritation around PEG insertion site  Agreed to GI consult for review of PEG and determine PEG removal date     Denies headache, dizziness, visual changes, chest pain, SOB/DUFF, abdominal pain, nausea, vomiting, diarrhea, dysuria, numbness or tingling. LBM 12/26    Therapy-- Ambulating 50ft RW with min assist  SLP--Mod cog deficits, impulsivity  Barriers--left groin wound, cognitive deficits  Est 1/4    Vital Signs Last 24 Hrs  T(C): 36.9 (26 Dec 2024 19:38), Max: 36.9 (26 Dec 2024 19:38)  T(F): 98.5 (26 Dec 2024 19:38), Max: 98.5 (26 Dec 2024 19:38)  HR: 69 (27 Dec 2024 05:39) (69 - 79)  BP: 142/93 (27 Dec 2024 05:39) (139/71 - 148/83)  RR: 16 (27 Dec 2024 05:39) (16 - 16)  SpO2: 96% (27 Dec 2024 05:39) (96% - 96%)    PHYSICAL EXAM  Constitutional -Comfortable, cheerful  Neck - Supple  Cardiovascular - Palpable peripheral pulses   Respiratory/Chest- decreased air entry b/l lower lobes and few rhonchi  Abdomen - Soft, Non-tender, mild erythema around PEG   Extremities - No cyanosis, No edema,     Neurologic Exam - Alert, Oriented  Sensory - Light touch sensation intact  Motor Function - Moves all extremities spontaneously  Skin -  Trach site with healthy scab, open to air,  L groin wound- wet to dry packing with gauze, CDI, L flank bruising, generalized ecchymosis and scabbing, b/l dry feet    LABS:                        9.1    7.33  )-----------( 193      ( 26 Dec 2024 06:10 )             29.2     12-26    140  |  103  |  16  ----------------------------<  115[H]  4.0   |  29  |  0.64    Ca    8.7      26 Dec 2024 06:10    TPro  6.8  /  Alb  2.3[L]  /  TBili  0.3  /  DBili  x   /  AST  16  /  ALT  19  /  AlkPhos  176[H]  12-26      Urinalysis Basic - ( 26 Dec 2024 06:10 )    Color: x / Appearance: x / SG: x / pH: x  Gluc: 115 mg/dL / Ketone: x  / Bili: x / Urobili: x   Blood: x / Protein: x / Nitrite: x   Leuk Esterase: x / RBC: x / WBC x   Sq Epi: x / Non Sq Epi: x / Bacteria: x    < from: Xray Chest 1 View- PORTABLE-Urgent (Xray Chest 1 View- PORTABLE-Urgent .) (12.23.24 @ 11:01) >   XR CHEST PORTABLE URGENT 1V   ORDERED BY:  SOURAV SHEETS     PROCEDURE DATE:  12/23/2024      INTERPRETATION:  AP chest on December 23, 2024 at 10:51 AM. Patient has   wheezing.    Heart magnified by technique. TAVR aortic valve noted.    There is slight blunting right base laterally new since December 5, 2023.    IMPRESSION: Slight blunting of right base laterally new since prior.    CAPILLARY BLOOD GLUCOSE  POCT Blood Glucose.: 123 mg/dL (27 Dec 2024 07:44)  POCT Blood Glucose.: 111 mg/dL (26 Dec 2024 21:38)  POCT Blood Glucose.: 118 mg/dL (26 Dec 2024 17:11)  POCT Blood Glucose.: 127 mg/dL (26 Dec 2024 11:55)        MEDICATIONS  (STANDING):  albuterol/ipratropium for Nebulization 3 milliLiter(s) Nebulizer every 6 hours  aMIOdarone    Tablet 200 milliGRAM(s) Oral daily  AQUAPHOR (petrolatum Ointment) 1 Application(s) Topical two times a day  buMETAnide 1 milliGRAM(s) Oral daily  clopidogrel Tablet 75 milliGRAM(s) Oral daily  dextrose 5%. 1000 milliLiter(s) (50 mL/Hr) IV Continuous <Continuous>  dextrose 5%. 1000 milliLiter(s) (100 mL/Hr) IV Continuous <Continuous>  dextrose 50% Injectable 25 Gram(s) IV Push once  dextrose 50% Injectable 12.5 Gram(s) IV Push once  dextrose 50% Injectable 25 Gram(s) IV Push once  enoxaparin Injectable 40 milliGRAM(s) SubCutaneous every 24 hours  escitalopram 20 milliGRAM(s) Oral at bedtime  glucagon  Injectable 1 milliGRAM(s) IntraMuscular once  guaiFENesin  milliGRAM(s) Oral every 12 hours  insulin glargine Injectable (LANTUS) 12 Unit(s) SubCutaneous at bedtime  insulin lispro (ADMELOG) corrective regimen sliding scale   SubCutaneous three times a day before meals  insulin lispro (ADMELOG) corrective regimen sliding scale   SubCutaneous at bedtime  metoprolol tartrate 12.5 milliGRAM(s) Oral two times a day  nystatin Powder 1 Application(s) Topical two times a day  pantoprazole    Tablet 40 milliGRAM(s) Oral before breakfast    MEDICATIONS  (PRN):  acetaminophen     Tablet .. 650 milliGRAM(s) Oral every 6 hours PRN Mild Pain (1 - 3)  dextrose Oral Gel 15 Gram(s) Oral once PRN Blood Glucose LESS THAN 70 milliGRAM(s)/deciliter  lidocaine 5% Ointment 1 Application(s) Topical three times a day PRN rib pain

## 2024-12-27 NOTE — PROGRESS NOTE ADULT - NS ATTEND AMEND GEN_ALL_CORE FT
I have made amendments to the documentation where necessary. Additional comments: I have personally seen and examined the patient independently Medical records reviewed. I have made amendments to the documentation where necessary and adjusted the history, physical examination, and plan as documented by the NP.     Reports good night rest  Collateral hx obtained from daughter     Smoking hx and CPAP use prior to acute hosp care  Not using CPAP in hosp, breathing normal   Trach site healing   PEG insitu   Mild erythema around peg, placed 12/3, asking for peg removal  Feeding orally     Continue therapy  GI consult to review PEG   Leg groin wound --continue local wound care with wound care team  Treatment plan d/w daughter       Spent 53 mins, patient review, discussion of treatment plan, clinical/functional progress, liaison with family and care co ordination

## 2024-12-27 NOTE — PROGRESS NOTE ADULT - SUBJECTIVE AND OBJECTIVE BOX
CC: Patient is a 79y old  Female who presents with a chief complaint of Aortic Valve Disease s/p TAVR (27 Dec 2024 08:38)      Interval History:  Patient seen and examined at bedside.  No overnight events  No complaints this morning  Denies CP, SOB, abd pain, N/V.   Wants PEG tube removed    ALLERGIES:  penicillins (Hives)  Tetanus Immune Globulin (Unknown)  adhesives (Rash)  Typhoid Vaccines (Anaphylaxis)    MEDICATIONS  (STANDING):  albuterol/ipratropium for Nebulization 3 milliLiter(s) Nebulizer every 6 hours  aMIOdarone    Tablet 200 milliGRAM(s) Oral daily  AQUAPHOR (petrolatum Ointment) 1 Application(s) Topical two times a day  buMETAnide 1 milliGRAM(s) Oral daily  clopidogrel Tablet 75 milliGRAM(s) Oral daily  dextrose 5%. 1000 milliLiter(s) (100 mL/Hr) IV Continuous <Continuous>  dextrose 5%. 1000 milliLiter(s) (50 mL/Hr) IV Continuous <Continuous>  dextrose 50% Injectable 25 Gram(s) IV Push once  dextrose 50% Injectable 12.5 Gram(s) IV Push once  dextrose 50% Injectable 25 Gram(s) IV Push once  enoxaparin Injectable 40 milliGRAM(s) SubCutaneous every 24 hours  escitalopram 20 milliGRAM(s) Oral at bedtime  glucagon  Injectable 1 milliGRAM(s) IntraMuscular once  guaiFENesin  milliGRAM(s) Oral every 12 hours  insulin glargine Injectable (LANTUS) 12 Unit(s) SubCutaneous at bedtime  insulin lispro (ADMELOG) corrective regimen sliding scale   SubCutaneous three times a day before meals  insulin lispro (ADMELOG) corrective regimen sliding scale   SubCutaneous at bedtime  metoprolol tartrate 12.5 milliGRAM(s) Oral two times a day  nystatin Powder 1 Application(s) Topical two times a day  pantoprazole    Tablet 40 milliGRAM(s) Oral before breakfast    MEDICATIONS  (PRN):  acetaminophen     Tablet .. 650 milliGRAM(s) Oral every 6 hours PRN Mild Pain (1 - 3)  dextrose Oral Gel 15 Gram(s) Oral once PRN Blood Glucose LESS THAN 70 milliGRAM(s)/deciliter  lidocaine 5% Ointment 1 Application(s) Topical three times a day PRN rib pain    Vital Signs Last 24 Hrs  T(F): 98.4 (27 Dec 2024 08:28), Max: 98.5 (26 Dec 2024 19:38)  HR: 62 (27 Dec 2024 14:19) (61 - 79)  BP: 135/82 (27 Dec 2024 08:28) (135/82 - 148/83)  RR: 16 (27 Dec 2024 08:28) (16 - 16)  SpO2: 96% (27 Dec 2024 14:19) (96% - 97%)  I&O's Summary        PHYSICAL EXAM:  HEAD:  Atraumatic, Normocephalic  EYES: EOMI,  conjunctiva and sclera clear  ENMT: Moist mucous membranes  NECK: Supple, No JVD  NERVOUS SYSTEM:  Alert & Oriented   CHEST/LUNG: b/l lower lungs crackles  HEART: Regular rate and rhythm  ABDOMEN: Soft, Nontender, Nondistended; Bowel sounds present  EXTREMITIES: No clubbing, cyanosis, mild LE edema    LABS:                        9.1    7.33  )-----------( 193      ( 26 Dec 2024 06:10 )             29.2       12-26    140  |  103  |  16  ----------------------------<  115  4.0   |  29  |  0.64    Ca    8.7      26 Dec 2024 06:10    TPro  6.8  /  Alb  2.3  /  TBili  0.3  /  DBili  x   /  AST  16  /  ALT  19  /  AlkPhos  176  12-26                              POCT Blood Glucose.: 118 mg/dL (27 Dec 2024 11:54)  POCT Blood Glucose.: 123 mg/dL (27 Dec 2024 07:44)  POCT Blood Glucose.: 111 mg/dL (26 Dec 2024 21:38)  POCT Blood Glucose.: 118 mg/dL (26 Dec 2024 17:11)      Urinalysis Basic - ( 26 Dec 2024 06:10 )    Color: x / Appearance: x / SG: x / pH: x  Gluc: 115 mg/dL / Ketone: x  / Bili: x / Urobili: x   Blood: x / Protein: x / Nitrite: x   Leuk Esterase: x / RBC: x / WBC x   Sq Epi: x / Non Sq Epi: x / Bacteria: x        COVID-19 PCR: NotDetec (12-19-24 @ 17:29)      Care Discussed with Consultants/Other Providers: Yes. Rehab provider

## 2024-12-27 NOTE — PROGRESS NOTE ADULT - ASSESSMENT
79 year old, obese, female with PMH of DAYANA (on CPAP), aortic stenosis, HTN, HLD, current smoker, COPD, Type 2 Diabetes Mellitus, and non-alcoholic fatty liver disease; who presented to [facility] on 11/20 for a scheduled TAVR, and possible stent placement.  She reports several month history of DUFF while walking and climbing up stairs, fatigue and BL LE edema. On 11/20 she underwent TAVR, drainage of retroperitoneal hematoma and pericardial window with Dr. Garland and Noah. Intraoperatively, she had an episode of Vtach (on Amiodarone).      Her hospital course was complicated by the following: acute hypoxic respiratory failure (requiring BiPAP and eventual intubation on 11/25), hemorrhagic shock, AFIB w/ RVR, coffee ground emesis, Code Blue (s/p CPR resulting in rib fractures), AIDA, fever secondary to aspiration PNA, +pseudomonas in sputum, persistent leukocytosis and low grade fevers (on Vanco per ID), L lateral abdominal wall hematoma, distended gallbladder and gallbladder sludge, dysphagia (s/p trache and PEG placement on 12/3), decannulation (12/13), L groin delayed wound healing (required wound vac), and hyperglycemia.  Patient now admitted for a multidisciplinary rehab program. 12-19-24 @ 13:25        #Aortic stenosis S/p TAVR  - Comprehensive Rehab Program of PT/OT/SLP  - Plavix 75mg daily     #HTN  #AFIB w/ RVR  - Amiodarone 200mg daily   - Bumex 1mg daily   - Metoprolol 12.5mg BID   - No therapeutic AC as pt had mass tranfusion protocol at [facility]  - started on dvt ppx    #Type 2 Diabetes Mellitus  - A1c: 6.4% (Dec 2024)    - FS AC & HS  - Mod ISS  - Lantus 12U HS  - Monitor BGM  - Can be discharge on lantus. Pt to discontinue home glimepiride     #DAYANA  #COPD  - Duo-neb q 6hrs  - outpt pft studies and further evaluation    #Acute Hypoxic Respiratory Failures  - multifactorial, resolved  - decannulated 12/3  - PRN: Nebulizer QID   - Mucinex  - Chest PT    #Mood  - Escitalopram 20mg daily     #Dysphagia  - tolerating oral feeds,   - PEG to continue in situ and f/u GI outpatient  (placed 12/3)    #GI/Bowel Mgmt   - Pantoprazole  - Senna , Miralax     # GI ppx: Pantoprazole  # DVT: lovenox, Geovany

## 2024-12-27 NOTE — PROGRESS NOTE ADULT - ASSESSMENT
79 year old, obese, female with PMH of DAYANA (on CPAP), aortic stenosis, HTN, HLD, current smoker, COPD, Type 2 Diabetes Mellitus, and non-alcoholic fatty liver disease; who presented to [facility] on 11/20 for a scheduled TAVR, and possible stent placement.  She reports several month history of DUFF while walking and climbing up stairs, fatigue and BL LE edema. On 11/20 she underwent TAVR, drainage of retroperitoneal hematoma and pericardial window with Dr. Garland and Noah. Intraoperatively, she had an episode of Vtach (on Amiodarone).    Her hospital course was complicated by the following: acute hypoxic respiratory failure (requiring BiPAP and eventual intubation on 11/25), hemorrhagic shock, AFIB w/ RVR, coffee ground emesis, Code Blue (s/p CPR resulting in rib fractures), AIDA, fever secondary to aspiration PNA, +pseudomonas in sputum, persistent leukocytosis and low grade fevers (on Vanco per ID), L lateral abdominal wall hematoma, distended gallbladder and gallbladder sludge, dysphagia (s/p trache and PEG placement on 12/3), decannulation (12/13), L groin delayed wound healing (requiring wound vac), and hyperglycemia.  Patient now admitted for a multidisciplinary rehab program. 12-19-24 @ 13:25    * Left groin wound - wound recs appreciated   * Cognitive deficits - SLP following   * Requesting PEG removal - await GI recs     #Aortic valve disease S/p TAVR  - Gait Instability, ADL impairments and Functional impairments:   Continue Comprehensive Rehab Program of PT/OT/SLP  - 3 hours a day, 5 days a week  - ASA 81mg daily  - Plavix 75mg daily     REHAB ISSUES  Decreased ambulatory distance  Left groin wound  Impaired balance  Resp failure     #Acute Hypoxic Respiratory Failures  #Cough  - PRN: Nebulizer QID   - on 1L NC; wean as tolerated as patient does not wear o2 at home.  - CXR with R lung base atelectasis  - Mucinex BID  - Chest PT   - Incentive Spirometer     #HTN  #AFIB w/ RVR  - Amiodarone 200mg daily   - Bumex 1mg daily   - Metoprolol 12.5mg BID     #Type 2 Diabetes Mellitus  - A1c: 6.4% (Dec 2024)    - FS AC & HS  - Mod ISS  - Lantus 12U HS    #Mood  - Escitalopram 20mg daily     #Skin  - Skin -- Trach site 1x0.5cm with scan tan drainage with gauze and tape, Mid sternum incision well approximated, healed 5.5cm, MUNIRA, abd bruising, perineal bruising, L groin incision [id number] incision, wet to dry packing with gauze, CDI, L flank bruising, generalized ecchymosis and scabbing, b/l dry feet   - Aquaphor BID   - Pressure injury/Skin: OOB to Chair, PT/OT   - Left groin wound - wound care recs appreciated   -- Suggest moisten dressing before removal, then daily Normal Saline Cleanse of Left groin wound, pat thoroughly dry.  Apply Cavillon film barrier daily to intact periwound skin, allow to dry.  Gently pack area daily with saline moistened non-woven gauze (half of one piece), cover with folded dry non-woven gauze, cover with Tegaderm.      #Pain Mgmt   - PRN: Tylenol     #GI/Bowel Mgmt   - Pantoprazole  - Senna , Miralax    #/Bladder Mgmt --voiding     #FEN   - Diet - Regular with Thin Liquids   - Dysphagia  SLP - evaluation and treatment  - S/p MBS on 12/23 - passed, diet upgraded (as above)  - Dysphagia--tolerating oral feeds, PEG to continue in situ and f/u GI outpatient  (placed 12/3) - await GI recs    #Precautions / PROPHYLAXIS:   - Falls, Cardiac  - ortho: Weight bearing status: WBAT   - Lungs: Aspiration, Incentive Spirometer   - DVT: ASA 81mg daily & Plavix 75mg daily, TEDs   ----------------------------------------------  Next of Kin:   Daughter: Janie Drake: [phone]  ----------------------------------------------    IDT 12/24  Therapy-- Ambulating 50ft RW with min assist  SLP--Mod cog deficits, impulsivity  Barriers--left groin wound, cognitive deficits  Est 1/4  Dr. SMITH's Liaison with Family/Providers:    -----------------------------------------------  OUTPATIENT/FOLLOW UP:    Todd Devine MD  Vascular surgery, General surgery  [address]  [address]  [place] [address]  [phone]    Todd Odom MD  Cardiology, Interventional  [facility]   [address]  [address]  [facility], [address] [phone]  ----------------------------------------------- 79 year old, obese, female with PMH of DAYANA (on CPAP), aortic stenosis, HTN, HLD, current smoker, COPD, Type 2 Diabetes Mellitus, and non-alcoholic fatty liver disease; who presented to [facility] on 11/20 for a scheduled TAVR, and possible stent placement.  She reports several month history of DUFF while walking and climbing up stairs, fatigue and BL LE edema. On 11/20 she underwent TAVR, drainage of retroperitoneal hematoma and pericardial window with Dr. Garland and Noah. Intraoperatively, she had an episode of Vtach (on Amiodarone).    Her hospital course was complicated by the following: acute hypoxic respiratory failure (requiring BiPAP and eventual intubation on 11/25), hemorrhagic shock, AFIB w/ RVR, coffee ground emesis, Code Blue (s/p CPR resulting in rib fractures), AIDA, fever secondary to aspiration PNA, +pseudomonas in sputum, persistent leukocytosis and low grade fevers (on Vanco per ID), L lateral abdominal wall hematoma, distended gallbladder and gallbladder sludge, dysphagia (s/p trache and PEG placement on 12/3), decannulation (12/13), L groin delayed wound healing (requiring wound vac), and hyperglycemia.  Patient now admitted for a multidisciplinary rehab program. 12-19-24 @ 13:25    * Left groin wound - wound recs appreciated   * Cognitive deficits - SLP following   * Requesting PEG removal - await GI recs   * Gradual improvement with ambulatory distance, but still limited  * Collateral hx obtained from daughter    #Aortic valve disease S/p TAVR  - Gait Instability, ADL impairments and Functional impairments:   Continue Comprehensive Rehab Program of PT/OT/SLP  - 3 hours a day, 5 days a week  - ASA 81mg daily  - Plavix 75mg daily     REHAB ISSUES  Decreased ambulatory distance  Left groin wound  Impaired balance  Resp failure     #Acute Hypoxic Respiratory Failures  #Cough  - PRN: Nebulizer QID   - on 1L NC; wean as tolerated as patient does not wear o2 at home.  - CXR with R lung base atelectasis  - Mucinex BID  - Chest PT   - Incentive Spirometer     #HTN  #AFIB w/ RVR  - Amiodarone 200mg daily   - Bumex 1mg daily   - Metoprolol 12.5mg BID     #Type 2 Diabetes Mellitus  - A1c: 6.4% (Dec 2024)    - FS AC & HS  - Mod ISS  - Lantus 12U HS    #Mood  - Escitalopram 20mg daily     #Skin - Trach site with healthy scab, open to air,  L groin wound- wet to dry packing with gauze, CDI, L flank bruising, generalized ecchymosis and scabbing, b/l dry feet  - Aquaphor BID   - Pressure injury/Skin: OOB to Chair, PT/OT   - Left groin wound - wound care recs appreciated   -- Suggest moisten dressing before removal, then daily Normal Saline Cleanse of Left groin wound, pat thoroughly dry.  Apply Cavillon film barrier daily to intact periwound skin, allow to dry.  Gently pack area daily with saline moistened non-woven gauze (half of one piece), cover with folded dry non-woven gauze, cover with Tegaderm.      #Pain Mgmt   - PRN: Tylenol     #GI/Bowel Mgmt   - Pantoprazole  - Senna , Miralax    #/Bladder Mgmt --voiding     #FEN   - Diet - Regular with Thin Liquids   - Dysphagia  SLP - evaluation and treatment  - S/p MBS on 12/23 - passed, diet upgraded (as above)  - Dysphagia--tolerating oral feeds, PEG to continue in situ and f/u GI outpatient  (placed 12/3) - await GI recs    #Precautions / PROPHYLAXIS:   - Falls, Cardiac  - ortho: Weight bearing status: WBAT   - Lungs: Aspiration, Incentive Spirometer   - DVT: ASA 81mg daily & Plavix 75mg daily, TEDs   ----------------------------------------------  Next of Kin:   Daughter: Janie Drake: [phone]  ----------------------------------------------    IDT 12/24  Therapy-- Ambulating 50ft RW with min assist  SLP--Mod cog deficits, impulsivity  Barriers--left groin wound, cognitive deficits  Est 1/4    Dr. SMITH's Liaison with Family/Providers  12/27--I called and d/w daughter Ms Mendez angel RN at [facility],   I discussed patient's clinical and functional update----ambulatory distance, limited, trach site healing, wound care for left groin site, improved resp function  She reports that prior to acute care, patient had been living alone, smoker, using nightly CPAP,but currently not smoking, no longer using CPAP  Due family training 1/2, She is worried about d/c home if not functionally optimized, falls risk, further functional deterioration etc  Family is open to home dc if patient is functionally optimized or RACHEL if goals not being met  We agreed that therapy will continue for now, decision on d/c disposition will be made at family training on Tue 1/2  At present we are getting GI to review PEG, We will continue monitoring blunding of rt costophrenic angle and resolving ? atelectasis  She was happy with the update      OUTPATIENT/FOLLOW UP:    Todd Devine MD  Vascular surgery, General surgery  [address] [facility] [facility]  [address]  [place] [address]  [phone]    Todd Odom MD  Cardiology, Interventional  [facility]   [address]  [address]  [facility] [place], [address] [phone]  -----------------------------------------------

## 2024-12-27 NOTE — CHART NOTE - NSCHARTNOTEFT_GEN_A_CORE
NUTRITION Follow Up Note    Pt reports adequate appetite/intake at this time. No reported issues chewing/swallowing. Pt consuming 76 - 100% of meals provided. No N/V/D/C at this time. Blood glucose levels within range. Obtained pt preferences.     SOURCE: Patient [X]  Medical Record [X]  Nursing Staff [X]  Family Member []    DIET: Diet, Regular:   Consistent Carbohydrate {No Snacks}  DASH/TLC {Sodium & Cholesterol Restricted} (12-23-24 @ 10:40) [Active]          EDEMA: None Noted     LAST BM: 12/26     SKIN: Wound bundle see flow sheets for details. No Pressure Ulcers     WEIGHT TRENDS: 178lbs       PERTINENT MEDICATIONS: MEDICATIONS  (STANDING):  albuterol/ipratropium for Nebulization 3 milliLiter(s) Nebulizer every 6 hours  aMIOdarone    Tablet 200 milliGRAM(s) Oral daily  AQUAPHOR (petrolatum Ointment) 1 Application(s) Topical two times a day  buMETAnide 1 milliGRAM(s) Oral daily  clopidogrel Tablet 75 milliGRAM(s) Oral daily  dextrose 5%. 1000 milliLiter(s) (50 mL/Hr) IV Continuous <Continuous>  dextrose 5%. 1000 milliLiter(s) (100 mL/Hr) IV Continuous <Continuous>  dextrose 50% Injectable 25 Gram(s) IV Push once  dextrose 50% Injectable 12.5 Gram(s) IV Push once  dextrose 50% Injectable 25 Gram(s) IV Push once  enoxaparin Injectable 40 milliGRAM(s) SubCutaneous every 24 hours  escitalopram 20 milliGRAM(s) Oral at bedtime  glucagon  Injectable 1 milliGRAM(s) IntraMuscular once  guaiFENesin  milliGRAM(s) Oral every 12 hours  insulin glargine Injectable (LANTUS) 12 Unit(s) SubCutaneous at bedtime  insulin lispro (ADMELOG) corrective regimen sliding scale   SubCutaneous three times a day before meals  insulin lispro (ADMELOG) corrective regimen sliding scale   SubCutaneous at bedtime  metoprolol tartrate 12.5 milliGRAM(s) Oral two times a day  nystatin Powder 1 Application(s) Topical two times a day  pantoprazole    Tablet 40 milliGRAM(s) Oral before breakfast    MEDICATIONS  (PRN):  acetaminophen     Tablet .. 650 milliGRAM(s) Oral every 6 hours PRN Mild Pain (1 - 3)  dextrose Oral Gel 15 Gram(s) Oral once PRN Blood Glucose LESS THAN 70 milliGRAM(s)/deciliter  lidocaine 5% Ointment 1 Application(s) Topical three times a day PRN rib pain      PERTINENT LABS:  12-26 Na140 mmol/L Glu 115 mg/dL[H] K+ 4.0 mmol/L Cr  0.64 mg/dL BUN 16 mg/dL 12-26 Alb 2.3 g/dL[L]        ESTIMATED NEEDS:   [X] No Change Since Previous Assessment    PREVIOUS NUTRITION DIAGNOSIS:  Swallowing Difficulty  related to dysphagia s/p intubation  as evidenced by easy to chew with mild thick liquids  Pt will tolerate least restrictive diet in order to meet >75% estimated nutritional needs    NUTRITION DIAGNOSIS IS [X] Ongoing   [X] Resolved - Pt upgraded to regular thin liquids and regular diet texture         NEW NUTRITION DIAGNOSIS: [X] Not Applicable    INTERVENTIONS:   1. Continue with Current Diet; Defer texture/consistency to Speech Language Pathologist prn.   2. Continue Current Nutrition Care Regimen at This Time.   3. RD to remain available.     MONITORING & EVALUATION:   1. Weights   2. PO intakes   3. Skin integrity   4. Tolerance to diet prescription   5. Labs & POCT  6. Follow up (per protocol)    Registered Dietitian/Nutritionist Remains Available.  Micah Riggs RD    Contact: [phone] or via MS TEAMS

## 2024-12-28 LAB
GLUCOSE BLDC GLUCOMTR-MCNC: 115 MG/DL — HIGH (ref 70–99)
GLUCOSE BLDC GLUCOMTR-MCNC: 116 MG/DL — HIGH (ref 70–99)
GLUCOSE BLDC GLUCOMTR-MCNC: 127 MG/DL — HIGH (ref 70–99)
GLUCOSE BLDC GLUCOMTR-MCNC: 174 MG/DL — HIGH (ref 70–99)

## 2024-12-28 PROCEDURE — 99223 1ST HOSP IP/OBS HIGH 75: CPT

## 2024-12-28 PROCEDURE — 99232 SBSQ HOSP IP/OBS MODERATE 35: CPT

## 2024-12-28 RX ADMIN — INSULIN GLARGINE-YFGN 12 UNIT(S): 100 INJECTION, SOLUTION SUBCUTANEOUS at 21:04

## 2024-12-28 RX ADMIN — Medication 81 MILLIGRAM(S): at 11:24

## 2024-12-28 RX ADMIN — Medication 600 MILLIGRAM(S): at 05:42

## 2024-12-28 RX ADMIN — NYSTATIN TOPICAL POWDER 1 APPLICATION(S): 100000 POWDER TOPICAL at 05:44

## 2024-12-28 RX ADMIN — Medication 1 APPLICATION(S): at 05:44

## 2024-12-28 RX ADMIN — IPRATROPIUM BROMIDE AND ALBUTEROL SULFATE 3 MILLILITER(S): .5; 2.5 SOLUTION RESPIRATORY (INHALATION) at 20:02

## 2024-12-28 RX ADMIN — CLOPIDOGREL BISULFATE 75 MILLIGRAM(S): 75 TABLET, FILM COATED ORAL at 11:24

## 2024-12-28 RX ADMIN — IPRATROPIUM BROMIDE AND ALBUTEROL SULFATE 3 MILLILITER(S): .5; 2.5 SOLUTION RESPIRATORY (INHALATION) at 02:21

## 2024-12-28 RX ADMIN — Medication 1 APPLICATION(S): at 17:11

## 2024-12-28 RX ADMIN — NYSTATIN TOPICAL POWDER 1 APPLICATION(S): 100000 POWDER TOPICAL at 17:11

## 2024-12-28 RX ADMIN — ESCITALOPRAM OXALATE 20 MILLIGRAM(S): 10 TABLET ORAL at 21:04

## 2024-12-28 RX ADMIN — PANTOPRAZOLE 40 MILLIGRAM(S): 40 TABLET, DELAYED RELEASE ORAL at 05:42

## 2024-12-28 RX ADMIN — Medication 12.5 MILLIGRAM(S): at 17:11

## 2024-12-28 RX ADMIN — IPRATROPIUM BROMIDE AND ALBUTEROL SULFATE 3 MILLILITER(S): .5; 2.5 SOLUTION RESPIRATORY (INHALATION) at 14:41

## 2024-12-28 RX ADMIN — Medication 12.5 MILLIGRAM(S): at 05:42

## 2024-12-28 RX ADMIN — Medication 600 MILLIGRAM(S): at 17:11

## 2024-12-28 RX ADMIN — IPRATROPIUM BROMIDE AND ALBUTEROL SULFATE 3 MILLILITER(S): .5; 2.5 SOLUTION RESPIRATORY (INHALATION) at 07:49

## 2024-12-28 RX ADMIN — AMIODARONE HYDROCHLORIDE 200 MILLIGRAM(S): 200 TABLET ORAL at 05:42

## 2024-12-28 NOTE — PROGRESS NOTE ADULT - SUBJECTIVE AND OBJECTIVE BOX
Pt. seen and examined at bedside.  No overnight events.      REVIEW OF SYSTEMS  Constitutional - No fever,  No fatigue  Neurological - No headaches, No loss of strength  Musculoskeletal - No joint pain, No joint swelling, No muscle pain    VITALS  T(C): 36.9 (12-28-24 @ 07:55), Max: 37 (12-27-24 @ 19:34)  HR: 62 (12-28-24 @ 07:55) (62 - 86)  BP: 148/84 (12-28-24 @ 07:55) (123/71 - 149/73)  RR: 16 (12-28-24 @ 07:55) (16 - 16)  SpO2: 100% (12-28-24 @ 07:55) (94% - 100%)  Wt(kg): --       MEDICATIONS   acetaminophen     Tablet .. 650 milliGRAM(s) every 6 hours PRN  albuterol/ipratropium for Nebulization 3 milliLiter(s) every 6 hours  aMIOdarone    Tablet 200 milliGRAM(s) daily  AQUAPHOR (petrolatum Ointment) 1 Application(s) two times a day  aspirin enteric coated 81 milliGRAM(s) daily  buMETAnide 1 milliGRAM(s) daily  clopidogrel Tablet 75 milliGRAM(s) daily  dextrose 5%. 1000 milliLiter(s) <Continuous>  dextrose 5%. 1000 milliLiter(s) <Continuous>  dextrose 50% Injectable 25 Gram(s) once  dextrose 50% Injectable 12.5 Gram(s) once  dextrose 50% Injectable 25 Gram(s) once  dextrose Oral Gel 15 Gram(s) once PRN  escitalopram 20 milliGRAM(s) at bedtime  glucagon  Injectable 1 milliGRAM(s) once  guaiFENesin  milliGRAM(s) every 12 hours  insulin glargine Injectable (LANTUS) 12 Unit(s) at bedtime  insulin lispro (ADMELOG) corrective regimen sliding scale   three times a day before meals  insulin lispro (ADMELOG) corrective regimen sliding scale   at bedtime  lidocaine 5% Ointment 1 Application(s) three times a day PRN  metoprolol tartrate 12.5 milliGRAM(s) two times a day  nystatin Powder 1 Application(s) two times a day  pantoprazole    Tablet 40 milliGRAM(s) before breakfast      RECENT LABS/IMAGING                      POCT Blood Glucose.: 127 mg/dL (12-28-24 @ 07:53)  POCT Blood Glucose.: 133 mg/dL (12-27-24 @ 21:36)  POCT Blood Glucose.: 137 mg/dL (12-27-24 @ 16:21)  POCT Blood Glucose.: 118 mg/dL (12-27-24 @ 11:54)    ---------  PHYSICAL EXAM  Constitutional - NAD, Comfortable  Pulm - Breathing comfortably, No wheezing  Abd - Obese, soft, NTND, + G - TUBE  Extremities - No edema, No calf tenderness  Neurologic Exam -                    Cognitive - Awake, Alert     Communication - Fluent     Motor - No focal deficits     Sensory - Intact to LT  Psychiatric - Mood WNL, Affect WNL    ASSESSMENT/PLAN  79y Female with functional deficits s/p TAVR.  Continue current medical management  Pain - Tylenol PRN; Lido ointment  DVT PPX - aspirin enteric coated 81 milliGRAM(s) daily  Active issues - none  Continue 3hrs a day of comprehensive rehab program.

## 2024-12-28 NOTE — CONSULT NOTE ADULT - PROBLEM SELECTOR RECOMMENDATION 9
- Peg placed 12/03.   - Follow up appointment as outpatient to remove PEG after 1/07 (5 weeks).  - If still admitted, may re- assess removal of PEG after 12/31 (week 4)   - Hold Plavix day before peg removal

## 2024-12-28 NOTE — CONSULT NOTE ADULT - NS ATTEND AMEND GEN_ALL_CORE FT
Agree with assessment and plan as above.    Patient with PEG placement on 12/3. GI consulted for evaluation of PEG removal. Recommend waiting at least 4 weeks prior to removal to ensure maturity of tract and avoid complications. If patient still admitted next week can re-evaluate for removal. Patient also on plavix which will need to be held day of removal.     >75 minutes was spent on direct patient care

## 2024-12-28 NOTE — CONSULT NOTE ADULT - ASSESSMENT
79 year old, obese, female with PMH of DAYANA (on CPAP), aortic stenosis, HTN, HLD, current smoker, COPD, Type 2 Diabetes Mellitus, and non-alcoholic fatty liver disease    Currently undergoing rehab, decannulated, tolerating po intake, peg is not being used. Patient educated on peg removal time frame, peg placed 12/03. Patient verbalized understanding.

## 2024-12-29 LAB
GLUCOSE BLDC GLUCOMTR-MCNC: 101 MG/DL — HIGH (ref 70–99)
GLUCOSE BLDC GLUCOMTR-MCNC: 114 MG/DL — HIGH (ref 70–99)
GLUCOSE BLDC GLUCOMTR-MCNC: 127 MG/DL — HIGH (ref 70–99)
GLUCOSE BLDC GLUCOMTR-MCNC: 88 MG/DL — SIGNIFICANT CHANGE UP (ref 70–99)

## 2024-12-29 PROCEDURE — 99232 SBSQ HOSP IP/OBS MODERATE 35: CPT

## 2024-12-29 RX ADMIN — IPRATROPIUM BROMIDE AND ALBUTEROL SULFATE 3 MILLILITER(S): .5; 2.5 SOLUTION RESPIRATORY (INHALATION) at 19:45

## 2024-12-29 RX ADMIN — IPRATROPIUM BROMIDE AND ALBUTEROL SULFATE 3 MILLILITER(S): .5; 2.5 SOLUTION RESPIRATORY (INHALATION) at 02:09

## 2024-12-29 RX ADMIN — Medication 1 APPLICATION(S): at 18:14

## 2024-12-29 RX ADMIN — Medication 600 MILLIGRAM(S): at 05:33

## 2024-12-29 RX ADMIN — ESCITALOPRAM OXALATE 20 MILLIGRAM(S): 10 TABLET ORAL at 21:13

## 2024-12-29 RX ADMIN — PANTOPRAZOLE 40 MILLIGRAM(S): 40 TABLET, DELAYED RELEASE ORAL at 05:33

## 2024-12-29 RX ADMIN — AMIODARONE HYDROCHLORIDE 200 MILLIGRAM(S): 200 TABLET ORAL at 05:33

## 2024-12-29 RX ADMIN — Medication 600 MILLIGRAM(S): at 18:13

## 2024-12-29 RX ADMIN — INSULIN GLARGINE-YFGN 12 UNIT(S): 100 INJECTION, SOLUTION SUBCUTANEOUS at 21:13

## 2024-12-29 RX ADMIN — Medication 12.5 MILLIGRAM(S): at 18:13

## 2024-12-29 RX ADMIN — NYSTATIN TOPICAL POWDER 1 APPLICATION(S): 100000 POWDER TOPICAL at 05:34

## 2024-12-29 RX ADMIN — Medication 81 MILLIGRAM(S): at 12:17

## 2024-12-29 RX ADMIN — CLOPIDOGREL BISULFATE 75 MILLIGRAM(S): 75 TABLET, FILM COATED ORAL at 12:17

## 2024-12-29 RX ADMIN — Medication 12.5 MILLIGRAM(S): at 05:33

## 2024-12-29 RX ADMIN — NYSTATIN TOPICAL POWDER 1 APPLICATION(S): 100000 POWDER TOPICAL at 18:14

## 2024-12-29 NOTE — PROGRESS NOTE ADULT - SUBJECTIVE AND OBJECTIVE BOX
Pt. seen and examined at bedside.  No overnight events.      REVIEW OF SYSTEMS  Constitutional - No fever,  No fatigue  Neurological - No headaches, No loss of strength  Musculoskeletal - No joint pain, No joint swelling, No muscle pain    VITALS  T(C): 36.9 (12-28-24 @ 20:22), Max: 36.9 (12-28-24 @ 20:22)  HR: 69 (12-29-24 @ 05:33) (61 - 81)  BP: 136/78 (12-29-24 @ 05:33) (129/74 - 136/78)  RR: 16 (12-29-24 @ 05:33) (16 - 16)  SpO2: 94% (12-29-24 @ 05:33) (94% - 100%)  Wt(kg): --       MEDICATIONS   acetaminophen     Tablet .. 650 milliGRAM(s) every 6 hours PRN  albuterol/ipratropium for Nebulization 3 milliLiter(s) every 6 hours  aMIOdarone    Tablet 200 milliGRAM(s) daily  AQUAPHOR (petrolatum Ointment) 1 Application(s) two times a day  aspirin enteric coated 81 milliGRAM(s) daily  buMETAnide 1 milliGRAM(s) daily  clopidogrel Tablet 75 milliGRAM(s) daily  dextrose 5%. 1000 milliLiter(s) <Continuous>  dextrose 5%. 1000 milliLiter(s) <Continuous>  dextrose 50% Injectable 25 Gram(s) once  dextrose 50% Injectable 12.5 Gram(s) once  dextrose 50% Injectable 25 Gram(s) once  dextrose Oral Gel 15 Gram(s) once PRN  escitalopram 20 milliGRAM(s) at bedtime  glucagon  Injectable 1 milliGRAM(s) once  guaiFENesin  milliGRAM(s) every 12 hours  insulin glargine Injectable (LANTUS) 12 Unit(s) at bedtime  insulin lispro (ADMELOG) corrective regimen sliding scale   three times a day before meals  insulin lispro (ADMELOG) corrective regimen sliding scale   at bedtime  lidocaine 5% Ointment 1 Application(s) three times a day PRN  metoprolol tartrate 12.5 milliGRAM(s) two times a day  nystatin Powder 1 Application(s) two times a day  pantoprazole    Tablet 40 milliGRAM(s) before breakfast      RECENT LABS/IMAGING                      POCT Blood Glucose.: 114 mg/dL (12-29-24 @ 08:09)  POCT Blood Glucose.: 174 mg/dL (12-28-24 @ 21:03)  POCT Blood Glucose.: 116 mg/dL (12-28-24 @ 16:39)  POCT Blood Glucose.: 115 mg/dL (12-28-24 @ 11:22)    ---------  PHYSICAL EXAM  Constitutional - NAD, Comfortable  Pulm - Breathing comfortably, No wheezing  Abd - Soft, NTND, + G-tube  Extremities - No edema, No calf tenderness  Neurologic Exam -                    Cognitive - Awake, Alert     Communication - Fluent     Motor - No focal deficits     Sensory - Intact to LT  Psychiatric - Mood WNL, Affect WNL    ASSESSMENT/PLAN  79y Female with functional deficits s/p TAVR.    Continue current medical management  Pain - Tylenol PRN  DVT PPX - aspirin enteric coated 81 milliGRAM(s) daily  Active issues - none  Continue 3hrs a day of comprehensive rehab program.

## 2024-12-29 NOTE — PROGRESS NOTE ADULT - ASSESSMENT
79 year old, obese, female with PMH of DAYANA (on CPAP), aortic stenosis, HTN, HLD, current smoker, COPD, Type 2 Diabetes Mellitus, and non-alcoholic fatty liver disease; who presented to [facility] on 11/20 for a scheduled TAVR, and possible stent placement.  She reports several month history of DUFF while walking and climbing up stairs, fatigue and BL LE edema. On 11/20 she underwent TAVR, drainage of retroperitoneal hematoma and pericardial window with Dr. Garland and Noah. Intraoperatively, she had an episode of Vtach (on Amiodarone).      Her hospital course was complicated by the following: acute hypoxic respiratory failure (requiring BiPAP and eventual intubation on 11/25), hemorrhagic shock, AFIB w/ RVR, coffee ground emesis, Code Blue (s/p CPR resulting in rib fractures), AIDA, fever secondary to aspiration PNA, +pseudomonas in sputum, persistent leukocytosis and low grade fevers (on Vanco per ID), L lateral abdominal wall hematoma, distended gallbladder and gallbladder sludge, dysphagia (s/p trache and PEG placement on 12/3), decannulation (12/13), L groin delayed wound healing (required wound vac), and hyperglycemia.  Patient now admitted for a multidisciplinary rehab program. 12-19-24 @ 13:25        #Aortic stenosis S/p TAVR  - Comprehensive Rehab Program of PT/OT/SLP  - Plavix 75mg daily     #HTN  #AFIB w/ RVR  - Amiodarone 200mg daily   - pt has been refusing Bumex over the last few days. She was explained the importance of diuretics  but she is adamant about not taking it due to constant urination. No signs of heart failure clinically. Will encourage to resume Bumex  - Metoprolol 12.5mg BID   - No therapeutic AC as pt had mass tranfusion protocol at [place]  - started on dvt ppx    #Type 2 Diabetes Mellitus  - A1c: 6.4% (Dec 2024)    - FS AC & HS  - Mod ISS  - Lantus 12U HS  - Monitor BGM  - Can be discharged on lantus. Pt to discontinue home glimepiride     #DAYANA  #COPD  - Duo-neb q 6hrs  - outpt pft studies and further evaluation    #Acute Hypoxic Respiratory Failures  - multifactorial, resolved  - decannulated 12/3  - PRN: Nebulizer QID   - Mucinex  - Chest PT    #Mood  - Escitalopram 20mg daily     #Dysphagia  - tolerating oral feeds,   - PEG to continue in situ and f/u GI outpatient  (placed 12/3)    #GI/Bowel Mgmt   - Pantoprazole  - Senna , Miralax     # GI ppx: Pantoprazole  # DVT: lovenox TEDs

## 2024-12-29 NOTE — PROGRESS NOTE ADULT - SUBJECTIVE AND OBJECTIVE BOX
CC: Patient is a 79y old  Female who presents with a chief complaint of Aortic Valve Disease s/p TAVR (29 Dec 2024 09:53)      Interval History:  Patient seen and examined at bedside.  No overnight events  No complaints this morning  Denies CP, SOB, abd pain, N/V    ALLERGIES:  penicillins (Hives)  Tetanus Immune Globulin (Unknown)  adhesives (Rash)  Typhoid Vaccines (Anaphylaxis)    MEDICATIONS  (STANDING):  albuterol/ipratropium for Nebulization 3 milliLiter(s) Nebulizer every 6 hours  aMIOdarone    Tablet 200 milliGRAM(s) Oral daily  AQUAPHOR (petrolatum Ointment) 1 Application(s) Topical two times a day  aspirin enteric coated 81 milliGRAM(s) Oral daily  buMETAnide 1 milliGRAM(s) Oral daily  clopidogrel Tablet 75 milliGRAM(s) Oral daily  dextrose 5%. 1000 milliLiter(s) (50 mL/Hr) IV Continuous <Continuous>  dextrose 5%. 1000 milliLiter(s) (100 mL/Hr) IV Continuous <Continuous>  dextrose 50% Injectable 25 Gram(s) IV Push once  dextrose 50% Injectable 12.5 Gram(s) IV Push once  dextrose 50% Injectable 25 Gram(s) IV Push once  escitalopram 20 milliGRAM(s) Oral at bedtime  glucagon  Injectable 1 milliGRAM(s) IntraMuscular once  guaiFENesin  milliGRAM(s) Oral every 12 hours  insulin glargine Injectable (LANTUS) 12 Unit(s) SubCutaneous at bedtime  insulin lispro (ADMELOG) corrective regimen sliding scale   SubCutaneous three times a day before meals  insulin lispro (ADMELOG) corrective regimen sliding scale   SubCutaneous at bedtime  metoprolol tartrate 12.5 milliGRAM(s) Oral two times a day  nystatin Powder 1 Application(s) Topical two times a day  pantoprazole    Tablet 40 milliGRAM(s) Oral before breakfast    MEDICATIONS  (PRN):  acetaminophen     Tablet .. 650 milliGRAM(s) Oral every 6 hours PRN Mild Pain (1 - 3)  dextrose Oral Gel 15 Gram(s) Oral once PRN Blood Glucose LESS THAN 70 milliGRAM(s)/deciliter  lidocaine 5% Ointment 1 Application(s) Topical three times a day PRN rib pain    Vital Signs Last 24 Hrs  T(F): 98.4 (28 Dec 2024 20:22), Max: 98.4 (28 Dec 2024 20:22)  HR: 69 (29 Dec 2024 05:33) (61 - 81)  BP: 136/78 (29 Dec 2024 05:33) (129/74 - 136/78)  RR: 16 (29 Dec 2024 05:33) (16 - 16)  SpO2: 94% (29 Dec 2024 05:33) (94% - 100%)  I&O's Summary        PHYSICAL EXAM:  HEAD:  Atraumatic, Normocephalic  EYES: EOMI,  conjunctiva and sclera clear  ENMT: Moist mucous membranes  NECK: Supple, No JVD  NERVOUS SYSTEM:  Alert & Oriented   CHEST/LUNG: b/l lower lungs crackles  HEART: Regular rate and rhythm  ABDOMEN: Soft, Nontender, Nondistended; Bowel sounds present  EXTREMITIES: No clubbing, cyanosis, no LE edema  LABS:                                      POCT Blood Glucose.: 88 mg/dL (29 Dec 2024 11:48)  POCT Blood Glucose.: 114 mg/dL (29 Dec 2024 08:09)  POCT Blood Glucose.: 174 mg/dL (28 Dec 2024 21:03)  POCT Blood Glucose.: 116 mg/dL (28 Dec 2024 16:39)          COVID-19 PCR: NotDetec (12-19-24 @ 17:29)      Care Discussed with Consultants/Other Providers: Yes. rehab provider

## 2024-12-30 LAB
ALBUMIN SERPL ELPH-MCNC: 2.5 G/DL — LOW (ref 3.3–5)
ALP SERPL-CCNC: 160 U/L — HIGH (ref 40–120)
ALT FLD-CCNC: 23 U/L — SIGNIFICANT CHANGE UP (ref 10–45)
ANION GAP SERPL CALC-SCNC: 10 MMOL/L — SIGNIFICANT CHANGE UP (ref 5–17)
ANISOCYTOSIS BLD QL: SLIGHT — SIGNIFICANT CHANGE UP
AST SERPL-CCNC: 22 U/L — SIGNIFICANT CHANGE UP (ref 10–40)
BASOPHILS # BLD AUTO: 0 K/UL — SIGNIFICANT CHANGE UP (ref 0–0.2)
BASOPHILS NFR BLD AUTO: 0 % — SIGNIFICANT CHANGE UP (ref 0–2)
BILIRUB SERPL-MCNC: 0.4 MG/DL — SIGNIFICANT CHANGE UP (ref 0.2–1.2)
BUN SERPL-MCNC: 14 MG/DL — SIGNIFICANT CHANGE UP (ref 7–23)
CALCIUM SERPL-MCNC: 9.2 MG/DL — SIGNIFICANT CHANGE UP (ref 8.4–10.5)
CHLORIDE SERPL-SCNC: 102 MMOL/L — SIGNIFICANT CHANGE UP (ref 96–108)
CO2 SERPL-SCNC: 25 MMOL/L — SIGNIFICANT CHANGE UP (ref 22–31)
CREAT SERPL-MCNC: 0.52 MG/DL — SIGNIFICANT CHANGE UP (ref 0.5–1.3)
EGFR: 95 ML/MIN/1.73M2 — SIGNIFICANT CHANGE UP
EOSINOPHIL # BLD AUTO: 0 K/UL — SIGNIFICANT CHANGE UP (ref 0–0.5)
EOSINOPHIL NFR BLD AUTO: 0 % — SIGNIFICANT CHANGE UP (ref 0–6)
GLUCOSE BLDC GLUCOMTR-MCNC: 110 MG/DL — HIGH (ref 70–99)
GLUCOSE BLDC GLUCOMTR-MCNC: 112 MG/DL — HIGH (ref 70–99)
GLUCOSE BLDC GLUCOMTR-MCNC: 128 MG/DL — HIGH (ref 70–99)
GLUCOSE BLDC GLUCOMTR-MCNC: 129 MG/DL — HIGH (ref 70–99)
GLUCOSE SERPL-MCNC: 96 MG/DL — SIGNIFICANT CHANGE UP (ref 70–99)
HCT VFR BLD CALC: 31.7 % — LOW (ref 34.5–45)
HGB BLD-MCNC: 10.2 G/DL — LOW (ref 11.5–15.5)
HYPOCHROMIA BLD QL: SLIGHT — SIGNIFICANT CHANGE UP
LYMPHOCYTES # BLD AUTO: 1.03 K/UL — SIGNIFICANT CHANGE UP (ref 1–3.3)
LYMPHOCYTES # BLD AUTO: 14 % — SIGNIFICANT CHANGE UP (ref 13–44)
MANUAL SMEAR VERIFICATION: SIGNIFICANT CHANGE UP
MCHC RBC-ENTMCNC: 31.9 PG — SIGNIFICANT CHANGE UP (ref 27–34)
MCHC RBC-ENTMCNC: 32.2 G/DL — SIGNIFICANT CHANGE UP (ref 32–36)
MCV RBC AUTO: 99.1 FL — SIGNIFICANT CHANGE UP (ref 80–100)
MONOCYTES # BLD AUTO: 0.66 K/UL — SIGNIFICANT CHANGE UP (ref 0–0.9)
MONOCYTES NFR BLD AUTO: 9 % — SIGNIFICANT CHANGE UP (ref 2–14)
MYELOCYTES NFR BLD: 1 % — HIGH (ref 0–0)
NEUTROPHILS # BLD AUTO: 5.6 K/UL — SIGNIFICANT CHANGE UP (ref 1.8–7.4)
NEUTROPHILS NFR BLD AUTO: 76 % — SIGNIFICANT CHANGE UP (ref 43–77)
NRBC # BLD: 0 /100 WBCS — SIGNIFICANT CHANGE UP (ref 0–0)
PLAT MORPH BLD: NORMAL — SIGNIFICANT CHANGE UP
PLATELET # BLD AUTO: 199 K/UL — SIGNIFICANT CHANGE UP (ref 150–400)
POTASSIUM SERPL-MCNC: 4.5 MMOL/L — SIGNIFICANT CHANGE UP (ref 3.5–5.3)
POTASSIUM SERPL-SCNC: 4.5 MMOL/L — SIGNIFICANT CHANGE UP (ref 3.5–5.3)
PROT SERPL-MCNC: 7.5 G/DL — SIGNIFICANT CHANGE UP (ref 6–8.3)
RBC # BLD: 3.2 M/UL — LOW (ref 3.8–5.2)
RBC # FLD: 18.8 % — HIGH (ref 10.3–14.5)
RBC BLD AUTO: ABNORMAL
SODIUM SERPL-SCNC: 137 MMOL/L — SIGNIFICANT CHANGE UP (ref 135–145)
WBC # BLD: 7.37 K/UL — SIGNIFICANT CHANGE UP (ref 3.8–10.5)
WBC # FLD AUTO: 7.37 K/UL — SIGNIFICANT CHANGE UP (ref 3.8–10.5)

## 2024-12-30 PROCEDURE — 99232 SBSQ HOSP IP/OBS MODERATE 35: CPT

## 2024-12-30 RX ORDER — LINAGLIPTIN 5 MG/1
5 TABLET, FILM COATED ORAL DAILY
Refills: 0 | Status: DISCONTINUED | OUTPATIENT
Start: 2024-12-30 | End: 2025-01-07

## 2024-12-30 RX ORDER — METFORMIN 850 MG/1
500 TABLET ORAL
Refills: 0 | Status: DISCONTINUED | OUTPATIENT
Start: 2024-12-30 | End: 2025-01-07

## 2024-12-30 RX ORDER — GUAIFENESIN 100 MG/5ML
100 SYRUP ORAL EVERY 6 HOURS
Refills: 0 | Status: DISCONTINUED | OUTPATIENT
Start: 2024-12-30 | End: 2025-01-07

## 2024-12-30 RX ORDER — BENZONATATE 100 MG
100 CAPSULE ORAL THREE TIMES A DAY
Refills: 0 | Status: DISCONTINUED | OUTPATIENT
Start: 2024-12-30 | End: 2025-01-07

## 2024-12-30 RX ADMIN — Medication 12.5 MILLIGRAM(S): at 05:10

## 2024-12-30 RX ADMIN — PANTOPRAZOLE 40 MILLIGRAM(S): 40 TABLET, DELAYED RELEASE ORAL at 05:10

## 2024-12-30 RX ADMIN — AMIODARONE HYDROCHLORIDE 200 MILLIGRAM(S): 200 TABLET ORAL at 05:10

## 2024-12-30 RX ADMIN — IPRATROPIUM BROMIDE AND ALBUTEROL SULFATE 3 MILLILITER(S): .5; 2.5 SOLUTION RESPIRATORY (INHALATION) at 02:13

## 2024-12-30 RX ADMIN — Medication 600 MILLIGRAM(S): at 05:10

## 2024-12-30 RX ADMIN — NYSTATIN TOPICAL POWDER 1 APPLICATION(S): 100000 POWDER TOPICAL at 05:10

## 2024-12-30 RX ADMIN — BUMETANIDE 1 MILLIGRAM(S): 2 TABLET ORAL at 05:10

## 2024-12-30 RX ADMIN — CLOPIDOGREL BISULFATE 75 MILLIGRAM(S): 75 TABLET, FILM COATED ORAL at 12:05

## 2024-12-30 RX ADMIN — Medication 100 MILLIGRAM(S): at 21:58

## 2024-12-30 RX ADMIN — METFORMIN 500 MILLIGRAM(S): 850 TABLET ORAL at 16:59

## 2024-12-30 RX ADMIN — Medication 600 MILLIGRAM(S): at 17:00

## 2024-12-30 RX ADMIN — LINAGLIPTIN 5 MILLIGRAM(S): 5 TABLET, FILM COATED ORAL at 14:12

## 2024-12-30 RX ADMIN — IPRATROPIUM BROMIDE AND ALBUTEROL SULFATE 3 MILLILITER(S): .5; 2.5 SOLUTION RESPIRATORY (INHALATION) at 15:01

## 2024-12-30 RX ADMIN — ESCITALOPRAM OXALATE 20 MILLIGRAM(S): 10 TABLET ORAL at 21:58

## 2024-12-30 RX ADMIN — NYSTATIN TOPICAL POWDER 1 APPLICATION(S): 100000 POWDER TOPICAL at 18:46

## 2024-12-30 RX ADMIN — Medication 1 APPLICATION(S): at 05:10

## 2024-12-30 RX ADMIN — Medication 12.5 MILLIGRAM(S): at 17:00

## 2024-12-30 RX ADMIN — IPRATROPIUM BROMIDE AND ALBUTEROL SULFATE 3 MILLILITER(S): .5; 2.5 SOLUTION RESPIRATORY (INHALATION) at 19:58

## 2024-12-30 RX ADMIN — Medication 81 MILLIGRAM(S): at 12:04

## 2024-12-30 NOTE — PROGRESS NOTE ADULT - SUBJECTIVE AND OBJECTIVE BOX
CC: Patient is a 79y old  Female who presents with a chief complaint of Aortic Valve Disease s/p TAVR (30 Dec 2024 11:02)      Interval History:  Patient seen and examined at bedside.  No overnight events  No complaints this morning    ALLERGIES:  penicillins (Hives)  Tetanus Immune Globulin (Unknown)  adhesives (Rash)  Typhoid Vaccines (Anaphylaxis)    MEDICATIONS  (STANDING):  albuterol/ipratropium for Nebulization 3 milliLiter(s) Nebulizer every 6 hours  aMIOdarone    Tablet 200 milliGRAM(s) Oral daily  AQUAPHOR (petrolatum Ointment) 1 Application(s) Topical two times a day  aspirin enteric coated 81 milliGRAM(s) Oral daily  buMETAnide 1 milliGRAM(s) Oral daily  clopidogrel Tablet 75 milliGRAM(s) Oral daily  dextrose 5%. 1000 milliLiter(s) (100 mL/Hr) IV Continuous <Continuous>  dextrose 5%. 1000 milliLiter(s) (50 mL/Hr) IV Continuous <Continuous>  dextrose 50% Injectable 25 Gram(s) IV Push once  dextrose 50% Injectable 12.5 Gram(s) IV Push once  dextrose 50% Injectable 25 Gram(s) IV Push once  escitalopram 20 milliGRAM(s) Oral at bedtime  glucagon  Injectable 1 milliGRAM(s) IntraMuscular once  guaiFENesin  milliGRAM(s) Oral every 12 hours  insulin lispro (ADMELOG) corrective regimen sliding scale   SubCutaneous three times a day before meals  insulin lispro (ADMELOG) corrective regimen sliding scale   SubCutaneous at bedtime  linagliptin 5 milliGRAM(s) Oral daily  metFORMIN 500 milliGRAM(s) Oral two times a day with meals  metoprolol tartrate 12.5 milliGRAM(s) Oral two times a day  nystatin Powder 1 Application(s) Topical two times a day  pantoprazole    Tablet 40 milliGRAM(s) Oral before breakfast    MEDICATIONS  (PRN):  acetaminophen     Tablet .. 650 milliGRAM(s) Oral every 6 hours PRN Mild Pain (1 - 3)  dextrose Oral Gel 15 Gram(s) Oral once PRN Blood Glucose LESS THAN 70 milliGRAM(s)/deciliter  lidocaine 5% Ointment 1 Application(s) Topical three times a day PRN rib pain    Vital Signs Last 24 Hrs  T(F): 98.5 (30 Dec 2024 08:17), Max: 98.5 (30 Dec 2024 08:17)  HR: 71 (30 Dec 2024 08:17) (71 - 79)  BP: 147/84 (30 Dec 2024 08:17) (146/78 - 157/71)  RR: 16 (30 Dec 2024 08:17) (16 - 16)  SpO2: 94% (30 Dec 2024 08:17) (94% - 95%)  I&O's Summary        PHYSICAL EXAM:  HEAD:  Atraumatic, Normocephalic  EYES: EOMI,  conjunctiva and sclera clear  ENMT: Moist mucous membranes  NECK: Supple, No JVD  NERVOUS SYSTEM:  Alert & Oriented   CHEST/LUNG: b/l lower lungs crackles  HEART: Regular rate and rhythm  ABDOMEN: Soft, Nontender, Nondistended; Bowel sounds present  EXTREMITIES: No clubbing, cyanosis, no LE edema    LABS:                        10.2   7.37  )-----------( 199      ( 30 Dec 2024 08:35 )             31.7       12-30    137  |  102  |  14  ----------------------------<  96  4.5   |  25  |  0.52    Ca    9.2      30 Dec 2024 08:35    TPro  7.5  /  Alb  2.5  /  TBili  0.4  /  DBili  x   /  AST  22  /  ALT  23  /  AlkPhos  160  12-30                              POCT Blood Glucose.: 128 mg/dL (30 Dec 2024 12:02)  POCT Blood Glucose.: 129 mg/dL (30 Dec 2024 07:41)  POCT Blood Glucose.: 127 mg/dL (29 Dec 2024 20:58)  POCT Blood Glucose.: 101 mg/dL (29 Dec 2024 16:52)      Urinalysis Basic - ( 30 Dec 2024 08:35 )    Color: x / Appearance: x / SG: x / pH: x  Gluc: 96 mg/dL / Ketone: x  / Bili: x / Urobili: x   Blood: x / Protein: x / Nitrite: x   Leuk Esterase: x / RBC: x / WBC x   Sq Epi: x / Non Sq Epi: x / Bacteria: x        COVID-19 PCR: NotDetec (12-19-24 @ 17:29)      Care Discussed with Consultants/Other Providers: Yes. Rehab provider

## 2024-12-30 NOTE — PROGRESS NOTE ADULT - ASSESSMENT
79 year old, obese, female with PMH of DAYANA (on CPAP), aortic stenosis, HTN, HLD, current smoker, COPD, Type 2 Diabetes Mellitus, and non-alcoholic fatty liver disease; who presented to [facility] on 11/20 for a scheduled TAVR, and possible stent placement.  She reports several month history of DUFF while walking and climbing up stairs, fatigue and BL LE edema. On 11/20 she underwent TAVR, drainage of retroperitoneal hematoma and pericardial window with Dr. Garland and Noah. Intraoperatively, she had an episode of Vtach (on Amiodarone).      Her hospital course was complicated by the following: acute hypoxic respiratory failure (requiring BiPAP and eventual intubation on 11/25), hemorrhagic shock, AFIB w/ RVR, coffee ground emesis, Code Blue (s/p CPR resulting in rib fractures), AIDA, fever secondary to aspiration PNA, +pseudomonas in sputum, persistent leukocytosis and low grade fevers (on Vanco per ID), L lateral abdominal wall hematoma, distended gallbladder and gallbladder sludge, dysphagia (s/p trache and PEG placement on 12/3), decannulation (12/13), L groin delayed wound healing (required wound vac), and hyperglycemia.  Patient now admitted for a multidisciplinary rehab program. 12-19-24 @ 13:25        #Aortic stenosis S/p TAVR  - Comprehensive Rehab Program of PT/OT/SLP  - Plavix 75mg daily     #HTN  #AFIB w/ RVR  - Amiodarone 200mg daily   - continue Bumex 1 mg daily. Patient was encouraged to continue diuretics to avoid going into CHF.  - Metoprolol 12.5mg BID   - No therapeutic AC as pt had mass tranfusion protocol at [place]  - dvt ppx    #Type 2 Diabetes Mellitus  - A1c: 6.4% (Dec 2024)    - FS AC & HS  - Mod ISS  - D/C lantus and change to PO meds prior to discharge. Unable to start Glimepiride as not on formulary. Will start Tradjenta and metformin 500 mg bid  - Monitor glucoscans and supplement with ISS  - adjust meds as needed      #DAYANA  #COPD  - Duo-neb q 6hrs  - outpt pft studies and further evaluation    #Acute Hypoxic Respiratory Failures  - multifactorial, resolved  - decannulated 12/3  - PRN: Nebulizer QID   - Mucinex  - Chest PT    #Mood  - Escitalopram 20mg daily     #Dysphagia  - tolerating oral feeds,   - PEG to continue in situ and f/u GI outpatient  (placed 12/3)    #GI/Bowel Mgmt   - Pantoprazole  - Senna , Miralax     # GI ppx: Pantoprazole  # DVT: lovenox, TEDs

## 2024-12-30 NOTE — PROGRESS NOTE ADULT - SUBJECTIVE AND OBJECTIVE BOX
CC: S/p TAVR     Today's Subjective & Objective Findings:  Patient seen and examined at bedside this AM.  Admits to sleeping well.   Patient requesting for therapy to focus on  strength.   Noted to refuse Bumex over weekend. Encouraged patient to take this medication for optimal recovery.     Denies headache, dizziness, visual changes, chest pain, SOB/DUFF, abdominal pain, nausea, vomiting, diarrhea, dysuria, numbness or tingling. LBM 12/28    Therapy-- Ambulating 50ft RW with min assist  SLP--Mod cog deficits, impulsivity  Barriers--left groin wound, cognitive deficits  Est DC 1/4    Vital Signs Last 24 Hrs  T(C): 36.9 (30 Dec 2024 08:17), Max: 36.9 (30 Dec 2024 08:17)  T(F): 98.5 (30 Dec 2024 08:17), Max: 98.5 (30 Dec 2024 08:17)  HR: 71 (30 Dec 2024 08:17) (71 - 79)  BP: 147/84 (30 Dec 2024 08:17) (146/78 - 157/71)  BP(mean): --  RR: 16 (30 Dec 2024 08:17) (16 - 16)  SpO2: 94% (30 Dec 2024 08:17) (94% - 95%)    PHYSICAL EXAM  Constitutional -Comfortable, cheerful  Neck - Supple  Cardiovascular - Palpable peripheral pulses   Respiratory/Chest- decreased air entry b/l lower lobes and few rhonchi  Abdomen - Soft, Non-tender, mild erythema around PEG   Extremities - No cyanosis, No edema,     Neurologic Exam - Alert, Oriented  Sensory - Light touch sensation intact  Motor Function - Moves all extremities spontaneously  Skin -  Trach site with healthy scab, open to air,  L groin wound- wet to dry packing with gauze, CDI, L flank bruising, generalized ecchymosis and scabbing, b/l dry feet    LABS:                        10.2   7.37  )-----------( 199      ( 30 Dec 2024 08:35 )             31.7     12-30    137  |  102  |  14  ----------------------------<  96  4.5   |  25  |  0.52    Ca    9.2      30 Dec 2024 08:35    TPro  7.5  /  Alb  2.5[L]  /  TBili  0.4  /  DBili  x   /  AST  22  /  ALT  23  /  AlkPhos  160[H]  12-30      Urinalysis Basic - ( 30 Dec 2024 08:35 )    Color: x / Appearance: x / SG: x / pH: x  Gluc: 96 mg/dL / Ketone: x  / Bili: x / Urobili: x   Blood: x / Protein: x / Nitrite: x   Leuk Esterase: x / RBC: x / WBC x   Sq Epi: x / Non Sq Epi: x / Bacteria: x        < from: Xray Chest 1 View- PORTABLE-Urgent (Xray Chest 1 View- PORTABLE-Urgent .) (12.23.24 @ 11:01) >   XR CHEST PORTABLE URGENT 1V   ORDERED BY:  SOURAV SHEETS     PROCEDURE DATE:  12/23/2024      INTERPRETATION:  AP chest on December 23, 2024 at 10:51 AM. Patient has   wheezing.    Heart magnified by technique. TAVR aortic valve noted.    There is slight blunting right base laterally new since December 5, 2023.    IMPRESSION: Slight blunting of right base laterally new since prior.      CAPILLARY BLOOD GLUCOSE  POCT Blood Glucose.: 129 mg/dL (30 Dec 2024 07:41)  POCT Blood Glucose.: 127 mg/dL (29 Dec 2024 20:58)  POCT Blood Glucose.: 101 mg/dL (29 Dec 2024 16:52)  POCT Blood Glucose.: 88 mg/dL (29 Dec 2024 11:48)        MEDICATIONS  (STANDING):  albuterol/ipratropium for Nebulization 3 milliLiter(s) Nebulizer every 6 hours  aMIOdarone    Tablet 200 milliGRAM(s) Oral daily  AQUAPHOR (petrolatum Ointment) 1 Application(s) Topical two times a day  aspirin enteric coated 81 milliGRAM(s) Oral daily  buMETAnide 1 milliGRAM(s) Oral daily  clopidogrel Tablet 75 milliGRAM(s) Oral daily  dextrose 5%. 1000 milliLiter(s) (50 mL/Hr) IV Continuous <Continuous>  dextrose 5%. 1000 milliLiter(s) (100 mL/Hr) IV Continuous <Continuous>  dextrose 50% Injectable 25 Gram(s) IV Push once  dextrose 50% Injectable 12.5 Gram(s) IV Push once  dextrose 50% Injectable 25 Gram(s) IV Push once  escitalopram 20 milliGRAM(s) Oral at bedtime  glucagon  Injectable 1 milliGRAM(s) IntraMuscular once  guaiFENesin  milliGRAM(s) Oral every 12 hours  insulin glargine Injectable (LANTUS) 12 Unit(s) SubCutaneous at bedtime  insulin lispro (ADMELOG) corrective regimen sliding scale   SubCutaneous three times a day before meals  insulin lispro (ADMELOG) corrective regimen sliding scale   SubCutaneous at bedtime  metoprolol tartrate 12.5 milliGRAM(s) Oral two times a day  nystatin Powder 1 Application(s) Topical two times a day  pantoprazole    Tablet 40 milliGRAM(s) Oral before breakfast    MEDICATIONS  (PRN):  acetaminophen     Tablet .. 650 milliGRAM(s) Oral every 6 hours PRN Mild Pain (1 - 3)  dextrose Oral Gel 15 Gram(s) Oral once PRN Blood Glucose LESS THAN 70 milliGRAM(s)/deciliter  lidocaine 5% Ointment 1 Application(s) Topical three times a day PRN rib pain

## 2024-12-30 NOTE — PROGRESS NOTE ADULT - ASSESSMENT
79 year old, obese, female with PMH of DAYANA (on CPAP), aortic stenosis, HTN, HLD, current smoker, COPD, Type 2 Diabetes Mellitus, and non-alcoholic fatty liver disease; who presented to [place] on 11/20 for a scheduled TAVR, and possible stent placement.  She reports several month history of DUFF while walking and climbing up stairs, fatigue and BL LE edema. On 11/20 she underwent TAVR, drainage of retroperitoneal hematoma and pericardial window with Dr. Garland and Noah. Intraoperatively, she had an episode of Vtach (on Amiodarone).    Her hospital course was complicated by the following: acute hypoxic respiratory failure (requiring BiPAP and eventual intubation on 11/25), hemorrhagic shock, AFIB w/ RVR, coffee ground emesis, Code Blue (s/p CPR resulting in rib fractures), AIDA, fever secondary to aspiration PNA, +pseudomonas in sputum, persistent leukocytosis and low grade fevers (on Vanco per ID), L lateral abdominal wall hematoma, distended gallbladder and gallbladder sludge, dysphagia (s/p trache and PEG placement on 12/3), decannulation (12/13), L groin delayed wound healing (requiring wound vac), and hyperglycemia.  Patient now admitted for a multidisciplinary rehab program. 12-19-24 @ 13:25    * Left groin wound - wound recs appreciated   * Cognitive deficits - SLP following   * Requesting PEG removal - GI recs outpatient PEG removal after 1/7, if still admitted after 12/31 may reassess PEG removal while in hospital   * Gradual improvement with ambulatory distance, but still limited  * Patient requesting therapy to focus on  strengthening     #Aortic valve disease S/p TAVR  - Gait Instability, ADL impairments and Functional impairments:   Continue Comprehensive Rehab Program of PT/OT/SLP  - 3 hours a day, 5 days a week  - ASA 81mg daily  - Plavix 75mg daily     REHAB ISSUES  Decreased ambulatory distance  Left groin wound  Impaired balance  Resp failure     #Acute Hypoxic Respiratory Failures  #Cough  - PRN: Nebulizer QID   - on 1L NC; wean as tolerated as patient does not wear o2 at home.  - CXR with R lung base atelectasis  - Mucinex BID  - Chest PT   - Incentive Spirometer     #HTN  #AFIB w/ RVR  - Amiodarone 200mg daily   - Bumex 1mg daily   - Metoprolol 12.5mg BID     #Type 2 Diabetes Mellitus  - A1c: 6.4% (Dec 2024)    - FS AC & HS  - Mod ISS  - Lantus 12U HS    #Mood  - Escitalopram 20mg daily     #Skin - Trach site with healthy scab, open to air,  L groin wound- wet to dry packing with gauze, CDI, L flank bruising, generalized ecchymosis and scabbing, b/l dry feet  - Aquaphor BID   - Pressure injury/Skin: OOB to Chair, PT/OT   - Left groin wound - wound care recs appreciated   -- Suggest moisten dressing before removal, then daily Normal Saline Cleanse of Left groin wound, pat thoroughly dry.  Apply Cavillon film barrier daily to intact periwound skin, allow to dry.  Gently pack area daily with saline moistened non-woven gauze (half of one piece), cover with folded dry non-woven gauze, cover with Tegaderm.      #Pain Mgmt   - PRN: Tylenol     #GI/Bowel Mgmt   - Pantoprazole  - Senna , Miralax    #/Bladder Mgmt --voiding     #FEN   - Diet - Regular with Thin Liquids   - Dysphagia  SLP - evaluation and treatment  - S/p MBS on 12/23 - passed, diet upgraded (as above)  - Dysphagia--tolerating oral feeds, PEG placed 12/3 - GI recs outpatient PEG removal after 1/7, if still admitted after 12/31 may reassess PEG removal while in hospital     #Precautions / PROPHYLAXIS:   - Falls, Cardiac  - ortho: Weight bearing status: WBAT   - Lungs: Aspiration, Incentive Spirometer   - DVT: ASA 81mg daily & Plavix 75mg daily, TEDs   ----------------------------------------------  Next of Kin:   Daughter: Janie Drake: [phone]  ----------------------------------------------  IDT 12/24  Therapy-- Ambulating 50ft RW with min assist  SLP--Mod cog deficits, impulsivity  Barriers--left groin wound, cognitive deficits  Est 1/4  -----------------------------------------------  Dr. SMITH's Liaison with Family/Providers  12/27--I called and d/w daughter Ms Mendez an RN at [facility],   I discussed patient's clinical and functional update----ambulatory distance, limited, trach site healing, wound care for left groin site, improved resp function  She reports that prior to acute care, patient had been living alone, smoker, using nightly CPAP,but currently not smoking, no longer using CPAP  Due family training 1/2, She is worried about d/c home if not functionally optimized, falls risk, further functional deterioration etc  Family is open to home dc if patient is functionally optimized or RACHEL if goals not being met  We agreed that therapy will continue for now, decision on d/c disposition will be made at family training on Tue 1/2  At present we are getting GI to review PEG, We will continue monitoring blunding of rt costophrenic angle and resolving ? atelectasis  She was happy with the update  -----------------------------------------------    OUTPATIENT/FOLLOW UP:    Todd Devine MD  Vascular surgery, General surgery  [address]  [address]  [place] [address]  [phone]    Todd Odom MD  Cardiology, Interventional  [facility]   [address]  [address]  [place], [address] [phone]  -----------------------------------------------

## 2024-12-31 ENCOUNTER — TRANSCRIPTION ENCOUNTER (OUTPATIENT)
Age: 79
End: 2024-12-31

## 2024-12-31 LAB
GLUCOSE BLDC GLUCOMTR-MCNC: 101 MG/DL — HIGH (ref 70–99)
GLUCOSE BLDC GLUCOMTR-MCNC: 118 MG/DL — HIGH (ref 70–99)
GLUCOSE BLDC GLUCOMTR-MCNC: 123 MG/DL — HIGH (ref 70–99)
GLUCOSE BLDC GLUCOMTR-MCNC: 130 MG/DL — HIGH (ref 70–99)

## 2024-12-31 PROCEDURE — 99232 SBSQ HOSP IP/OBS MODERATE 35: CPT

## 2024-12-31 PROCEDURE — 99233 SBSQ HOSP IP/OBS HIGH 50: CPT

## 2024-12-31 RX ORDER — GUAIFENESIN 100 MG/5ML
1 SYRUP ORAL
Qty: 0 | Refills: 0 | DISCHARGE
Start: 2024-12-31

## 2024-12-31 RX ORDER — GUAIFENESIN 100 MG/5ML
5 SYRUP ORAL
Qty: 0 | Refills: 0 | DISCHARGE
Start: 2024-12-31

## 2024-12-31 RX ORDER — ACETAMINOPHEN 80 MG/.8ML
2 SOLUTION/ DROPS ORAL
Qty: 0 | Refills: 0 | DISCHARGE
Start: 2024-12-31

## 2024-12-31 RX ORDER — LINAGLIPTIN 5 MG/1
1 TABLET, FILM COATED ORAL
Qty: 0 | Refills: 0 | DISCHARGE
Start: 2024-12-31

## 2024-12-31 RX ORDER — CLOPIDOGREL BISULFATE 75 MG/1
1 TABLET, FILM COATED ORAL
Qty: 0 | Refills: 0 | DISCHARGE
Start: 2024-12-31

## 2024-12-31 RX ORDER — ESCITALOPRAM OXALATE 10 MG/1
1 TABLET ORAL
Qty: 0 | Refills: 0 | DISCHARGE
Start: 2024-12-31

## 2024-12-31 RX ORDER — BENZONATATE 100 MG
1 CAPSULE ORAL
Qty: 0 | Refills: 0 | DISCHARGE
Start: 2024-12-31

## 2024-12-31 RX ORDER — BUMETANIDE 2 MG/1
1 TABLET ORAL
Qty: 0 | Refills: 0 | DISCHARGE
Start: 2024-12-31

## 2024-12-31 RX ORDER — METOPROLOL TARTRATE 50 MG
12.5 TABLET ORAL
Qty: 0 | Refills: 0 | DISCHARGE
Start: 2024-12-31

## 2024-12-31 RX ORDER — METFORMIN 850 MG/1
1 TABLET ORAL
Qty: 0 | Refills: 0 | DISCHARGE
Start: 2024-12-31

## 2024-12-31 RX ORDER — AMIODARONE HYDROCHLORIDE 200 MG/1
1 TABLET ORAL
Qty: 0 | Refills: 0 | DISCHARGE
Start: 2024-12-31

## 2024-12-31 RX ORDER — ASPIRIN 81 MG
1 TABLET, DELAYED RELEASE (ENTERIC COATED) ORAL
Qty: 0 | Refills: 0 | DISCHARGE
Start: 2024-12-31

## 2024-12-31 RX ORDER — NYSTATIN TOPICAL POWDER 100000 U/G
1 POWDER TOPICAL
Qty: 0 | Refills: 0 | DISCHARGE
Start: 2024-12-31

## 2024-12-31 RX ORDER — PANTOPRAZOLE 40 MG/1
1 TABLET, DELAYED RELEASE ORAL
Qty: 0 | Refills: 0 | DISCHARGE
Start: 2024-12-31

## 2024-12-31 RX ORDER — IPRATROPIUM BROMIDE AND ALBUTEROL SULFATE .5; 2.5 MG/3ML; MG/3ML
3 SOLUTION RESPIRATORY (INHALATION)
Qty: 0 | Refills: 0 | DISCHARGE
Start: 2024-12-31

## 2024-12-31 RX ADMIN — NYSTATIN TOPICAL POWDER 1 APPLICATION(S): 100000 POWDER TOPICAL at 05:17

## 2024-12-31 RX ADMIN — IPRATROPIUM BROMIDE AND ALBUTEROL SULFATE 3 MILLILITER(S): .5; 2.5 SOLUTION RESPIRATORY (INHALATION) at 05:30

## 2024-12-31 RX ADMIN — BUMETANIDE 1 MILLIGRAM(S): 2 TABLET ORAL at 07:48

## 2024-12-31 RX ADMIN — Medication 12.5 MILLIGRAM(S): at 17:04

## 2024-12-31 RX ADMIN — PANTOPRAZOLE 40 MILLIGRAM(S): 40 TABLET, DELAYED RELEASE ORAL at 05:04

## 2024-12-31 RX ADMIN — Medication 100 MILLIGRAM(S): at 04:36

## 2024-12-31 RX ADMIN — METFORMIN 500 MILLIGRAM(S): 850 TABLET ORAL at 07:48

## 2024-12-31 RX ADMIN — LINAGLIPTIN 5 MILLIGRAM(S): 5 TABLET, FILM COATED ORAL at 11:59

## 2024-12-31 RX ADMIN — Medication 100 MILLIGRAM(S): at 05:04

## 2024-12-31 RX ADMIN — IPRATROPIUM BROMIDE AND ALBUTEROL SULFATE 3 MILLILITER(S): .5; 2.5 SOLUTION RESPIRATORY (INHALATION) at 08:28

## 2024-12-31 RX ADMIN — AMIODARONE HYDROCHLORIDE 200 MILLIGRAM(S): 200 TABLET ORAL at 05:04

## 2024-12-31 RX ADMIN — METFORMIN 500 MILLIGRAM(S): 850 TABLET ORAL at 17:05

## 2024-12-31 RX ADMIN — Medication 81 MILLIGRAM(S): at 11:59

## 2024-12-31 RX ADMIN — Medication 12.5 MILLIGRAM(S): at 05:04

## 2024-12-31 RX ADMIN — NYSTATIN TOPICAL POWDER 1 APPLICATION(S): 100000 POWDER TOPICAL at 17:22

## 2024-12-31 RX ADMIN — Medication 600 MILLIGRAM(S): at 17:05

## 2024-12-31 RX ADMIN — IPRATROPIUM BROMIDE AND ALBUTEROL SULFATE 3 MILLILITER(S): .5; 2.5 SOLUTION RESPIRATORY (INHALATION) at 19:38

## 2024-12-31 RX ADMIN — ESCITALOPRAM OXALATE 20 MILLIGRAM(S): 10 TABLET ORAL at 21:12

## 2024-12-31 RX ADMIN — Medication 600 MILLIGRAM(S): at 05:04

## 2024-12-31 RX ADMIN — CLOPIDOGREL BISULFATE 75 MILLIGRAM(S): 75 TABLET, FILM COATED ORAL at 11:59

## 2024-12-31 RX ADMIN — Medication 1 APPLICATION(S): at 17:22

## 2024-12-31 NOTE — PROGRESS NOTE ADULT - ASSESSMENT
79 year old, obese, female with PMH of DAYANA (on CPAP), aortic stenosis, HTN, HLD, current smoker, COPD, Type 2 Diabetes Mellitus, and non-alcoholic fatty liver disease; who presented to [facility] on 11/20 for a scheduled TAVR, and possible stent placement.  She reports several month history of DUFF while walking and climbing up stairs, fatigue and BL LE edema. On 11/20 she underwent TAVR, drainage of retroperitoneal hematoma and pericardial window with Dr. Garland and Noah. Intraoperatively, she had an episode of Vtach (on Amiodarone).      Her hospital course was complicated by the following: acute hypoxic respiratory failure (requiring BiPAP and eventual intubation on 11/25), hemorrhagic shock, AFIB w/ RVR, coffee ground emesis, Code Blue (s/p CPR resulting in rib fractures), AIDA, fever secondary to aspiration PNA, +pseudomonas in sputum, persistent leukocytosis and low grade fevers (on Vanco per ID), L lateral abdominal wall hematoma, distended gallbladder and gallbladder sludge, dysphagia (s/p trache and PEG placement on 12/3), decannulation (12/13), L groin delayed wound healing (required wound vac), and hyperglycemia.  Patient now admitted for a multidisciplinary rehab program. 12-19-24 @ 13:25      #Aortic stenosis S/p TAVR  - Comprehensive Rehab Program of PT/OT/SLP  - Plavix 75mg daily     #HTN  #AFIB w/ RVR  - Amiodarone 200mg daily   - continue Bumex 1 mg daily. Patient was encouraged to continue diuretics to avoid going into CHF.  - Metoprolol 12.5mg BID   - No therapeutic AC as pt had mass tranfusion protocol at [place]  - dvt ppx    #Type 2 Diabetes Mellitus  - A1c: 6.4% (Dec 2024)    - FS AC & HS  - Mod ISS  - D/C lantus and change to PO meds prior to discharge. Unable to start Glimepiride as not on formulary. Will start Tradjenta and metformin 500 mg bid  - Monitor glucose and supplement with ISS  - adjust meds as needed    #DAYANA  #COPD  - Duo-neb q 6hrs  - outpt pft studies and further evaluation    #Acute Hypoxic Respiratory Failures  - multifactorial, resolved  - decannulated 12/3  - PRN: Nebulizer QID   - Mucinex  - Chest PT    #Mood  - Escitalopram 20mg daily     #Dysphagia  - tolerating oral feeds,   - PEG to continue in situ. GI following for possible removal this week    #GI/Bowel Mgmt   - Pantoprazole  - Senna , Miralax     # GI ppx: Pantoprazole  # DVT: lovenox, TEDs

## 2024-12-31 NOTE — PROGRESS NOTE ADULT - SUBJECTIVE AND OBJECTIVE BOX
CC: S/p TAVR     Today's Subjective & Objective Findings:  Patient seen and examined at bedside this AM.  Admits to sleeping well.   Encouraged IS.   Discussed GI recs, and repeat CXR for 1/2.     Denies headache, dizziness, visual changes, chest pain, SOB/DUFF, abdominal pain, nausea, vomiting, diarrhea, dysuria, numbness or tingling. LBM 12/30    Therapy-- Ambulating 50ft RW with min assist  SLP--Mod cog deficits, impulsivity  Barriers--left groin wound, cognitive deficits  Est DC 1/4    Vital Signs Last 24 Hrs  T(C): 36.9 (31 Dec 2024 07:43), Max: 36.9 (30 Dec 2024 19:40)  T(F): 98.5 (31 Dec 2024 07:43), Max: 98.5 (31 Dec 2024 07:43)  HR: 65 (31 Dec 2024 07:43) (65 - 96)  BP: 148/81 (31 Dec 2024 07:43) (143/82 - 161/81)  BP(mean): --  RR: 18 (31 Dec 2024 07:43) (18 - 18)  SpO2: 95% (31 Dec 2024 07:43) (88% - 95%)    PHYSICAL EXAM  Constitutional -Comfortable, cheerful  Neck - Supple  Cardiovascular - Palpable peripheral pulses   Respiratory/Chest- decreased air entry b/l lower lobes and few rhonchi  Abdomen - Soft, Non-tender, mild erythema around PEG   Extremities - No cyanosis, No edema,     Neurologic Exam - Alert, Oriented  Sensory - Light touch sensation intact  Motor Function - Moves all extremities spontaneously  Skin -  Trach site with healthy scab, open to air,  L groin wound- wet to dry packing with gauze, CDI, L flank bruising, generalized ecchymosis and scabbing, b/l dry feet    LABS:                        10.2   7.37  )-----------( 199      ( 30 Dec 2024 08:35 )             31.7     12-30    137  |  102  |  14  ----------------------------<  96  4.5   |  25  |  0.52    Ca    9.2      30 Dec 2024 08:35    TPro  7.5  /  Alb  2.5[L]  /  TBili  0.4  /  DBili  x   /  AST  22  /  ALT  23  /  AlkPhos  160[H]  12-30      Urinalysis Basic - ( 30 Dec 2024 08:35 )    Color: x / Appearance: x / SG: x / pH: x  Gluc: 96 mg/dL / Ketone: x  / Bili: x / Urobili: x   Blood: x / Protein: x / Nitrite: x   Leuk Esterase: x / RBC: x / WBC x   Sq Epi: x / Non Sq Epi: x / Bacteria: x        < from: Xray Chest 1 View- PORTABLE-Urgent (Xray Chest 1 View- PORTABLE-Urgent .) (12.23.24 @ 11:01) >   XR CHEST PORTABLE URGENT 1V   ORDERED BY:  SOURAV SHEETS     PROCEDURE DATE:  12/23/2024      INTERPRETATION:  AP chest on December 23, 2024 at 10:51 AM. Patient has   wheezing.    Heart magnified by technique. TAVR aortic valve noted.    There is slight blunting right base laterally new since December 5, 2023.    IMPRESSION: Slight blunting of right base laterally new since prior.      CAPILLARY BLOOD GLUCOSE  POCT Blood Glucose.: 118 mg/dL (31 Dec 2024 07:46)  POCT Blood Glucose.: 110 mg/dL (30 Dec 2024 21:20)  POCT Blood Glucose.: 112 mg/dL (30 Dec 2024 16:50)  POCT Blood Glucose.: 128 mg/dL (30 Dec 2024 12:02)        MEDICATIONS  (STANDING):  albuterol/ipratropium for Nebulization 3 milliLiter(s) Nebulizer every 6 hours  aMIOdarone    Tablet 200 milliGRAM(s) Oral daily  AQUAPHOR (petrolatum Ointment) 1 Application(s) Topical two times a day  aspirin enteric coated 81 milliGRAM(s) Oral daily  buMETAnide 1 milliGRAM(s) Oral daily  clopidogrel Tablet 75 milliGRAM(s) Oral daily  dextrose 5%. 1000 milliLiter(s) (50 mL/Hr) IV Continuous <Continuous>  dextrose 5%. 1000 milliLiter(s) (100 mL/Hr) IV Continuous <Continuous>  dextrose 50% Injectable 25 Gram(s) IV Push once  dextrose 50% Injectable 12.5 Gram(s) IV Push once  dextrose 50% Injectable 25 Gram(s) IV Push once  escitalopram 20 milliGRAM(s) Oral at bedtime  glucagon  Injectable 1 milliGRAM(s) IntraMuscular once  guaiFENesin  milliGRAM(s) Oral every 12 hours  insulin lispro (ADMELOG) corrective regimen sliding scale   SubCutaneous three times a day before meals  insulin lispro (ADMELOG) corrective regimen sliding scale   SubCutaneous at bedtime  linagliptin 5 milliGRAM(s) Oral daily  metFORMIN 500 milliGRAM(s) Oral two times a day with meals  metoprolol tartrate 12.5 milliGRAM(s) Oral two times a day  nystatin Powder 1 Application(s) Topical two times a day  pantoprazole    Tablet 40 milliGRAM(s) Oral before breakfast    MEDICATIONS  (PRN):  acetaminophen     Tablet .. 650 milliGRAM(s) Oral every 6 hours PRN Mild Pain (1 - 3)  benzonatate 100 milliGRAM(s) Oral three times a day PRN Cough  dextrose Oral Gel 15 Gram(s) Oral once PRN Blood Glucose LESS THAN 70 milliGRAM(s)/deciliter  guaiFENesin Oral Liquid (Sugar-Free) 100 milliGRAM(s) Oral every 6 hours PRN Cough  lidocaine 5% Ointment 1 Application(s) Topical three times a day PRN rib pain   CC: S/p TAVR     Today's Subjective & Objective Findings:  Patient seen and examined at bedside this AM.  Admits to sleeping well. Tolerating oral diet  Encouraged IS.   Discussed GI recs, after their review of patient's PEG  An team's plan for CXR for 1/2 to monitor Rt lower lung atelectasis   Patient confirmed that she had been using CPAP nightly at home prior to hosp admission, has not requiring since recovery from surgery, and all through the rest of her hosp course. including rehab treatment    Denies headache, dizziness, visual changes, chest pain, SOB/DUFF, abdominal pain, nausea, vomiting, diarrhea, dysuria, numbness or tingling. LBM 12/30  Left groin wound on wound care    Therapy  Ambulated 150 feet with rollator with supervision, forward flexed posture with  increased time.  Sitting rest breaks as needed on rollator.  Patient able to  correctly and safely stabilize rollator on wall, lock brakes and turn to sit.  NAVIN RPE 11 with ambulation trials.  Negotiated up and down 8-6 inch steps with bilateral handrails, step-to pattern,  with supervision.  NAVIN RPE 12 with stair training.  Seated yellow resistance band hip abduction and knee flexion 3 x 10 reps each.  Toileting:  Ambulatory transfer to [facility] with no device with close  supervision; patient able to manage pants and perform alexandru-hygiene without    Vital Signs Last 24 Hrs  T(C): 36.9 (31 Dec 2024 07:43), Max: 36.9 (30 Dec 2024 19:40)  T(F): 98.5 (31 Dec 2024 07:43), Max: 98.5 (31 Dec 2024 07:43)  HR: 65 (31 Dec 2024 07:43) (65 - 96)  BP: 148/81 (31 Dec 2024 07:43) (143/82 - 161/81)  RR: 18 (31 Dec 2024 07:43) (18 - 18)  SpO2: 95% (31 Dec 2024 07:43) (88% - 95%)    PHYSICAL EXAM  Constitutional -Comfortable, cheerful  Neck - Supple  Cardiovascular - Palpable peripheral pulses   Respiratory/Chest- decreased air entry b/l Rt lower lobes and few rhonchi. normal air entry left lobes   Abdomen - Soft, Non-tender, mild erythema around PEG   Extremities - No cyanosis, No edema,     Neurologic Exam - Alert, Oriented  Sensory - Light touch sensation intact  Motor Function - Moves all extremities spontaneously  Skin -  Trach site with healthy scab, open to air,  L groin wound- wet to dry packing with gauze, CDI, L flank bruising, generalized ecchymosis and scabbing, b/l dry feet    LABS:                  10.2   7.37  )-----------( 199      ( 30 Dec 2024 08:35 )             31.7     12-30    137  |  102  |  14  ----------------------------<  96  4.5   |  25  |  0.52    Ca    9.2      30 Dec 2024 08:35    TPro  7.5  /  Alb  2.5[L]  /  TBili  0.4  /  DBili  x   /  AST  22  /  ALT  23  /  AlkPhos  160[H]  12-30      Urinalysis Basic - ( 30 Dec 2024 08:35 )    Color: x / Appearance: x / SG: x / pH: x  Gluc: 96 mg/dL / Ketone: x  / Bili: x / Urobili: x   Blood: x / Protein: x / Nitrite: x   Leuk Esterase: x / RBC: x / WBC x   Sq Epi: x / Non Sq Epi: x / Bacteria: x      < from: Xray Chest 1 View- PORTABLE-Urgent (Xray Chest 1 View- PORTABLE-Urgent .) (12.23.24 @ 11:01) >   XR CHEST PORTABLE URGENT 1V   ORDERED BY:  SOURAV SHEETS     PROCEDURE DATE:  12/23/2024      INTERPRETATION:  AP chest on December 23, 2024 at 10:51 AM. Patient has   wheezing.    Heart magnified by technique. TAVR aortic valve noted.    There is slight blunting right base laterally new since December 5, 2023.    IMPRESSION: Slight blunting of right base laterally new since prior.  CAPILLARY BLOOD GLUCOSE  POCT Blood Glucose.: 118 mg/dL (31 Dec 2024 07:46)  POCT Blood Glucose.: 110 mg/dL (30 Dec 2024 21:20)  POCT Blood Glucose.: 112 mg/dL (30 Dec 2024 16:50)  POCT Blood Glucose.: 128 mg/dL (30 Dec 2024 12:02)        MEDICATIONS  (STANDING):  albuterol/ipratropium for Nebulization 3 milliLiter(s) Nebulizer every 6 hours  aMIOdarone    Tablet 200 milliGRAM(s) Oral daily  AQUAPHOR (petrolatum Ointment) 1 Application(s) Topical two times a day  aspirin enteric coated 81 milliGRAM(s) Oral daily  buMETAnide 1 milliGRAM(s) Oral daily  clopidogrel Tablet 75 milliGRAM(s) Oral daily  dextrose 5%. 1000 milliLiter(s) (50 mL/Hr) IV Continuous <Continuous>  dextrose 5%. 1000 milliLiter(s) (100 mL/Hr) IV Continuous <Continuous>  dextrose 50% Injectable 25 Gram(s) IV Push once  dextrose 50% Injectable 12.5 Gram(s) IV Push once  dextrose 50% Injectable 25 Gram(s) IV Push once  escitalopram 20 milliGRAM(s) Oral at bedtime  glucagon  Injectable 1 milliGRAM(s) IntraMuscular once  guaiFENesin  milliGRAM(s) Oral every 12 hours  insulin lispro (ADMELOG) corrective regimen sliding scale   SubCutaneous three times a day before meals  insulin lispro (ADMELOG) corrective regimen sliding scale   SubCutaneous at bedtime  linagliptin 5 milliGRAM(s) Oral daily  metFORMIN 500 milliGRAM(s) Oral two times a day with meals  metoprolol tartrate 12.5 milliGRAM(s) Oral two times a day  nystatin Powder 1 Application(s) Topical two times a day  pantoprazole    Tablet 40 milliGRAM(s) Oral before breakfast    MEDICATIONS  (PRN):  acetaminophen     Tablet .. 650 milliGRAM(s) Oral every 6 hours PRN Mild Pain (1 - 3)  benzonatate 100 milliGRAM(s) Oral three times a day PRN Cough  dextrose Oral Gel 15 Gram(s) Oral once PRN Blood Glucose LESS THAN 70 milliGRAM(s)/deciliter  guaiFENesin Oral Liquid (Sugar-Free) 100 milliGRAM(s) Oral every 6 hours PRN Cough  lidocaine 5% Ointment 1 Application(s) Topical three times a day PRN rib pain

## 2024-12-31 NOTE — DISCHARGE NOTE PROVIDER - NSDCFUADDINST_GEN_ALL_CORE_FT
Suggest moisten dressing before removal, then daily Normal Saline Cleanse of Left groin wound, pat thoroughly dry.  Apply Cavillon film barrier daily to intact periwound skin, allow to dry.  Gently pack area daily with saline moistened non-woven gauze (half of one piece), cover with folded dry non-woven gauze, cover with Tegaderm.

## 2024-12-31 NOTE — PROGRESS NOTE ADULT - SUBJECTIVE AND OBJECTIVE BOX
PROGRESS NOTE:     Patient is a 79y old  Female who presents with a chief complaint of Aortic Valve Disease s/p TAVR (30 Dec 2024 13:20)          SUBJECTIVE & OBJECTIVE:   Pt seen and examined at bedside in AM    no overnight events.     REVIEW OF SYSTEMS: remaining ROS negative     PHYSICAL EXAM:  VITALS:  Vital Signs Last 24 Hrs  T(C): 36.9 (31 Dec 2024 07:43), Max: 36.9 (30 Dec 2024 19:40)  T(F): 98.5 (31 Dec 2024 07:43), Max: 98.5 (31 Dec 2024 07:43)  HR: 65 (31 Dec 2024 07:43) (65 - 96)  BP: 148/81 (31 Dec 2024 07:43) (143/82 - 161/81)  BP(mean): --  RR: 18 (31 Dec 2024 07:43) (18 - 18)  SpO2: 95% (31 Dec 2024 07:43) (88% - 95%)    Parameters below as of 31 Dec 2024 07:43  Patient On (Oxygen Delivery Method): room air          GENERAL: NAD,  no increased WOB  HEAD:  Atraumatic, Normocephalic  EYES: EOMI,conjunctiva and sclera clear  ENMT: Moist mucous membranes  NECK: Supple, No JVD  NERVOUS SYSTEM:  Alert & Oriented   CHEST/LUNG: Clear to auscultation bilaterally; No rales, rhonchi, wheezing  HEART: Regular rate and rhythm  ABDOMEN: Soft, Nontender, Nondistended; Bowel sounds present  EXTREMITIES: No clubbing, cyanosis, calf tenderness      MEDICATIONS  (STANDING):  albuterol/ipratropium for Nebulization 3 milliLiter(s) Nebulizer every 6 hours  aMIOdarone    Tablet 200 milliGRAM(s) Oral daily  AQUAPHOR (petrolatum Ointment) 1 Application(s) Topical two times a day  aspirin enteric coated 81 milliGRAM(s) Oral daily  buMETAnide 1 milliGRAM(s) Oral daily  clopidogrel Tablet 75 milliGRAM(s) Oral daily  dextrose 5%. 1000 milliLiter(s) (50 mL/Hr) IV Continuous <Continuous>  dextrose 5%. 1000 milliLiter(s) (100 mL/Hr) IV Continuous <Continuous>  dextrose 50% Injectable 25 Gram(s) IV Push once  dextrose 50% Injectable 12.5 Gram(s) IV Push once  dextrose 50% Injectable 25 Gram(s) IV Push once  escitalopram 20 milliGRAM(s) Oral at bedtime  glucagon  Injectable 1 milliGRAM(s) IntraMuscular once  guaiFENesin  milliGRAM(s) Oral every 12 hours  insulin lispro (ADMELOG) corrective regimen sliding scale   SubCutaneous three times a day before meals  insulin lispro (ADMELOG) corrective regimen sliding scale   SubCutaneous at bedtime  linagliptin 5 milliGRAM(s) Oral daily  metFORMIN 500 milliGRAM(s) Oral two times a day with meals  metoprolol tartrate 12.5 milliGRAM(s) Oral two times a day  nystatin Powder 1 Application(s) Topical two times a day  pantoprazole    Tablet 40 milliGRAM(s) Oral before breakfast    MEDICATIONS  (PRN):  acetaminophen     Tablet .. 650 milliGRAM(s) Oral every 6 hours PRN Mild Pain (1 - 3)  benzonatate 100 milliGRAM(s) Oral three times a day PRN Cough  dextrose Oral Gel 15 Gram(s) Oral once PRN Blood Glucose LESS THAN 70 milliGRAM(s)/deciliter  guaiFENesin Oral Liquid (Sugar-Free) 100 milliGRAM(s) Oral every 6 hours PRN Cough  lidocaine 5% Ointment 1 Application(s) Topical three times a day PRN rib pain      Allergies    penicillins (Hives)  Tetanus Immune Globulin (Unknown)  adhesives (Rash)  Typhoid Vaccines (Anaphylaxis)    Intolerances              LABS:                           10.2   7.37  )-----------( 199      ( 30 Dec 2024 08:35 )             31.7     12-30    137  |  102  |  14  ----------------------------<  96  4.5   |  25  |  0.52    Ca    9.2      30 Dec 2024 08:35    TPro  7.5  /  Alb  2.5[L]  /  TBili  0.4  /  DBili  x   /  AST  22  /  ALT  23  /  AlkPhos  160[H]  12-30      Urinalysis Basic - ( 30 Dec 2024 08:35 )    Color: x / Appearance: x / SG: x / pH: x  Gluc: 96 mg/dL / Ketone: x  / Bili: x / Urobili: x   Blood: x / Protein: x / Nitrite: x   Leuk Esterase: x / RBC: x / WBC x   Sq Epi: x / Non Sq Epi: x / Bacteria: x      CAPILLARY BLOOD GLUCOSE      POCT Blood Glucose.: 118 mg/dL (31 Dec 2024 07:46)  POCT Blood Glucose.: 110 mg/dL (30 Dec 2024 21:20)  POCT Blood Glucose.: 112 mg/dL (30 Dec 2024 16:50)  POCT Blood Glucose.: 128 mg/dL (30 Dec 2024 12:02)                RECENT CULTURES:          RADIOLOGY & ADDITIONAL TESTS:

## 2024-12-31 NOTE — PROGRESS NOTE ADULT - ASSESSMENT
79 year old, obese, female with PMH of DAYANA (on CPAP), aortic stenosis, HTN, HLD, current smoker, COPD, Type 2 Diabetes Mellitus, and non-alcoholic fatty liver disease    Currently undergoing rehab, decannulated, tolerating po intake, peg is not being used. Patient educated on peg removal time frame, peg placed 12/03. Patient verbalized understanding.       79 year old woman with PMH of obesity, DAYANA (on CPAP), aortic stenosis, HTN, HLD, current smoker, COPD, Type 2 Diabetes Mellitus, and MASLD, s/p TAVR with complicated postop course, s/p PEG, now tolerating oral feeds.

## 2024-12-31 NOTE — DISCHARGE NOTE PROVIDER - HOSPITAL COURSE
HPI:  Marysol Drake is a 79 year old, obese, female with PMH of DAYANA (on CPAP), aortic stenosis, HTN, HLD, current smoker, COPD, Type 2 Diabetes Mellitus, and non-alcoholic fatty liver disease; who presented to [facility] on 11/20 for a scheduled TAVR, and possible stent placement.  She reports several month history of DUFF while walking and climbing up stairs, fatigue and BL LE edema. On 11/20 she underwent TAVR, drainage of retroperitoneal hematoma and pericardial window with Dr. Garland and Noah. Intraoperatively, she had an episode of Vtach (on Amiodarone).      Her hospital course was complicated by the following: acute hypoxic respiratory failure (requiring BiPAP and eventual intubation on 11/25), hemorrhagic shock, AFIB w/ RVR, coffee ground emesis, Code Blue (s/p CPR resulting in rib fractures), AIDA, fever secondary to aspiration PNA, +pseudomonas in sputum, persistent leukocytosis and low grade fevers (on Vanco per ID), L lateral abdominal wall hematoma, distended gallbladder and gallbladder sludge, dysphagia (s/p trache and PEG placement on 12/3), decannulation (12/13), L groin delayed wound healing (requiring wound vac), and hyperglycemia.     PM&R was consulted and deemed her an appropriate candidate for IRF. She was medically stabilized and cleared for discharge to [facility].  (19 Dec 2024 13:21)      Patient was evaluated by PM&R and therapy for gait/ADL impairments and recommended acute rehabilitation. Patient was medically optimized for discharge to [facility] on 12/19/24. Admitted with gait instability, ADL, and functional impairments.     Rehab course significant for:      All other medical co-morbidities were stable. Patient tolerated course of inpatient PT/OT/SLP rehab with significant improvements and met rehab goals prior to discharge. Patient was medically cleared on ___ for discharge to ___. HPI:  Marysol Drake is a 79 year old, obese, female with PMH of DAYANA (on CPAP), aortic stenosis, HTN, HLD, current smoker, COPD, Type 2 Diabetes Mellitus, and non-alcoholic fatty liver disease; who presented to [facility] on 11/20 for a scheduled TAVR, and possible stent placement.  She reports several month history of DUFF while walking and climbing up stairs, fatigue and BL LE edema. On 11/20 she underwent TAVR, drainage of retroperitoneal hematoma and pericardial window with Dr. Garland and Noah. Intraoperatively, she had an episode of Vtach (on Amiodarone).      Her hospital course was complicated by the following: acute hypoxic respiratory failure (requiring BiPAP and eventual intubation on 11/25), hemorrhagic shock, AFIB w/ RVR, coffee ground emesis, Code Blue (s/p CPR resulting in rib fractures), AIDA, fever secondary to aspiration PNA, +pseudomonas in sputum, persistent leukocytosis and low grade fevers (on Vanco per ID), L lateral abdominal wall hematoma, distended gallbladder and gallbladder sludge, dysphagia (s/p trache and PEG placement on 12/3), decannulation (12/13), L groin delayed wound healing (requiring wound vac), and hyperglycemia.     PM&R was consulted and deemed her an appropriate candidate for IRF. She was medically stabilized and cleared for discharge to [facility].  (19 Dec 2024 13:21)      Patient was evaluated by PM&R and therapy for gait/ADL impairments and recommended acute rehabilitation. Patient was medically optimized for discharge to [facility] on 12/19/24. Admitted with gait instability, ADL, and functional impairments.     Rehab course significant for:  12/20: Await WC recs- consider wound vac to L groin?   12/24: Bandaid over trache site. IS.   12/26: DC bowel prep.   12/27: Pt requesting PEG removal early. GI consult to reinforce. ASA and Plavix, per [facility] based on last EKG and AC recs from 12/26 (see below)   12/30: DM agents switched to oral. Patient requesting to focus on  strength.   12/31: Repeat CXR on 1/2 1/2: Chest PT. Await CT chest and pulm consult   1/3: CT chest with BL nodules, consolidations and .9cm anterior mediastinal mass.Pulm recs appreciated. Start Hycodan. Hold lunch for possible PEG tube removal today. Plavix DCd. GI unable to pull PEG tube because patient ate lunch. PEG removal tomorrow.   1/6: S/p PEG remoal on 1/4. Wound teaching for family       All other medical co-morbidities were stable. Patient tolerated course of inpatient PT/OT/SLP rehab with significant improvements and met rehab goals prior to discharge. Patient was /medically cleared on 1/7/24 for discharge to home. HPI:  Marysol Drake is a 79 year old, obese, female with PMH of DAYANA (on CPAP), aortic stenosis, HTN, HLD, current smoker, COPD, Type 2 Diabetes Mellitus, and non-alcoholic fatty liver disease; who presented to [facility] on 11/20 for a scheduled TAVR, and possible stent placement.  She reports several month history of DUFF while walking and climbing up stairs, fatigue and BL LE edema. On 11/20 she underwent TAVR, drainage of retroperitoneal hematoma and pericardial window with Dr. Garland and Noah. Intraoperatively, she had an episode of Vtach (on Amiodarone).      Her hospital course was complicated by the following: acute hypoxic respiratory failure (requiring BiPAP and eventual intubation on 11/25), hemorrhagic shock, AFIB w/ RVR, coffee ground emesis, Code Blue (s/p CPR resulting in rib fractures), AIDA, fever secondary to aspiration PNA, +pseudomonas in sputum, persistent leukocytosis and low grade fevers (on Vanco per ID), L lateral abdominal wall hematoma, distended gallbladder and gallbladder sludge, dysphagia (s/p trache and PEG placement on 12/3), decannulation (12/13), L groin delayed wound healing (requiring wound vac), and hyperglycemia.     PM&R was consulted and deemed her an appropriate candidate for IRF. She was medically stabilized and cleared for discharge to [facility].  (19 Dec 2024 13:21)      Patient was evaluated by PM&R and therapy for gait/ADL impairments and recommended acute rehabilitation. Patient was medically optimized for discharge to [facility] on 12/19/24. Admitted with gait instability, ADL, and functional impairments.     Rehab course significant for:  12/20: Await WC recs- consider wound vac to L groin?   12/24: Bandaid over trache site. IS.   12/26: DC bowel prep.   12/27: Pt requesting PEG removal early. GI consult to reinforce. ASA and Plavix, per [facility] Cardiology based on last EKG and AC recs from 12/26 (see below)   12/30: DM agents switched to oral. Patient requesting to focus on  strength.   12/31: Repeat CXR on 1/2 1/2: Chest PT. Await CT chest and pulm consult   1/3: CT chest with BL nodules, consolidations and .9cm anterior mediastinal mass.Pulm recs appreciated. Start Hycodan. Hold lunch for possible PEG tube removal today. Plavix DCd. GI unable to pull PEG tube because patient ate lunch. PEG removal tomorrow.   1/6: S/p PEG remoal on 1/4. Wound teaching for family     You made sustained progress during your acute rehab course, improved ambulatory distance, tolerating total oral diet   PEG removed 1/4. left groin wound healing   Pulmonary team followed your care for pulmonary nodules noted on imaging, for which you will continue f/u with your pulmonologist  You did not require a sleep device CPAP all through your acute rehab treatment and your breathing remained normal, you never required oxygen treatment  You family got regular updates on your care, d/c plan, falls prevention and need for supervision for which  the provided a live in home health aid. You were also referred to home care services.    IDT 12/31  Therapy-- Ambulating 150ft Rollator, supervision, 8 stairs 1 hand rail supervision  SLP--Mod cog deficits, impulsivity,   Barriers--left groin wound, cognitive deficits  Est 1/4 vs RACHEL dependent on family impression post family training on 1/2  Daughter currently abroad and coming for family training 1/2--revised to 1/7    All other medical co-morbidities were stable. Patient tolerated course of inpatient PT/OT/SLP rehab with significant improvements and met rehab goals prior to discharge. Patient was /medically stable 1/7/24 for discharge to home.

## 2024-12-31 NOTE — PROGRESS NOTE ADULT - NS ATTEND AMEND GEN_ALL_CORE FT
Agree with the assessment and plan of DENIS Kaplan.  S/P PEG placement, now tolerating oral feedings.  Ideal time to remove PEG would be after 5 weeks. If she is still admitted next week we can remove it then (after 1/7/2025), otherwise outpatient follow up as recommended above. Recommend holding Plavix 1 day prior to the removal.  T>35min

## 2024-12-31 NOTE — PROGRESS NOTE ADULT - ASSESSMENT
79 year old, obese, female with PMH of DAYANA (on CPAP), aortic stenosis, HTN, HLD, current smoker, COPD, Type 2 Diabetes Mellitus, and non-alcoholic fatty liver disease; who presented to [facility] on 11/20 for a scheduled TAVR, and possible stent placement.  She reports several month history of DUFF while walking and climbing up stairs, fatigue and BL LE edema. On 11/20 she underwent TAVR, drainage of retroperitoneal hematoma and pericardial window with Dr. Garland and Noah. Intraoperatively, she had an episode of Vtach (on Amiodarone).    Her hospital course was complicated by the following: acute hypoxic respiratory failure (requiring BiPAP and eventual intubation on 11/25), hemorrhagic shock, AFIB w/ RVR, coffee ground emesis, Code Blue (s/p CPR resulting in rib fractures), AIDA, fever secondary to aspiration PNA, +pseudomonas in sputum, persistent leukocytosis and low grade fevers (on Vanco per ID), L lateral abdominal wall hematoma, distended gallbladder and gallbladder sludge, dysphagia (s/p trache and PEG placement on 12/3), decannulation (12/13), L groin delayed wound healing (requiring wound vac), and hyperglycemia.  Patient now admitted for a multidisciplinary rehab program. 12-19-24 @ 13:25    * Left groin wound - wound recs appreciated   * Cognitive deficits - SLP following   * PEG - GI recs outpatient PEG removal after 1/7, if still admitted after 12/31 may reassess PEG removal while in hospital - will have GI re-eval on 1/2   * Cough - Repeat CXR on 1/2     #Aortic valve disease S/p TAVR  - Gait Instability, ADL impairments and Functional impairments:   Continue Comprehensive Rehab Program of PT/OT/SLP  - 3 hours a day, 5 days a week  - ASA 81mg daily  - Plavix 75mg daily     REHAB ISSUES  Decreased ambulatory distance  Left groin wound  Impaired balance  Resp failure     #Acute Hypoxic Respiratory Failures  #Cough  - PRN: Nebulizer QID   - on 1L NC; wean as tolerated as patient does not wear o2 at home.  - CXR with R lung base atelectasis  - Mucinex BID  - Chest PT   - Incentive Spirometer   - Repeat CXR 1/2     #HTN  #AFIB w/ RVR  - Amiodarone 200mg daily   - Bumex 1mg daily   - Metoprolol 12.5mg BID     #Type 2 Diabetes Mellitus  - A1c: 6.4% (Dec 2024)    - FS AC & HS  - Mod ISS  - Lantus 12U HS    #Mood  - Escitalopram 20mg daily     #Skin - Trach site with healthy scab, open to air,  L groin wound- wet to dry packing with gauze, CDI, L flank bruising, generalized ecchymosis and scabbing, b/l dry feet  - Aquaphor BID   - Pressure injury/Skin: OOB to Chair, PT/OT   - Left groin wound - wound care recs appreciated   -- Suggest moisten dressing before removal, then daily Normal Saline Cleanse of Left groin wound, pat thoroughly dry.  Apply Cavillon film barrier daily to intact periwound skin, allow to dry.  Gently pack area daily with saline moistened non-woven gauze (half of one piece), cover with folded dry non-woven gauze, cover with Tegaderm.      #Pain Mgmt   - PRN: Tylenol     #GI/Bowel Mgmt   - Pantoprazole  - Senna , Miralax    #/Bladder Mgmt --voiding     #FEN   - Diet - Regular with Thin Liquids   - Dysphagia  SLP - evaluation and treatment  - S/p MBS on 12/23 - passed, diet upgraded (as above)  - Dysphagia--tolerating oral feeds, PEG placed 12/3 - GI recs outpatient PEG removal after 1/7, if still admitted after 12/31 may reassess PEG removal while in hospital - will have GI re-eval on 1/2     #Precautions / PROPHYLAXIS:   - Falls, Cardiac  - ortho: Weight bearing status: WBAT   - Lungs: Aspiration, Incentive Spirometer   - DVT: ASA 81mg daily & Plavix 75mg daily, TEDs   ----------------------------------------------  Next of Kin:   Daughter: Janie Drake: [phone]  ----------------------------------------------  IDT 12/24  Therapy-- Ambulating 50ft RW with min assist  SLP--Mod cog deficits, impulsivity  Barriers--left groin wound, cognitive deficits  Est 1/4  -----------------------------------------------  Dr. SMITH's Liaison with Family/Providers  12/27--I called and d/w daughter Ms Mendez an RN at [facility],   I discussed patient's clinical and functional update----ambulatory distance, limited, trach site healing, wound care for left groin site, improved resp function  She reports that prior to acute care, patient had been living alone, smoker, using nightly CPAP,but currently not smoking, no longer using CPAP  Due family training 1/2, She is worried about d/c home if not functionally optimized, falls risk, further functional deterioration etc  Family is open to home dc if patient is functionally optimized or RACHEL if goals not being met  We agreed that therapy will continue for now, decision on d/c disposition will be made at family training on Tue 1/2  At present we are getting GI to review PEG, We will continue monitoring blunding of rt costophrenic angle and resolving ? atelectasis  She was happy with the update  -----------------------------------------------    OUTPATIENT/FOLLOW UP:    Todd Devine MD  Vascular surgery, General surgery  [address] [facility]  [address]  [place] [address]  [phone]    Todd Odom MD  Cardiology, Interventional  [facility]   [address] [facility]  [address]  [facility], [address] [phone]  ----------------------------------------------- 79 year old, obese, female with PMH of DAYANA (on CPAP), aortic stenosis, HTN, HLD, current smoker, COPD, Type 2 Diabetes Mellitus, and non-alcoholic fatty liver disease; who presented to [facility] on 11/20 for a scheduled TAVR, and possible stent placement.  She reports several month history of DUFF while walking and climbing up stairs, fatigue and BL LE edema. On 11/20 she underwent TAVR, drainage of retroperitoneal hematoma and pericardial window with Dr. Garland and Noah. Intraoperatively, she had an episode of Vtach (on Amiodarone).    Her hospital course was complicated by the following: acute hypoxic respiratory failure (requiring BiPAP and eventual intubation on 11/25), hemorrhagic shock, AFIB w/ RVR, coffee ground emesis, Code Blue (s/p CPR resulting in rib fractures), AIDA, fever secondary to aspiration PNA, +pseudomonas in sputum, persistent leukocytosis and low grade fevers (on Vanco per ID), L lateral abdominal wall hematoma, distended gallbladder and gallbladder sludge, dysphagia (s/p trache and PEG placement on 12/3), decannulation (12/13), L groin delayed wound healing (requiring wound vac), and hyperglycemia.  Patient now admitted for a multidisciplinary rehab program. 12-19-24 @ 13:25    * Left groin wound - wound recs appreciated   * Cognitive deficits - SLP following   * PEG - GI recs outpatient PEG removal after 1/7, if still admitted after 12/31 may reassess PEG removal while in hospital - will have GI re-eval on 1/2   * CoughR/t lung atelectasis - Repeat CXR on 1/2     #Aortic valve disease S/p TAVR  - Gait Instability, ADL impairments and Functional impairments:   Continue Comprehensive Rehab Program of PT/OT/SLP  - 3 hours a day, 5 days a week  - ASA 81mg daily  - Plavix 75mg daily     REHAB ISSUES  Decreased ambulatory distance  Left groin wound  Impaired balance  Resp failure     #Acute Hypoxic Respiratory Failures   #Cough  - PRN: Nebulizer QID   - on 1L NC; wean as tolerated as patient does not wear o2 at home.  (On nightly CPAP prior to acute care admission, but no longer requiring this all through admission)   - CXR with R lung base atelectasis  - Mucinex BID  - Chest PT   - Incentive Spirometer   - Repeat CXR 1/2     #HTN  #AFIB w/ RVR  - Amiodarone 200mg daily   - Bumex 1mg daily   - Metoprolol 12.5mg BID     #Type 2 Diabetes Mellitus  - A1c: 6.4% (Dec 2024)    - FS AC & HS  - Mod ISS  - Lantus 12U HS    #Mood  - Escitalopram 20mg daily     #Skin - Trach site with healthy scab, open to air,  L groin wound- wet to dry packing with gauze, CDI, L flank bruising, generalized ecchymosis and scabbing, b/l dry feet  - Aquaphor BID   - Pressure injury/Skin: OOB to Chair, PT/OT   - Left groin wound - wound care recs appreciated   --Moisten dressing before removal, then daily Normal Saline Cleanse of Left groin wound, pat thoroughly dry.  Apply Cavillon film barrier daily to intact periwound skin, allow to dry.  Gently pack area daily with saline moistened non-woven gauze (half of one piece), cover with folded dry non-woven gauze, cover with Tegaderm.    #Pain Mgmt   - PRN: Tylenol     #GI/Bowel Mgmt   - Pantoprazole  - Senna , Miralax    #/Bladder Mgmt --voiding     #FEN   - Diet - Regular with Thin Liquids   - Dysphagia  SLP - evaluation and treatment  - S/p MBS on 12/23 - passed, diet upgraded (as above)  - Dysphagia--tolerating oral feeds, PEG placed 12/3 - GI recs outpatient PEG removal after 1/7, if still admitted after 12/31 may reassess PEG removal while in hospital - will have GI re-eval on 1/2     #Precautions / PROPHYLAXIS:   - Falls, Cardiac  - ortho: Weight bearing status: WBAT   - Lungs: Aspiration, Incentive Spirometer   - DVT: ASA 81mg daily & Plavix 75mg daily, TEDs   ----------------------------------------------  Next of Kin:   Daughter: Janie Drake: [phone]  ----------------------------------------------  IDT 12/24  Therapy-- Ambulating 50ft RW with min assist  SLP--Mod cog deficits, impulsivity  Barriers--left groin wound, cognitive deficits  Est 1/4  -----------------------------------------------  Dr. SMITH's Liaison with Family/Providers  12/27--I called and d/w daughter Ms Mendez an RN at [facility],   I discussed patient's clinical and functional update----ambulatory distance, limited, trach site healing, wound care for left groin site, improved resp function  She reports that prior to acute care, patient had been living alone, smoker, using nightly CPAP,but currently not smoking, no longer using CPAP  Due family training 1/2, She is worried about d/c home if not functionally optimized, falls risk, further functional deterioration etc  Family is open to home dc if patient is functionally optimized or RACHEL if goals not being met  We agreed that therapy will continue for now, decision on d/c disposition will be made at family training on Tue 1/2  At present we are getting GI to review PEG, We will continue monitoring blunding of rt costophrenic angle and resolving ? atelectasis  She was happy with the update  -----------------------------------------------    OUTPATIENT/FOLLOW UP:    Todd Devine MD  Vascular surgery, General surgery  [address] [facility]  [address]  [place] [address]  [phone]    Todd Odom MD  Cardiology, Interventional  [facility]   [address]  [address]  [facility], [address] [phone]   79 year old, obese, female with PMH of DAYANA (on CPAP), aortic stenosis, HTN, HLD, current smoker, COPD, Type 2 Diabetes Mellitus, and non-alcoholic fatty liver disease; who presented to [facility] on 11/20 for a scheduled TAVR, and possible stent placement.  She reports several month history of DUFF while walking and climbing up stairs, fatigue and BL LE edema. On 11/20 she underwent TAVR, drainage of retroperitoneal hematoma and pericardial window with Dr. Garland and Noah. Intraoperatively, she had an episode of Vtach (on Amiodarone).    Her hospital course was complicated by the following: acute hypoxic respiratory failure (requiring BiPAP and eventual intubation on 11/25), hemorrhagic shock, AFIB w/ RVR, coffee ground emesis, Code Blue (s/p CPR resulting in rib fractures), AIDA, fever secondary to aspiration PNA, +pseudomonas in sputum, persistent leukocytosis and low grade fevers (on Vanco per ID), L lateral abdominal wall hematoma, distended gallbladder and gallbladder sludge, dysphagia (s/p trache and PEG placement on 12/3), decannulation (12/13), L groin delayed wound healing (requiring wound vac), and hyperglycemia.  Patient now admitted for a multidisciplinary rehab program. 12-19-24 @ 13:25    * Left groin wound - wound recs appreciated   * Cognitive deficits - SLP following   * PEG - GI recs outpatient PEG removal after 1/7, if still admitted after 12/31 may reassess PEG removal while in hospital - will have GI re-eval on 1/2   * CoughR/t lung atelectasis - Repeat CXR on 1/2     #Aortic valve disease S/p TAVR  - Gait Instability, ADL impairments and Functional impairments:   Continue Comprehensive Rehab Program of PT/OT/SLP  - 3 hours a day, 5 days a week  - ASA 81mg daily  - Plavix 75mg daily     REHAB ISSUES  Decreased ambulatory distance  Left groin wound  Impaired balance  Resp failure     #Acute Hypoxic Respiratory Failures   #Cough  - PRN: Nebulizer QID   - on 1L NC; wean as tolerated as patient does not wear o2 at home.  (On nightly CPAP prior to acute care admission, but no longer requiring this all through admission)   - CXR with R lung base atelectasis  - Mucinex BID  - Chest PT   - Incentive Spirometer   - Repeat CXR 1/2     #HTN  #AFIB w/ RVR  - Amiodarone 200mg daily   - Bumex 1mg daily   - Metoprolol 12.5mg BID     #Type 2 Diabetes Mellitus  - A1c: 6.4% (Dec 2024)    - FS AC & HS  - Mod ISS  - Lantus 12U HS    #Mood  - Escitalopram 20mg daily     #Skin - Trach site with healthy scab, open to air,  L groin wound- wet to dry packing with gauze, CDI, L flank bruising, generalized ecchymosis and scabbing, b/l dry feet  - Aquaphor BID   - Pressure injury/Skin: OOB to Chair, PT/OT   - Left groin wound - wound care recs appreciated   --Moisten dressing before removal, then daily Normal Saline Cleanse of Left groin wound, pat thoroughly dry.  Apply Cavillon film barrier daily to intact periwound skin, allow to dry.  Gently pack area daily with saline moistened non-woven gauze (half of one piece), cover with folded dry non-woven gauze, cover with Tegaderm.    #Pain Mgmt   - PRN: Tylenol     #GI/Bowel Mgmt   - Pantoprazole  - Senna , Miralax    #/Bladder Mgmt --voiding     #FEN   - Diet - Regular with Thin Liquids   - Dysphagia  SLP - evaluation and treatment  - S/p MBS on 12/23 - passed, diet upgraded (as above)  - Dysphagia--tolerating oral feeds, PEG placed 12/3 - GI recs outpatient PEG removal after 1/7, if still admitted after 12/31 may reassess PEG removal while in hospital - will have GI re-eval on 1/2     #Precautions / PROPHYLAXIS:   - Falls, Cardiac  - ortho: Weight bearing status: WBAT   - Lungs: Aspiration, Incentive Spirometer   - DVT: ASA 81mg daily & Plavix 75mg daily, TEDs   ----------------------------------------------  Next of Kin:   Daughter: Janie Drake: [phone]  ----------------------------------------------  IDT 12/31  Therapy-- Ambulating 150ft Rollator, supervision, 8 stairs 1 hand rail supervision  SLP--Mod cog deficits, impulsivity,   Barriers--left groin wound, cognitive deficits  Est 1/4 vs RACHEL dependent on family impression post family training on 1/2  Daughter currently abroad and coming for family training 1/2  -----------------------------------------------  Dr. SMITH's Liaison with Family/Providers  12/27--I called and d/w daughter Ms Mendez an RN at [facility],   I discussed patient's clinical and functional update----ambulatory distance, limited, trach site healing, wound care for left groin site, improved resp function  She reports that prior to acute care, patient had been living alone, smoker, using nightly CPAP,but currently not smoking, no longer using CPAP  Due family training 1/2, She is worried about d/c home if not functionally optimized, falls risk, further functional deterioration etc  Family is open to home dc if patient is functionally optimized or RACHEL if goals not being met  We agreed that therapy will continue for now, decision on d/c disposition will be made at family training on Tue 1/2  At present we are getting GI to review PEG, We will continue monitoring blunding of rt costophrenic angle and resolving ? atelectasis  She was happy with the update  -----------------------------------------------    OUTPATIENT/FOLLOW UP:    Todd Devine MD  Vascular surgery, General surgery  [address] [facility]  [address]  [place] [address]  [phone]    Todd Odom MD  Cardiology, Interventional  [facility]   [address] [facility]  [address]  [facility], [address] [phone]

## 2024-12-31 NOTE — DISCHARGE NOTE PROVIDER - NSDCACTIVITY_GEN_ALL_CORE
Prognosis/Treatment Options
Do not drive or operate machinery/Showering allowed/Do not make important decisions/Stairs allowed/Walking - Indoors allowed/No heavy lifting/straining/Walking - Outdoors allowed/Follow Instructions Provided by your Surgical Team/Activity as tolerated

## 2024-12-31 NOTE — DISCHARGE NOTE PROVIDER - NSDCCPCAREPLAN_GEN_ALL_CORE_FT
PRINCIPAL DISCHARGE DIAGNOSIS  Diagnosis: S/P TAVR (transcatheter aortic valve replacement)  Assessment and Plan of Treatment: You presented to the hospital and underwent a TAVR. You were sent to  acute rehab to help improve your strength and overall function. Please follow up with the following providers listed in this packet for further management. Bring this packet to your follow up appointments.      SECONDARY DISCHARGE DIAGNOSES  Diagnosis: HTN (hypertension)  Assessment and Plan of Treatment: Please continue to take your antihypertensive medications as prescribed. Please follow up with your PCP/Cardiologist for further management.    Diagnosis: Type 2 diabetes mellitus  Assessment and Plan of Treatment: Please continue to take you diabetes medications as prescribed. Follow up with your PCP/Endocrinologist upon discharge.    Diagnosis: Cough  Assessment and Plan of Treatment: Continue your antitussives as prescribed. Continue to use Incentive Spirometry while at home.     PRINCIPAL DISCHARGE DIAGNOSIS  Diagnosis: S/P TAVR (transcatheter aortic valve replacement)  Assessment and Plan of Treatment: You presented to the hospital and underwent a TAVR. You were sent to  acute rehab to help improve your strength and overall function. Please follow up with the following providers listed in this packet for further management. Bring this packet to your follow up appointments.      SECONDARY DISCHARGE DIAGNOSES  Diagnosis: HTN (hypertension)  Assessment and Plan of Treatment: Please continue to take your antihypertensive medications as prescribed. Please follow up with your PCP/Cardiologist for further management.    Diagnosis: Type 2 diabetes mellitus  Assessment and Plan of Treatment: Please continue to take you diabetes medications as prescribed. Follow up with your PCP/Endocrinologist upon discharge.    Diagnosis: Cough  Assessment and Plan of Treatment: Continue your antitussives as prescribed. Continue to use Incentive Spirometry while at home.    Diagnosis: Mediastinal mass  Assessment and Plan of Treatment: You were found to have an incidental mediastinal mass on your CT chest. Please follow up with your outpatient Pulmonologist and repeat CT chest in 3-4 months.

## 2024-12-31 NOTE — DISCHARGE NOTE PROVIDER - CARE PROVIDER_API CALL
Gretchen Tompkins  Physical/Rehab Medicine  [address] [place] [address]  Phone: [phone]  Fax: [phone]  Follow Up Time: 1 month

## 2024-12-31 NOTE — DISCHARGE NOTE PROVIDER - NSDCFUADDAPPT_GEN_ALL_CORE_FT
Please follow up with your PCP as soon as possible.    Please follow up with:  Todd Devine MD  Vascular surgery, General surgery  [address] [facility]  [address]  [place] [address]  [phone]    Todd Odom MD  Cardiology, Interventional  [facility]   [address]  [address]  [facility], [address] [phone]      If you are in need of a PCP or a specialist in your area: contact the [facility] Physician Referral Service at [phone].

## 2024-12-31 NOTE — PROGRESS NOTE ADULT - SUBJECTIVE AND OBJECTIVE BOX
Chief Complaint:  Patient is a 79y old  Female who presents with a chief complaint of Aortic Valve Disease s/p TAVR (31 Dec 2024 10:57)     Patient seen and examined at bedside. No event over night, tolerating oral feed. She likes to have the PEG tube out, discussed with her PEG tube usually pulled after 6 weeks and once PEG tube out & Incase need replacement again will need EGD PEG placement. She express good understanding.       MEDICATIONS:   MEDICATIONS  (STANDING):  albuterol/ipratropium for Nebulization 3 milliLiter(s) Nebulizer every 6 hours  aMIOdarone    Tablet 200 milliGRAM(s) Oral daily  AQUAPHOR (petrolatum Ointment) 1 Application(s) Topical two times a day  aspirin enteric coated 81 milliGRAM(s) Oral daily  buMETAnide 1 milliGRAM(s) Oral daily  clopidogrel Tablet 75 milliGRAM(s) Oral daily  dextrose 5%. 1000 milliLiter(s) (50 mL/Hr) IV Continuous <Continuous>  dextrose 5%. 1000 milliLiter(s) (100 mL/Hr) IV Continuous <Continuous>  dextrose 50% Injectable 25 Gram(s) IV Push once  dextrose 50% Injectable 12.5 Gram(s) IV Push once  dextrose 50% Injectable 25 Gram(s) IV Push once  escitalopram 20 milliGRAM(s) Oral at bedtime  glucagon  Injectable 1 milliGRAM(s) IntraMuscular once  guaiFENesin  milliGRAM(s) Oral every 12 hours  insulin lispro (ADMELOG) corrective regimen sliding scale   SubCutaneous three times a day before meals  insulin lispro (ADMELOG) corrective regimen sliding scale   SubCutaneous at bedtime  linagliptin 5 milliGRAM(s) Oral daily  metFORMIN 500 milliGRAM(s) Oral two times a day with meals  metoprolol tartrate 12.5 milliGRAM(s) Oral two times a day  nystatin Powder 1 Application(s) Topical two times a day  pantoprazole    Tablet 40 milliGRAM(s) Oral before breakfast    MEDICATIONS  (PRN):  acetaminophen     Tablet .. 650 milliGRAM(s) Oral every 6 hours PRN Mild Pain (1 - 3)  benzonatate 100 milliGRAM(s) Oral three times a day PRN Cough  dextrose Oral Gel 15 Gram(s) Oral once PRN Blood Glucose LESS THAN 70 milliGRAM(s)/deciliter  guaiFENesin Oral Liquid (Sugar-Free) 100 milliGRAM(s) Oral every 6 hours PRN Cough  lidocaine 5% Ointment 1 Application(s) Topical three times a day PRN rib pain            DIET:  Diet, Regular:   Consistent Carbohydrate No Snacks  DASH/TLC Sodium & Cholesterol Restricted (12-23-24 @ 10:40) [Active]          ALLERGIES:   Allergies    penicillins (Hives)  Tetanus Immune Globulin (Unknown)  adhesives (Rash)  Typhoid Vaccines (Anaphylaxis)    Intolerances        VITAL SIGNS:   Vital Signs Last 24 Hrs  T(C): 36.9 (31 Dec 2024 07:43), Max: 36.9 (30 Dec 2024 19:40)  T(F): 98.5 (31 Dec 2024 07:43), Max: 98.5 (31 Dec 2024 07:43)  HR: 65 (31 Dec 2024 07:43) (65 - 96)  BP: 148/81 (31 Dec 2024 07:43) (143/82 - 161/81)  BP(mean): --  RR: 18 (31 Dec 2024 07:43) (18 - 18)  SpO2: 95% (31 Dec 2024 07:43) (88% - 95%)    Parameters below as of 31 Dec 2024 07:43  Patient On (Oxygen Delivery Method): room air      I&O's Summary      PHYSICAL EXAM:   GENERAL:  No acute distress  HEENT:  NC/AT, conjunctiva clear, sclera anicteric  CHEST:  No increased effort  HEART:  Regular rate  ABDOMEN:  Soft, non-tender, non-distended, positive bowel sounds, no rebound or guarding  EXTREMITIES: No edema  SKIN:  Warm, dry  NEURO:  Calm, cooperative    LABS:                        10.2   7.37  )-----------( 199      ( 30 Dec 2024 08:35 )             31.7     Hemoglobin: 10.2 g/dL (12-30-24 @ 08:35)    12-30    137  |  102  |  14  ----------------------------<  96  4.5   |  25  |  0.52    Ca    9.2      30 Dec 2024 08:35    TPro  7.5  /  Alb  2.5[L]  /  TBili  0.4  /  DBili  x   /  AST  22  /  ALT  23  /  AlkPhos  160[H]  12-30    LIVER FUNCTIONS - ( 30 Dec 2024 08:35 )  Alb: 2.5 g/dL / Pro: 7.5 g/dL / ALK PHOS: 160 U/L / ALT: 23 U/L / AST: 22 U/L / GGT: x                                                     RADIOLOGY & ADDITIONAL STUDIES:         GI Follow up    79y old  Female s/p TAVR, PEG placement.  Patient seen and examined at bedside. No event overnight, tolerating oral feeding.   She would like to have the PEG tube out. Had discussion with patient.      MEDICATIONS:   MEDICATIONS  (STANDING):  albuterol/ipratropium for Nebulization 3 milliLiter(s) Nebulizer every 6 hours  aMIOdarone    Tablet 200 milliGRAM(s) Oral daily  AQUAPHOR (petrolatum Ointment) 1 Application(s) Topical two times a day  aspirin enteric coated 81 milliGRAM(s) Oral daily  buMETAnide 1 milliGRAM(s) Oral daily  clopidogrel Tablet 75 milliGRAM(s) Oral daily  dextrose 5%. 1000 milliLiter(s) (50 mL/Hr) IV Continuous <Continuous>  dextrose 5%. 1000 milliLiter(s) (100 mL/Hr) IV Continuous <Continuous>  dextrose 50% Injectable 25 Gram(s) IV Push once  dextrose 50% Injectable 12.5 Gram(s) IV Push once  dextrose 50% Injectable 25 Gram(s) IV Push once  escitalopram 20 milliGRAM(s) Oral at bedtime  glucagon  Injectable 1 milliGRAM(s) IntraMuscular once  guaiFENesin  milliGRAM(s) Oral every 12 hours  insulin lispro (ADMELOG) corrective regimen sliding scale   SubCutaneous three times a day before meals  insulin lispro (ADMELOG) corrective regimen sliding scale   SubCutaneous at bedtime  linagliptin 5 milliGRAM(s) Oral daily  metFORMIN 500 milliGRAM(s) Oral two times a day with meals  metoprolol tartrate 12.5 milliGRAM(s) Oral two times a day  nystatin Powder 1 Application(s) Topical two times a day  pantoprazole    Tablet 40 milliGRAM(s) Oral before breakfast    MEDICATIONS  (PRN):  acetaminophen     Tablet .. 650 milliGRAM(s) Oral every 6 hours PRN Mild Pain (1 - 3)  benzonatate 100 milliGRAM(s) Oral three times a day PRN Cough  dextrose Oral Gel 15 Gram(s) Oral once PRN Blood Glucose LESS THAN 70 milliGRAM(s)/deciliter  guaiFENesin Oral Liquid (Sugar-Free) 100 milliGRAM(s) Oral every 6 hours PRN Cough  lidocaine 5% Ointment 1 Application(s) Topical three times a day PRN rib pain            DIET:  Diet, Regular:   Consistent Carbohydrate No Snacks  DASH/TLC Sodium & Cholesterol Restricted (12-23-24 @ 10:40) [Active]          ALLERGIES:   Allergies    penicillins (Hives)  Tetanus Immune Globulin (Unknown)  adhesives (Rash)  Typhoid Vaccines (Anaphylaxis)    Intolerances        VITAL SIGNS:   Vital Signs Last 24 Hrs  T(C): 36.9 (31 Dec 2024 07:43), Max: 36.9 (30 Dec 2024 19:40)  T(F): 98.5 (31 Dec 2024 07:43), Max: 98.5 (31 Dec 2024 07:43)  HR: 65 (31 Dec 2024 07:43) (65 - 96)  BP: 148/81 (31 Dec 2024 07:43) (143/82 - 161/81)  BP(mean): --  RR: 18 (31 Dec 2024 07:43) (18 - 18)  SpO2: 95% (31 Dec 2024 07:43) (88% - 95%)    Parameters below as of 31 Dec 2024 07:43  Patient On (Oxygen Delivery Method): room air      I&O's Summary      PHYSICAL EXAM:   GENERAL:  No acute distress  HEENT:  NC/AT, conjunctiva clear, sclera anicteric  CHEST:  No increased effort  HEART:  Regular rate  ABDOMEN:  Soft, non-tender, non-distended, positive bowel sounds, no rebound or guarding  EXTREMITIES: No edema  SKIN:  Warm, dry  NEURO:  Calm, cooperative        LABS:                        10.2   7.37  )-----------( 199      ( 30 Dec 2024 08:35 )             31.7     Hemoglobin: 10.2 g/dL (12-30-24 @ 08:35)        12-30    137  |  102  |  14  ----------------------------<  96  4.5   |  25  |  0.52    Ca    9.2      30 Dec 2024 08:35    TPro  7.5  /  Alb  2.5[L]  /  TBili  0.4  /  DBili  x   /  AST  22  /  ALT  23  /  AlkPhos  160[H]  12-30    LIVER FUNCTIONS - ( 30 Dec 2024 08:35 )  Alb: 2.5 g/dL / Pro: 7.5 g/dL / ALK PHOS: 160 U/L / ALT: 23 U/L / AST: 22 U/L / GGT: x

## 2024-12-31 NOTE — PROGRESS NOTE ADULT - PROBLEM SELECTOR PLAN 1
Peg placed 12/03.   - Follow up appointment as outpatient to remove PEG after 1/07 (5 weeks).  - If still admitted, may re- assess removal of PEG some time next week   - Hold Plavix day before peg removal. Peg placed 12/03.  Currently PEG is not being used as she is tolerating oral feeds. Patient educated on ideal PEG removal time frame, as the tube was placed 12/03, recommend removal after 5 weeks. Patient verbalized understanding.    If DC'd home, can follow up in the office as outpatient to remove PEG after 1/07 (5 weeks).  If still admitted, may re- assess removal of PEG next week   Need to hold Plavix 1 day before PEG removal.

## 2024-12-31 NOTE — DISCHARGE NOTE PROVIDER - NSDCMRMEDTOKEN_GEN_ALL_CORE_FT
acetaminophen 325 mg oral tablet: 2 tab(s) orally every 6 hours As needed Mild Pain (1 - 3)  amiodarone 200 mg oral tablet: 1 tab(s) orally once a day  aspirin 81 mg oral delayed release tablet: 1 tab(s) orally once a day  benzonatate 100 mg oral capsule: 1 cap(s) orally 3 times a day As needed Cough  bumetanide 1 mg oral tablet: 1 tab(s) orally once a day  clopidogrel 75 mg oral tablet: 1 tab(s) orally once a day  escitalopram 20 mg oral tablet: 1 tab(s) orally once a day (at bedtime)  guaiFENesin 100 mg/5 mL oral liquid: 5 milliliter(s) orally every 6 hours As needed Cough  guaiFENesin 600 mg oral tablet, extended release: 1 tab(s) orally every 12 hours  ipratropium-albuterol 0.5 mg-2.5 mg/3 mL inhalation solution: 3 milliliter(s) inhaled every 6 hours  linagliptin 5 mg oral tablet: 1 tab(s) orally once a day  metFORMIN 500 mg oral tablet: 1 tab(s) orally 2 times a day (with meals)  metoprolol: 12.5 milligram(s) orally 2 times a day  nystatin 100,000 units/g topical powder: 1 Apply topically to affected area 2 times a day  pantoprazole 40 mg oral delayed release tablet: 1 tab(s) orally once a day (before a meal)  petrolatum topical ointment: 1 Apply topically to affected area 2 times a day   acetaminophen 325 mg oral tablet: 2 tab(s) orally every 6 hours As needed Mild Pain (1 - 3)  Albuterol (Eqv-ProAir HFA) 90 mcg/inh inhalation aerosol: 2 puff(s) inhaled every 6 hours as needed for  shortness of breath and/or wheezing  amiodarone 200 mg oral tablet: 1 tab(s) orally once a day  aspirin 81 mg oral delayed release tablet: 1 tab(s) orally once a day  benzonatate 100 mg oral capsule: 1 cap(s) orally 3 times a day as needed for Cough  bumetanide 1 mg oral tablet: 1 tab(s) orally once a day  clopidogrel 75 mg oral tablet: 1 tab(s) orally once a day  escitalopram 20 mg oral tablet: 1 tab(s) orally once a day (at bedtime) MDD: 1  escitalopram 20 mg oral tablet: 1 tab(s) orally once a day (at bedtime)  fluticasone-salmeterol 250 mcg-50 mcg inhalation powder: 2 puff(s) inhaled 2 times a day  guaiFENesin 100 mg/5 mL oral liquid: 5 milliliter(s) orally every 6 hours as needed for Cough  guaiFENesin 600 mg oral tablet, extended release: 1 tab(s) orally every 12 hours  hydrocodone-homatropine 5 mg-1.5 mg/5 mL oral syrup: 5 milliliter(s) orally 2 times a day MDD: 10mL  linagliptin 5 mg oral tablet: 1 tab(s) orally once a day  metFORMIN 500 mg oral tablet: 1 tab(s) orally 2 times a day (with meals)  Metoprolol Tartrate 25 mg oral tablet: 0.5 tab(s) orally once a day  nystatin 100,000 units/g topical powder: Apply topically to affected area 2 times a day 1 Apply topically to affected area 2 times a day  pantoprazole 40 mg oral delayed release tablet: 1 tab(s) orally once a day (before a meal)  petrolatum topical ointment: 1 Apply topically to affected area 2 times a day  tiotropium 2.5 mcg/inh inhalation aerosol: 2 puff(s) inhaled once a day

## 2024-12-31 NOTE — PROGRESS NOTE ADULT - NS ATTEND AMEND GEN_ALL_CORE FT
I have made amendments to the documentation where necessary. Additional comments: I have personally seen and examined the patient independently Medical records reviewed. I have made amendments to the documentation where necessary and adjusted the history, physical examination, and plan as documented by the NP.     Reports good night rest, tolerating oral diet   Making sustained progress in therapy   Due family training 1/2    Improved breathing     Left groin wound is healing, images reviewed, healthy base    Continue therapy   Serial CXR as planned  F/u Gi review prior to dc to consider PEG removal   Discharge planning       Spent 52 mins, patient review, discussion of specialty consult recs  care co ordination and liaison with family

## 2025-01-01 LAB
GLUCOSE BLDC GLUCOMTR-MCNC: 102 MG/DL — HIGH (ref 70–99)
GLUCOSE BLDC GLUCOMTR-MCNC: 115 MG/DL — HIGH (ref 70–99)
GLUCOSE BLDC GLUCOMTR-MCNC: 168 MG/DL — HIGH (ref 70–99)
GLUCOSE BLDC GLUCOMTR-MCNC: 82 MG/DL — SIGNIFICANT CHANGE UP (ref 70–99)

## 2025-01-01 PROCEDURE — 99232 SBSQ HOSP IP/OBS MODERATE 35: CPT

## 2025-01-01 RX ADMIN — CLOPIDOGREL BISULFATE 75 MILLIGRAM(S): 75 TABLET, FILM COATED ORAL at 11:35

## 2025-01-01 RX ADMIN — PANTOPRAZOLE 40 MILLIGRAM(S): 40 TABLET, DELAYED RELEASE ORAL at 06:06

## 2025-01-01 RX ADMIN — Medication 12.5 MILLIGRAM(S): at 06:04

## 2025-01-01 RX ADMIN — NYSTATIN TOPICAL POWDER 1 APPLICATION(S): 100000 POWDER TOPICAL at 17:34

## 2025-01-01 RX ADMIN — Medication 600 MILLIGRAM(S): at 17:33

## 2025-01-01 RX ADMIN — METFORMIN 500 MILLIGRAM(S): 850 TABLET ORAL at 08:11

## 2025-01-01 RX ADMIN — Medication 100 MILLIGRAM(S): at 23:18

## 2025-01-01 RX ADMIN — Medication 2: at 08:11

## 2025-01-01 RX ADMIN — Medication 12.5 MILLIGRAM(S): at 17:32

## 2025-01-01 RX ADMIN — Medication 81 MILLIGRAM(S): at 11:35

## 2025-01-01 RX ADMIN — AMIODARONE HYDROCHLORIDE 200 MILLIGRAM(S): 200 TABLET ORAL at 06:05

## 2025-01-01 RX ADMIN — IPRATROPIUM BROMIDE AND ALBUTEROL SULFATE 3 MILLILITER(S): .5; 2.5 SOLUTION RESPIRATORY (INHALATION) at 15:59

## 2025-01-01 RX ADMIN — ESCITALOPRAM OXALATE 20 MILLIGRAM(S): 10 TABLET ORAL at 21:26

## 2025-01-01 RX ADMIN — METFORMIN 500 MILLIGRAM(S): 850 TABLET ORAL at 17:33

## 2025-01-01 RX ADMIN — LINAGLIPTIN 5 MILLIGRAM(S): 5 TABLET, FILM COATED ORAL at 11:35

## 2025-01-01 RX ADMIN — Medication 100 MILLIGRAM(S): at 21:28

## 2025-01-01 RX ADMIN — Medication 600 MILLIGRAM(S): at 06:05

## 2025-01-01 RX ADMIN — IPRATROPIUM BROMIDE AND ALBUTEROL SULFATE 3 MILLILITER(S): .5; 2.5 SOLUTION RESPIRATORY (INHALATION) at 20:13

## 2025-01-01 NOTE — PROGRESS NOTE ADULT - ASSESSMENT
Imp: Patient with diagnosis of Aortic Valve Disease s/p TAVR admitted for comprehensive acute rehabilitation.    Plan:  - Continue current multidisciplinary rehab program  - DVT prophylaxis  - Skin- Turn q2h, check skin daily  - Continue current medications; patient medically stable.   -Active issues-     ##PEG - review of notes from GI shows plan is remove PEG 1/7/25 (5 weeks after it was placed). This will be done either during the admission, or in an outpatient setting with GI. D/w pt, but needed reiteration of plan.    - Patient is stable to continue current rehabilitation program.

## 2025-01-01 NOTE — PROGRESS NOTE ADULT - SUBJECTIVE AND OBJECTIVE BOX
Cc: Impaired mobility, function, and ADLs secondary to Aortic Valve Disease s/p TAVR    HPI: Patient seen and examined at bedside. No acute events overnight. Wants to know why she still has a PEG if she doesn't use it and has not needed it.  Pain controlled, no chest pain, no N/V, no Fevers/Chills. No other new ROS  Has been tolerating rehabilitation program.    Medications:  MEDICATIONS  (STANDING):  albuterol/ipratropium for Nebulization 3 milliLiter(s) Nebulizer every 6 hours  aMIOdarone    Tablet 200 milliGRAM(s) Oral daily  AQUAPHOR (petrolatum Ointment) 1 Application(s) Topical two times a day  aspirin enteric coated 81 milliGRAM(s) Oral daily  buMETAnide 1 milliGRAM(s) Oral daily  clopidogrel Tablet 75 milliGRAM(s) Oral daily  dextrose 5%. 1000 milliLiter(s) (50 mL/Hr) IV Continuous <Continuous>  dextrose 5%. 1000 milliLiter(s) (100 mL/Hr) IV Continuous <Continuous>  dextrose 50% Injectable 25 Gram(s) IV Push once  dextrose 50% Injectable 12.5 Gram(s) IV Push once  dextrose 50% Injectable 25 Gram(s) IV Push once  escitalopram 20 milliGRAM(s) Oral at bedtime  glucagon  Injectable 1 milliGRAM(s) IntraMuscular once  guaiFENesin  milliGRAM(s) Oral every 12 hours  insulin lispro (ADMELOG) corrective regimen sliding scale   SubCutaneous three times a day before meals  insulin lispro (ADMELOG) corrective regimen sliding scale   SubCutaneous at bedtime  linagliptin 5 milliGRAM(s) Oral daily  metFORMIN 500 milliGRAM(s) Oral two times a day with meals  metoprolol tartrate 12.5 milliGRAM(s) Oral two times a day  nystatin Powder 1 Application(s) Topical two times a day  pantoprazole    Tablet 40 milliGRAM(s) Oral before breakfast    MEDICATIONS  (PRN):  acetaminophen     Tablet .. 650 milliGRAM(s) Oral every 6 hours PRN Mild Pain (1 - 3)  benzonatate 100 milliGRAM(s) Oral three times a day PRN Cough  dextrose Oral Gel 15 Gram(s) Oral once PRN Blood Glucose LESS THAN 70 milliGRAM(s)/deciliter  guaiFENesin Oral Liquid (Sugar-Free) 100 milliGRAM(s) Oral every 6 hours PRN Cough  lidocaine 5% Ointment 1 Application(s) Topical three times a day PRN rib pain      Vital Signs:  T(C): 36.8 (01-01-25 @ 08:08), Max: 36.8 (01-01-25 @ 08:08)  HR: 69 (01-01-25 @ 08:08) (69 - 81)  BP: 143/81 (01-01-25 @ 08:08) (113/57 - 158/67)  RR: 16 (01-01-25 @ 08:08) (16 - 16)  SpO2: 94% (01-01-25 @ 08:08) (94% - 95%)    GEN - In NAD  HEENT- EOMI  Heart- RRR, S1S2  Lungs- CTA bl.  Abd- + BS, NT, +PEG  Ext- No calf pain  Neuro/MSK- Exam unchanged    Trended Labs:  12-30    137  |  102  |  14  ----------------------------<  96  4.5   |  25  |  0.52    Ca    9.2      30 Dec 2024 08:35    TPro  7.5  /  Alb  2.5[L]  /  TBili  0.4  /  DBili  x   /  AST  22  /  ALT  23  /  AlkPhos  160[H]  12-30                                              10.2   7.37  )-----------( 199      ( 30 Dec 2024 08:35 )             31.7     CAPILLARY BLOOD GLUCOSE      POCT Blood Glucose.: 82 mg/dL (01 Jan 2025 11:31)  POCT Blood Glucose.: 168 mg/dL (01 Jan 2025 07:58)  POCT Blood Glucose.: 123 mg/dL (31 Dec 2024 21:11)  POCT Blood Glucose.: 130 mg/dL (31 Dec 2024 17:04)          I&O's Detail:        Images Reviewed:    Notes Reviewed: GI note 12/31/24 --  PEG can be removed 01/07/2025 (5 weeks s/p placement)

## 2025-01-01 NOTE — PROGRESS NOTE ADULT - NS ATTEST RISK PROBLEM GEN_ALL_CORE FT
Moderate number or complexity of illness, moderate amount/complexity of data reviewed, and moderate risk of complications/morbidity from additional treatment.

## 2025-01-01 NOTE — PROGRESS NOTE ADULT - ASSESSMENT
79 year old, obese, female with PMH of DAYANA (on CPAP), aortic stenosis, HTN, HLD, current smoker, COPD, Type 2 Diabetes Mellitus, and non-alcoholic fatty liver disease; who presented to [facility] on 11/20 for a scheduled TAVR, and possible stent placement.  She reports several month history of DUFF while walking and climbing up stairs, fatigue and BL LE edema. On 11/20 she underwent TAVR, drainage of retroperitoneal hematoma and pericardial window with Dr. Garland and Noah. Intraoperatively, she had an episode of Vtach (on Amiodarone).      Her hospital course was complicated by the following: acute hypoxic respiratory failure (requiring BiPAP and eventual intubation on 11/25), hemorrhagic shock, AFIB w/ RVR, coffee ground emesis, Code Blue (s/p CPR resulting in rib fractures), AIDA, fever secondary to aspiration PNA, +pseudomonas in sputum, persistent leukocytosis and low grade fevers (on Vanco per ID), L lateral abdominal wall hematoma, distended gallbladder and gallbladder sludge, dysphagia (s/p trache and PEG placement on 12/3), decannulation (12/13), L groin delayed wound healing (required wound vac), and hyperglycemia.  Patient now admitted for a multidisciplinary rehab program. 12-19-24 @ 13:25      #Aortic stenosis S/p TAVR  - Comprehensive Rehab Program of PT/OT/SLP  - Plavix 75mg daily     #HTN  #AFIB w/ RVR  - Amiodarone 200mg daily   - continue Bumex 1 mg daily. Patient was encouraged to continue diuretics to avoid going into CHF.  - Metoprolol 12.5mg BID   - No therapeutic AC as pt had mass tranfusion protocol at [facility]  - dvt ppx    #Type 2 Diabetes Mellitus  - A1c: 6.4% (Dec 2024)    - FS AC & HS  - Mod ISS  - D/C lantus and change to PO meds prior to discharge. Unable to start Glimepiride as not on formulary. Will start Tradjenta and metformin 500 mg bid  - Monitor glucose and supplement with ISS  - adjust meds as needed    #DAYANA  #COPD  - Duo-neb q 6hrs  - outpt pft studies and further evaluation    #Acute Hypoxic Respiratory Failures  - multifactorial, resolved  - decannulated 12/3  - PRN: Nebulizer QID   - Mucinex  - Chest PT  - serial CXR    #Mood  - Escitalopram 20mg daily     #Dysphagia  - tolerating oral feeds,   - PEG to continue in situ. GI following for possible removal this week    #GI/Bowel Mgmt   - Pantoprazole  - Senna , Miralax     # GI ppx: Pantoprazole  # DVT: lovenox, TEDs

## 2025-01-01 NOTE — PROGRESS NOTE ADULT - SUBJECTIVE AND OBJECTIVE BOX
PROGRESS NOTE:     Patient is a 79y old  Female who presents with a chief complaint of Aortic Valve Disease s/p TAVR (30 Dec 2024 13:20)          SUBJECTIVE & OBJECTIVE:   Pt seen and examined at bedside in AM    no overnight events.     REVIEW OF SYSTEMS: remaining ROS negative     PHYSICAL EXAM:  VITALS:  Vital Signs Last 24 Hrs  T(C): 36.8 (01 Jan 2025 08:08), Max: 36.8 (01 Jan 2025 08:08)  T(F): 98.2 (01 Jan 2025 08:08), Max: 98.2 (01 Jan 2025 08:08)  HR: 69 (01 Jan 2025 08:08) (69 - 81)  BP: 143/81 (01 Jan 2025 08:08) (113/57 - 158/67)  BP(mean): --  RR: 16 (01 Jan 2025 08:08) (16 - 16)  SpO2: 94% (01 Jan 2025 08:08) (94% - 95%)    Parameters below as of 01 Jan 2025 08:08  Patient On (Oxygen Delivery Method): room air          GENERAL: NAD,  no increased WOB  HEAD:  Atraumatic, Normocephalic  EYES: EOMI, conjunctiva and sclera clear  ENMT: Moist mucous membranes  NECK: Supple, No JVD  NERVOUS SYSTEM:  Alert & Oriented   CHEST/LUNG: Clear to auscultation bilaterally; No rales, rhonchi, wheezing  HEART: Regular rate and rhythm  ABDOMEN: Soft, Nontender, Nondistended; Bowel sounds present  EXTREMITIES: No clubbing, cyanosis, calf tenderness      MEDICATIONS  (STANDING):  albuterol/ipratropium for Nebulization 3 milliLiter(s) Nebulizer every 6 hours  aMIOdarone    Tablet 200 milliGRAM(s) Oral daily  AQUAPHOR (petrolatum Ointment) 1 Application(s) Topical two times a day  aspirin enteric coated 81 milliGRAM(s) Oral daily  buMETAnide 1 milliGRAM(s) Oral daily  clopidogrel Tablet 75 milliGRAM(s) Oral daily  dextrose 5%. 1000 milliLiter(s) (50 mL/Hr) IV Continuous <Continuous>  dextrose 5%. 1000 milliLiter(s) (100 mL/Hr) IV Continuous <Continuous>  dextrose 50% Injectable 25 Gram(s) IV Push once  dextrose 50% Injectable 12.5 Gram(s) IV Push once  dextrose 50% Injectable 25 Gram(s) IV Push once  escitalopram 20 milliGRAM(s) Oral at bedtime  glucagon  Injectable 1 milliGRAM(s) IntraMuscular once  guaiFENesin  milliGRAM(s) Oral every 12 hours  insulin lispro (ADMELOG) corrective regimen sliding scale   SubCutaneous three times a day before meals  insulin lispro (ADMELOG) corrective regimen sliding scale   SubCutaneous at bedtime  linagliptin 5 milliGRAM(s) Oral daily  metFORMIN 500 milliGRAM(s) Oral two times a day with meals  metoprolol tartrate 12.5 milliGRAM(s) Oral two times a day  nystatin Powder 1 Application(s) Topical two times a day  pantoprazole    Tablet 40 milliGRAM(s) Oral before breakfast    MEDICATIONS  (PRN):  acetaminophen     Tablet .. 650 milliGRAM(s) Oral every 6 hours PRN Mild Pain (1 - 3)  benzonatate 100 milliGRAM(s) Oral three times a day PRN Cough  dextrose Oral Gel 15 Gram(s) Oral once PRN Blood Glucose LESS THAN 70 milliGRAM(s)/deciliter  guaiFENesin Oral Liquid (Sugar-Free) 100 milliGRAM(s) Oral every 6 hours PRN Cough  lidocaine 5% Ointment 1 Application(s) Topical three times a day PRN rib pain        Allergies    penicillins (Hives)  Tetanus Immune Globulin (Unknown)  adhesives (Rash)  Typhoid Vaccines (Anaphylaxis)    Intolerances              LABS:                           10.2   7.37  )-----------( 199      ( 30 Dec 2024 08:35 )             31.7     12-30    137  |  102  |  14  ----------------------------<  96  4.5   |  25  |  0.52    Ca    9.2      30 Dec 2024 08:35    TPro  7.5  /  Alb  2.5[L]  /  TBili  0.4  /  DBili  x   /  AST  22  /  ALT  23  /  AlkPhos  160[H]  12-30      Urinalysis Basic - ( 30 Dec 2024 08:35 )    Color: x / Appearance: x / SG: x / pH: x  Gluc: 96 mg/dL / Ketone: x  / Bili: x / Urobili: x   Blood: x / Protein: x / Nitrite: x   Leuk Esterase: x / RBC: x / WBC x   Sq Epi: x / Non Sq Epi: x / Bacteria: x      CAPILLARY BLOOD GLUCOSE      POCT Blood Glucose.: 168 mg/dL (01 Jan 2025 07:58)  POCT Blood Glucose.: 123 mg/dL (31 Dec 2024 21:11)  POCT Blood Glucose.: 130 mg/dL (31 Dec 2024 17:04)  POCT Blood Glucose.: 101 mg/dL (31 Dec 2024 11:52)              RECENT CULTURES:          RADIOLOGY & ADDITIONAL TESTS:

## 2025-01-02 ENCOUNTER — TRANSCRIPTION ENCOUNTER (OUTPATIENT)
Age: 80
End: 2025-01-02

## 2025-01-02 LAB
ALBUMIN SERPL ELPH-MCNC: 2.2 G/DL — LOW (ref 3.3–5)
ALP SERPL-CCNC: 126 U/L — HIGH (ref 40–120)
ALT FLD-CCNC: 17 U/L — SIGNIFICANT CHANGE UP (ref 10–45)
ANION GAP SERPL CALC-SCNC: 7 MMOL/L — SIGNIFICANT CHANGE UP (ref 5–17)
APPEARANCE UR: CLEAR — SIGNIFICANT CHANGE UP
AST SERPL-CCNC: 16 U/L — SIGNIFICANT CHANGE UP (ref 10–40)
BASOPHILS # BLD AUTO: 0.02 K/UL — SIGNIFICANT CHANGE UP (ref 0–0.2)
BASOPHILS NFR BLD AUTO: 0.3 % — SIGNIFICANT CHANGE UP (ref 0–2)
BILIRUB SERPL-MCNC: 0.2 MG/DL — SIGNIFICANT CHANGE UP (ref 0.2–1.2)
BILIRUB UR-MCNC: NEGATIVE — SIGNIFICANT CHANGE UP
BUN SERPL-MCNC: 10 MG/DL — SIGNIFICANT CHANGE UP (ref 7–23)
CALCIUM SERPL-MCNC: 9 MG/DL — SIGNIFICANT CHANGE UP (ref 8.4–10.5)
CHLORIDE SERPL-SCNC: 105 MMOL/L — SIGNIFICANT CHANGE UP (ref 96–108)
CO2 SERPL-SCNC: 29 MMOL/L — SIGNIFICANT CHANGE UP (ref 22–31)
COLOR SPEC: YELLOW — SIGNIFICANT CHANGE UP
CREAT SERPL-MCNC: 0.57 MG/DL — SIGNIFICANT CHANGE UP (ref 0.5–1.3)
DIFF PNL FLD: NEGATIVE — SIGNIFICANT CHANGE UP
EGFR: 93 ML/MIN/1.73M2 — SIGNIFICANT CHANGE UP
EOSINOPHIL # BLD AUTO: 0.05 K/UL — SIGNIFICANT CHANGE UP (ref 0–0.5)
EOSINOPHIL NFR BLD AUTO: 0.7 % — SIGNIFICANT CHANGE UP (ref 0–6)
FLUAV AG NPH QL: SIGNIFICANT CHANGE UP
FLUBV AG NPH QL: SIGNIFICANT CHANGE UP
GLUCOSE BLDC GLUCOMTR-MCNC: 105 MG/DL — HIGH (ref 70–99)
GLUCOSE BLDC GLUCOMTR-MCNC: 109 MG/DL — HIGH (ref 70–99)
GLUCOSE BLDC GLUCOMTR-MCNC: 129 MG/DL — HIGH (ref 70–99)
GLUCOSE BLDC GLUCOMTR-MCNC: 132 MG/DL — HIGH (ref 70–99)
GLUCOSE SERPL-MCNC: 101 MG/DL — HIGH (ref 70–99)
GLUCOSE UR QL: NEGATIVE MG/DL — SIGNIFICANT CHANGE UP
HCT VFR BLD CALC: 28.6 % — LOW (ref 34.5–45)
HGB BLD-MCNC: 9 G/DL — LOW (ref 11.5–15.5)
IMM GRANULOCYTES NFR BLD AUTO: 2.1 % — HIGH (ref 0–0.9)
KETONES UR-MCNC: NEGATIVE MG/DL — SIGNIFICANT CHANGE UP
LEUKOCYTE ESTERASE UR-ACNC: NEGATIVE — SIGNIFICANT CHANGE UP
LYMPHOCYTES # BLD AUTO: 0.85 K/UL — LOW (ref 1–3.3)
LYMPHOCYTES # BLD AUTO: 12.6 % — LOW (ref 13–44)
MCHC RBC-ENTMCNC: 31.4 PG — SIGNIFICANT CHANGE UP (ref 27–34)
MCHC RBC-ENTMCNC: 31.5 G/DL — LOW (ref 32–36)
MCV RBC AUTO: 99.7 FL — SIGNIFICANT CHANGE UP (ref 80–100)
MONOCYTES # BLD AUTO: 0.6 K/UL — SIGNIFICANT CHANGE UP (ref 0–0.9)
MONOCYTES NFR BLD AUTO: 8.9 % — SIGNIFICANT CHANGE UP (ref 2–14)
NEUTROPHILS # BLD AUTO: 5.1 K/UL — SIGNIFICANT CHANGE UP (ref 1.8–7.4)
NEUTROPHILS NFR BLD AUTO: 75.4 % — SIGNIFICANT CHANGE UP (ref 43–77)
NITRITE UR-MCNC: NEGATIVE — SIGNIFICANT CHANGE UP
NRBC # BLD: 0 /100 WBCS — SIGNIFICANT CHANGE UP (ref 0–0)
PH UR: 6 — SIGNIFICANT CHANGE UP (ref 5–8)
PLATELET # BLD AUTO: 217 K/UL — SIGNIFICANT CHANGE UP (ref 150–400)
POTASSIUM SERPL-MCNC: 4.5 MMOL/L — SIGNIFICANT CHANGE UP (ref 3.5–5.3)
POTASSIUM SERPL-SCNC: 4.5 MMOL/L — SIGNIFICANT CHANGE UP (ref 3.5–5.3)
PROT SERPL-MCNC: 6.5 G/DL — SIGNIFICANT CHANGE UP (ref 6–8.3)
PROT UR-MCNC: SIGNIFICANT CHANGE UP MG/DL
RBC # BLD: 2.87 M/UL — LOW (ref 3.8–5.2)
RBC # FLD: 18.1 % — HIGH (ref 10.3–14.5)
RSV RNA NPH QL NAA+NON-PROBE: SIGNIFICANT CHANGE UP
SARS-COV-2 RNA SPEC QL NAA+PROBE: SIGNIFICANT CHANGE UP
SODIUM SERPL-SCNC: 141 MMOL/L — SIGNIFICANT CHANGE UP (ref 135–145)
SP GR SPEC: 1.01 — SIGNIFICANT CHANGE UP (ref 1–1.03)
UROBILINOGEN FLD QL: 0.2 MG/DL — SIGNIFICANT CHANGE UP (ref 0.2–1)
WBC # BLD: 6.76 K/UL — SIGNIFICANT CHANGE UP (ref 3.8–10.5)
WBC # FLD AUTO: 6.76 K/UL — SIGNIFICANT CHANGE UP (ref 3.8–10.5)

## 2025-01-02 PROCEDURE — 71045 X-RAY EXAM CHEST 1 VIEW: CPT | Mod: 26

## 2025-01-02 PROCEDURE — 71250 CT THORAX DX C-: CPT | Mod: 26

## 2025-01-02 PROCEDURE — 99233 SBSQ HOSP IP/OBS HIGH 50: CPT

## 2025-01-02 RX ORDER — IPRATROPIUM BROMIDE AND ALBUTEROL SULFATE .5; 2.5 MG/3ML; MG/3ML
3 SOLUTION RESPIRATORY (INHALATION) EVERY 6 HOURS
Refills: 0 | Status: DISCONTINUED | OUTPATIENT
Start: 2025-01-02 | End: 2025-01-02

## 2025-01-02 RX ORDER — TIOTROPIUM BROMIDE MONOHYDRATE 18 UG/1
2 CAPSULE ORAL; RESPIRATORY (INHALATION) DAILY
Refills: 0 | Status: DISCONTINUED | OUTPATIENT
Start: 2025-01-02 | End: 2025-01-07

## 2025-01-02 RX ORDER — FLUTICASONE PROPIONATE AND SALMETEROL 50; 500 UG/1; UG/1
1 POWDER ORAL; RESPIRATORY (INHALATION)
Refills: 0 | Status: DISCONTINUED | OUTPATIENT
Start: 2025-01-02 | End: 2025-01-07

## 2025-01-02 RX ADMIN — TIOTROPIUM BROMIDE MONOHYDRATE 2 PUFF(S): 18 CAPSULE ORAL; RESPIRATORY (INHALATION) at 21:29

## 2025-01-02 RX ADMIN — IPRATROPIUM BROMIDE AND ALBUTEROL SULFATE 3 MILLILITER(S): .5; 2.5 SOLUTION RESPIRATORY (INHALATION) at 08:02

## 2025-01-02 RX ADMIN — IPRATROPIUM BROMIDE AND ALBUTEROL SULFATE 3 MILLILITER(S): .5; 2.5 SOLUTION RESPIRATORY (INHALATION) at 02:00

## 2025-01-02 RX ADMIN — BUMETANIDE 1 MILLIGRAM(S): 2 TABLET ORAL at 06:25

## 2025-01-02 RX ADMIN — METFORMIN 500 MILLIGRAM(S): 850 TABLET ORAL at 07:59

## 2025-01-02 RX ADMIN — ACETAMINOPHEN 650 MILLIGRAM(S): 80 SOLUTION/ DROPS ORAL at 08:59

## 2025-01-02 RX ADMIN — PANTOPRAZOLE 40 MILLIGRAM(S): 40 TABLET, DELAYED RELEASE ORAL at 06:25

## 2025-01-02 RX ADMIN — Medication 600 MILLIGRAM(S): at 06:25

## 2025-01-02 RX ADMIN — AMIODARONE HYDROCHLORIDE 200 MILLIGRAM(S): 200 TABLET ORAL at 06:25

## 2025-01-02 RX ADMIN — ACETAMINOPHEN 650 MILLIGRAM(S): 80 SOLUTION/ DROPS ORAL at 07:59

## 2025-01-02 RX ADMIN — METFORMIN 500 MILLIGRAM(S): 850 TABLET ORAL at 17:50

## 2025-01-02 RX ADMIN — ESCITALOPRAM OXALATE 20 MILLIGRAM(S): 10 TABLET ORAL at 21:11

## 2025-01-02 RX ADMIN — LINAGLIPTIN 5 MILLIGRAM(S): 5 TABLET, FILM COATED ORAL at 11:49

## 2025-01-02 RX ADMIN — IPRATROPIUM BROMIDE AND ALBUTEROL SULFATE 3 MILLILITER(S): .5; 2.5 SOLUTION RESPIRATORY (INHALATION) at 16:24

## 2025-01-02 RX ADMIN — Medication 12.5 MILLIGRAM(S): at 06:27

## 2025-01-02 RX ADMIN — Medication 81 MILLIGRAM(S): at 11:49

## 2025-01-02 RX ADMIN — Medication 1 APPLICATION(S): at 06:22

## 2025-01-02 RX ADMIN — IPRATROPIUM BROMIDE AND ALBUTEROL SULFATE 3 MILLILITER(S): .5; 2.5 SOLUTION RESPIRATORY (INHALATION) at 21:16

## 2025-01-02 RX ADMIN — Medication 100 MILLIGRAM(S): at 06:26

## 2025-01-02 RX ADMIN — Medication 100 MILLIGRAM(S): at 23:07

## 2025-01-02 RX ADMIN — Medication 600 MILLIGRAM(S): at 17:50

## 2025-01-02 RX ADMIN — NYSTATIN TOPICAL POWDER 1 APPLICATION(S): 100000 POWDER TOPICAL at 06:22

## 2025-01-02 RX ADMIN — Medication 12.5 MILLIGRAM(S): at 17:50

## 2025-01-02 RX ADMIN — NYSTATIN TOPICAL POWDER 1 APPLICATION(S): 100000 POWDER TOPICAL at 17:52

## 2025-01-02 NOTE — DISCHARGE NOTE NURSING/CASE MANAGEMENT/SOCIAL WORK - NSDCFUADDAPPT_GEN_ALL_CORE_FT
Please follow up with your PCP as soon as possible.  MD: Dr. Eligio Chen   PHONE:  [phone]    Please follow up with:  Todd Devine MD  Vascular surgery, General surgery  [address]  [address]  [place] [address]  [phone]    Todd Odom MD  Cardiology, Interventional  [facility]   [address]  [address]  [place], [address] [phone]      If you are in need of a PCP or a specialist in your area: contact the [facility] Physician Referral Service at [phone].

## 2025-01-02 NOTE — CONSULT NOTE ADULT - SUBJECTIVE AND OBJECTIVE BOX
Time of visit:    CHIEF COMPLAINT: Patient is a 79y old  Female who presents with a chief complaint of Aortic Valve Disease s/p TAVR (02 Jan 2025 09:19)      HPI: This is a 79 year old, obese, woman  DAYANA (on CPAP), aortic stenosis, HTN, HLD, current smoker, COPD, Type 2 Diabetes Mellitus, and non-alcoholic fatty liver disease; who presented to [facility] on 11/20 for a scheduled TAVR, and possible stent placement.  She reports several month history of DUFF while walking and climbing up stairs, fatigue and BL LE edema. On 11/20 she underwent TAVR, drainage of retroperitoneal hematoma and pericardial window with Dr. Garland and Noah. Intraoperatively, she had an episode of Vtach (on Amiodarone).      Her hospital course was complicated by the following: acute hypoxic respiratory failure (requiring BiPAP and eventual intubation on 11/25), hemorrhagic shock, AFIB w/ RVR, coffee ground emesis, Code Blue (s/p CPR resulting in rib fractures), AIDA, fever secondary to aspiration PNA, +pseudomonas in sputum, persistent leukocytosis and low grade fevers (on Vanco per ID), L lateral abdominal wall hematoma, distended gallbladder and gallbladder sludge, dysphagia (s/p trache and PEG placement on 12/3), decannulation (12/13), L groin delayed wound healing (requiring wound vac), and hyperglycemia.     PM&R was consulted and deemed her an appropriate candidate for IRF. She was medically stabilized and cleared for discharge to [facility].  (19 Dec 2024 13:21)   Patient seen and examined.     PAST MEDICAL & SURGICAL HISTORY:  Aortic stenosis      Chronic obstructive pulmonary disease      Type 2 diabetes mellitus      Non-alcoholic fatty liver disease      HLD (hyperlipidemia)      HTN (hypertension)      Obstructive sleep apnea on CPAP          Allergies    penicillins (Hives)  Tetanus Immune Globulin (Unknown)  adhesives (Rash)  Typhoid Vaccines (Anaphylaxis)    Intolerances        MEDICATIONS  (STANDING):  albuterol/ipratropium for Nebulization 3 milliLiter(s) Nebulizer every 6 hours  aMIOdarone    Tablet 200 milliGRAM(s) Oral daily  AQUAPHOR (petrolatum Ointment) 1 Application(s) Topical two times a day  aspirin enteric coated 81 milliGRAM(s) Oral daily  buMETAnide 1 milliGRAM(s) Oral daily  clopidogrel Tablet 75 milliGRAM(s) Oral daily  dextrose 5%. 1000 milliLiter(s) (50 mL/Hr) IV Continuous <Continuous>  dextrose 5%. 1000 milliLiter(s) (100 mL/Hr) IV Continuous <Continuous>  dextrose 50% Injectable 25 Gram(s) IV Push once  dextrose 50% Injectable 12.5 Gram(s) IV Push once  dextrose 50% Injectable 25 Gram(s) IV Push once  escitalopram 20 milliGRAM(s) Oral at bedtime  glucagon  Injectable 1 milliGRAM(s) IntraMuscular once  guaiFENesin  milliGRAM(s) Oral every 12 hours  insulin lispro (ADMELOG) corrective regimen sliding scale   SubCutaneous three times a day before meals  insulin lispro (ADMELOG) corrective regimen sliding scale   SubCutaneous at bedtime  linagliptin 5 milliGRAM(s) Oral daily  metFORMIN 500 milliGRAM(s) Oral two times a day with meals  metoprolol tartrate 12.5 milliGRAM(s) Oral two times a day  nystatin Powder 1 Application(s) Topical two times a day  pantoprazole    Tablet 40 milliGRAM(s) Oral before breakfast      MEDICATIONS  (PRN):  acetaminophen     Tablet .. 650 milliGRAM(s) Oral every 6 hours PRN Mild Pain (1 - 3)  benzonatate 100 milliGRAM(s) Oral three times a day PRN Cough  dextrose Oral Gel 15 Gram(s) Oral once PRN Blood Glucose LESS THAN 70 milliGRAM(s)/deciliter  guaiFENesin Oral Liquid (Sugar-Free) 100 milliGRAM(s) Oral every 6 hours PRN Cough  lidocaine 5% Ointment 1 Application(s) Topical three times a day PRN rib pain   Medications up to date at time of exam.    Medications up to date at time of exam.    FAMILY HISTORY:      SOCIAL HISTORY  Smoking History: [   ] smoking/smoke exposure, [  x ] former smoker  Living Condition: [   ] apartment, [   ] private house  Work History: retired RN   Travel History: denies recent travel  Illicit Substance Use: denies  Alcohol Use: denies    REVIEW OF SYSTEMS:    CONSTITUTIONAL:  denies fevers, chills, sweats, weight loss    HEENT:  denies diplopia or blurred vision, sore throat or runny nose.    CARDIOVASCULAR:  denies pressure, squeezing, tightness, or heaviness about the chest; no palpitations.    RESPIRATORY:  denies SOB, cough, DUFF, wheezing.    GASTROINTESTINAL:  denies abdominal pain, nausea, vomiting or diarrhea.    GENITOURINARY: denies dysuria, frequency or urgency.    NEUROLOGIC:  denies numbness, tingling, seizures or weakness.    PSYCHIATRIC:  denies disorder of thought or mood.    MSK: denies swelling, redness      PHYSICAL EXAMINATION:    GENERAL: The patient  in no apparent distress. sitting in chair     Vital Signs Last 24 Hrs  T(C): 36.7 (02 Jan 2025 07:55), Max: 36.7 (02 Jan 2025 07:55)  T(F): 98 (02 Jan 2025 07:55), Max: 98 (02 Jan 2025 07:55)  HR: 72 (02 Jan 2025 17:46) (62 - 88)  BP: 153/79 (02 Jan 2025 17:46) (129/75 - 153/79)  BP(mean): --  RR: 16 (02 Jan 2025 07:55) (16 - 16)  SpO2: 93% (02 Jan 2025 17:46) (93% - 94%)    Parameters below as of 02 Jan 2025 17:46  Patient On (Oxygen Delivery Method): room air       (if applicable)    Chest Tube (if applicable)    HEENT: Head is normocephalic and atraumatic. Extraocular muscles are intact. Mucous membranes are moist.     NECK: Supple, no palpable adenopathy.    LUNGS: Fair b/l breath sounds, no wheezing, rales, or rhonchi.    HEART: Regular rate and rhythm without murmur.    ABDOMEN: Soft, nontender, and nondistended.  No hepatosplenomegaly is noted. + PEG     RENAL: No difficulty voiding, no pelvic pain    EXTREMITIES: Without any cyanosis, clubbing, rash, lesions or edema.    NEUROLOGIC: Awake, alert, oriented, grossly intact    SKIN: Warm, dry, good turgor.      LABS:                        9.0    6.76  )-----------( 217      ( 02 Jan 2025 07:00 )             28.6     01-02    141  |  105  |  10  ----------------------------<  101[H]  4.5   |  29  |  0.57    Ca    9.0      02 Jan 2025 07:00    TPro  6.5  /  Alb  2.2[L]  /  TBili  0.2  /  DBili  x   /  AST  16  /  ALT  17  /  AlkPhos  126[H]  01-02      Urinalysis Basic - ( 02 Jan 2025 07:00 )    Color: x / Appearance: x / SG: x / pH: x  Gluc: 101 mg/dL / Ketone: x  / Bili: x / Urobili: x   Blood: x / Protein: x / Nitrite: x   Leuk Esterase: x / RBC: x / WBC x   Sq Epi: x / Non Sq Epi: x / Bacteria: x    MICROBIOLOGY: (if applicable)    RADIOLOGY & ADDITIONAL STUDIES:  EKG:   CXR:< from: CT Chest No Cont (01.02.25 @ 11:40) >  Bilateral nodular opacities and bandlike consolidations within the lung   bases possibly infectious in etiology. Recommend follow-up chest CT in   4-8 weeks to determine resolution.    Emphysema.    Indeterminant 0.9 cm anterior mediastinal nodule. Recommend evaluation   with CT chest without and with contrast anterior mediastinal lesion   protocol.    Indeterminant isodense material within gallbladder. Further evaluation   can be performed with sonogram.      < end of copied text >    ECHO:    IMPRESSION: 79y Female PAST MEDICAL & SURGICAL HISTORY:  Aortic stenosis      Chronic obstructive pulmonary disease      Type 2 diabetes mellitus      Non-alcoholic fatty liver disease      HLD (hyperlipidemia)      HTN (hypertension)      Obstructive sleep apnea on CPAP       p/w         IM{P: This is a 79 year old, obese, woman  DAYANA (on CPAP), aortic stenosis, HTN, HLD, former  smoker, COPD, Type 2 Diabetes Mellitus, and non-alcoholic fatty liver disease; who presented to [facility] on 11/20 for a scheduled TAVR, and possible stent placement.  She reports several month history of DUFF while walking and climbing up stairs, fatigue and BL LE edema. On 11/20 she underwent TAVR, drainage of retroperitoneal hematoma and pericardial window with Dr. Garland and Noah. Intraoperatively, she had an episode of Vtach (on Amiodarone).      Her hospital course was complicated by the following: acute hypoxic respiratory failure (requiring BiPAP and eventual intubation on 11/25), hemorrhagic shock, AFIB w/ RVR, coffee ground emesis, Code Blue (s/p CPR resulting in rib fractures), AIDA, fever secondary to aspiration PNA, +pseudomonas in sputum, persistent leukocytosis and low grade fevers (on Vanco per ID), L lateral abdominal wall hematoma, distended gallbladder and gallbladder sludge, dysphagia (s/p trache and PEG placement on 12/3), decannulation (12/13), L groin delayed wound healing (requiring wound vac), and hyperglycemia.     Pulmonary eval for PNA .  CT chest noted .            ASSESSMENT     - Cough / bronchitis   - COPD / Emphysema   - Mediastinal adenopathy   - Former smoker   - hx of Cardiac arrest   - S/P reversal of Trach   - HTN HLD  T2 DM   - Severe AS S/P TAVR       Sugg    - Hold off antibx   - Check viral panel   - Advair 250 / 50 Q12h   - Spiriva QD   - Duoneb q6h   - Hycodan 5ml QAM and PM   - Monitor blood glucose  - Consider removing PEG   - Repeat CT chest in 3-4 months   - Out pat pulmo f/u     
Patient is a 79y old  Female who presents with a chief complaint of Aortic Valve Disease s/p TAVR (19 Dec 2024 13:21)        HPI:  Marysol Drake is a 79 year old, obese, female with PMH of DAYANA (on CPAP), aortic stenosis, HTN, HLD, current smoker, COPD, Type 2 Diabetes Mellitus, and non-alcoholic fatty liver disease; who presented to [facility] on 11/20 for a scheduled TAVR, and possible stent placement.  She reports several month history of DUFF while walking and climbing up stairs, fatigue and BL LE edema. On 11/20 she underwent TAVR, drainage of retroperitoneal hematoma and pericardial window with Dr. Garland and Noah. Intraoperatively, she had an episode of Vtach (on Amiodarone).      Her hospital course was complicated by the following: acute hypoxic respiratory failure (requiring BiPAP and eventual intubation on 11/25), hemorrhagic shock, AFIB w/ RVR, coffee ground emesis, Code Blue (s/p CPR resulting in rib fractures), AIDA, fever secondary to aspiration PNA, +pseudomonas in sputum, persistent leukocytosis and low grade fevers (on Vanco per ID), L lateral abdominal wall hematoma, distended gallbladder and gallbladder sludge, dysphagia (s/p trache and PEG placement on 12/3), decannulation (12/13), L groin delayed wound healing (requiring wound vac), and hyperglycemia.     PM&R was consulted and deemed her an appropriate candidate for IRF. She was medically stabilized and cleared for discharge to [facility].  (19 Dec 2024 13:21)      PAST MEDICAL & SURGICAL HISTORY:  Aortic stenosis      Chronic obstructive pulmonary disease      Type 2 diabetes mellitus      Non-alcoholic fatty liver disease      HLD (hyperlipidemia)      HTN (hypertension)      Obstructive sleep apnea on CPAP      Substance Use (street drugs): ( X ) never used  (  ) other:  Tobacco Usage:  (   ) never smoked   ( X  ) former smoker   (   ) current smoker  (     ) pack year  Alcohol Usage: denies        Allergies    penicillins (Hives)  Tetanus Immune Globulin (Unknown)  adhesives (Rash)  Typhoid Vaccines (Anaphylaxis)    Intolerances        albuterol/ipratropium for Nebulization 3 milliLiter(s) Nebulizer every 6 hours  aMIOdarone    Tablet 200 milliGRAM(s) Oral daily  AQUAPHOR (petrolatum Ointment) 1 Application(s) Topical two times a day  aspirin enteric coated 81 milliGRAM(s) Oral daily  buMETAnide 1 milliGRAM(s) Oral daily  clopidogrel Tablet 75 milliGRAM(s) Oral daily  dextrose 5%. 1000 milliLiter(s) IV Continuous <Continuous>  dextrose 5%. 1000 milliLiter(s) IV Continuous <Continuous>  dextrose 50% Injectable 25 Gram(s) IV Push once  dextrose 50% Injectable 12.5 Gram(s) IV Push once  dextrose 50% Injectable 25 Gram(s) IV Push once  dextrose Oral Gel 15 Gram(s) Oral once PRN  escitalopram 20 milliGRAM(s) Oral at bedtime  glucagon  Injectable 1 milliGRAM(s) IntraMuscular once  insulin glargine Injectable (LANTUS) 12 Unit(s) SubCutaneous at bedtime  insulin lispro (ADMELOG) corrective regimen sliding scale   SubCutaneous three times a day before meals  insulin lispro (ADMELOG) corrective regimen sliding scale   SubCutaneous at bedtime  metoprolol tartrate 12.5 milliGRAM(s) Oral two times a day  pantoprazole    Tablet 40 milliGRAM(s) Oral before breakfast      REVIEW OF SYSTEMS:  Constitutional: No fever, No Chills, + fatigue  HEENT: No eye pain, No visual disturbances, No difficulty hearing  Pulm: +intermittent nonproductive cough,  No shortness of breath  Cardio: No chest pain, No palpitations  GI:  No abdominal pain, +  nausea, No vomiting, No diarrhea, No constipation LBM 12/19  : No dysuria, No frequency, No hematuria  Neuro: No headaches, No memory loss, No loss of strength, No numbness, No tremors  Skin: No itching, No rashes, No lesions   Endo: No temperature intolerance  MSK: No joint pain, No joint swelling, No muscle pain, No Neck pain,  No back pain  Psych:  No depression, No anxiety  T(C): 36.9 (12-20-24 @ 07:45), Max: 36.9 (12-20-24 @ 07:45)  HR: 84 (12-20-24 @ 07:45) (67 - 84)  BP: 128/77 (12-20-24 @ 07:45) (113/71 - 148/78)  RR: 16 (12-20-24 @ 07:45) (16 - 16)  SpO2: 96% (12-20-24 @ 07:45) (96% - 97%)  Wt(kg): --Vital Signs Last 24 Hrs  T(C): 36.9 (20 Dec 2024 07:45), Max: 36.9 (20 Dec 2024 07:45)  T(F): 98.5 (20 Dec 2024 07:45), Max: 98.5 (20 Dec 2024 07:45)  HR: 84 (20 Dec 2024 07:45) (67 - 84)  BP: 128/77 (20 Dec 2024 07:45) (113/71 - 148/78)  BP(mean): --  RR: 16 (20 Dec 2024 07:45) (16 - 16)  SpO2: 96% (20 Dec 2024 07:45) (96% - 97%)    Parameters below as of 20 Dec 2024 07:45  Patient On (Oxygen Delivery Method): room air        PHYSICAL EXAM:  GENERAL: NAD, well-groomed, well-developed  HEAD:  Atraumatic, Normocephalic  EYES: EOMI, conjunctiva and sclera clear  ENMT: Moist mucous membranes  NECK: dressing at trach, Supple, No JVD  NERVOUS SYSTEM:  Alert & Oriented X3, Good concentration  CHEST/LUNG: Clear to percussion bilaterally; No rales, rhonchi, wheezing  HEART: Regular rate and rhythm  ABDOMEN: Soft, Nontender, Nondistended; Bowel sounds present, +PEG  EXTREMITIES: No clubbing, cyanosis, or edema      LABS:                        9.8    7.01  )-----------( 165      ( 20 Dec 2024 09:53 )             30.7     12-20    134[L]  |  98  |  15  ----------------------------<  179[H]  3.6   |  29  |  0.74    Ca    8.8      20 Dec 2024 09:53    TPro  7.1  /  Alb  2.3[L]  /  TBili  0.6  /  DBili  x   /  AST  28  /  ALT  34  /  AlkPhos  224[H]  12-20      Urinalysis Basic - ( 20 Dec 2024 09:53 )    Color: x / Appearance: x / SG: x / pH: x  Gluc: 179 mg/dL / Ketone: x  / Bili: x / Urobili: x   Blood: x / Protein: x / Nitrite: x   Leuk Esterase: x / RBC: x / WBC x   Sq Epi: x / Non Sq Epi: x / Bacteria: x       CAPILLARY BLOOD GLUCOSE      POCT Blood Glucose.: 312 mg/dL (20 Dec 2024 12:04)  POCT Blood Glucose.: 144 mg/dL (20 Dec 2024 07:53)  POCT Blood Glucose.: 167 mg/dL (19 Dec 2024 21:48)  POCT Blood Glucose.: 182 mg/dL (19 Dec 2024 17:27)        Urinalysis Basic - ( 20 Dec 2024 09:53 )    Color: x / Appearance: x / SG: x / pH: x  Gluc: 179 mg/dL / Ketone: x  / Bili: x / Urobili: x   Blood: x / Protein: x / Nitrite: x   Leuk Esterase: x / RBC: x / WBC x   Sq Epi: x / Non Sq Epi: x / Bacteria: x        RADIOLOGY & ADDITIONAL TESTS:    Consultant(s) Notes Reviewed:  [x ] YES  [ ] NO  Care Discussed with Consultants/Other Providers [ x] YES  [ ] NO  Imaging Personally Reviewed:  [ ] YES  [ ] NO
INTERVAL HPI/OVERNIGHT EVENTS:  HPI:  Marysol Drake is a 79 year old, obese, female with PMH of DAYANA (on CPAP), aortic stenosis, HTN, HLD, current smoker, COPD, Type 2 Diabetes Mellitus, and non-alcoholic fatty liver disease; who presented to [facility] on 11/20 for a scheduled TAVR, and possible stent placement.  She reports several month history of DUFF while walking and climbing up stairs, fatigue and BL LE edema. On 11/20 she underwent TAVR, drainage of retroperitoneal hematoma and pericardial window with Dr. Garland and Noah. Intraoperatively, she had an episode of Vtach (on Amiodarone).      Her hospital course was complicated by the following: acute hypoxic respiratory failure (requiring BiPAP and eventual intubation on 11/25), hemorrhagic shock, AFIB w/ RVR, coffee ground emesis, Code Blue (s/p CPR resulting in rib fractures), AIDA, fever secondary to aspiration PNA, +pseudomonas in sputum, persistent leukocytosis and low grade fevers (on Vanco per ID), L lateral abdominal wall hematoma, distended gallbladder and gallbladder sludge, dysphagia (s/p trache and PEG placement on 12/3), decannulation (12/13), L groin delayed wound healing (requiring wound vac), and hyperglycemia.     PM&R was consulted and deemed her an appropriate candidate for IRF. She was medically stabilized and cleared for discharge to [facility].      MEDICATIONS  (STANDING):  albuterol/ipratropium for Nebulization 3 milliLiter(s) Nebulizer every 6 hours  aMIOdarone    Tablet 200 milliGRAM(s) Oral daily  AQUAPHOR (petrolatum Ointment) 1 Application(s) Topical two times a day  aspirin enteric coated 81 milliGRAM(s) Oral daily  buMETAnide 1 milliGRAM(s) Oral daily  clopidogrel Tablet 75 milliGRAM(s) Oral daily  dextrose 5%. 1000 milliLiter(s) (50 mL/Hr) IV Continuous <Continuous>  dextrose 5%. 1000 milliLiter(s) (100 mL/Hr) IV Continuous <Continuous>  dextrose 50% Injectable 25 Gram(s) IV Push once  dextrose 50% Injectable 12.5 Gram(s) IV Push once  dextrose 50% Injectable 25 Gram(s) IV Push once  escitalopram 20 milliGRAM(s) Oral at bedtime  glucagon  Injectable 1 milliGRAM(s) IntraMuscular once  guaiFENesin  milliGRAM(s) Oral every 12 hours  insulin glargine Injectable (LANTUS) 12 Unit(s) SubCutaneous at bedtime  insulin lispro (ADMELOG) corrective regimen sliding scale   SubCutaneous three times a day before meals  insulin lispro (ADMELOG) corrective regimen sliding scale   SubCutaneous at bedtime  metoprolol tartrate 12.5 milliGRAM(s) Oral two times a day  nystatin Powder 1 Application(s) Topical two times a day  pantoprazole    Tablet 40 milliGRAM(s) Oral before breakfast    MEDICATIONS  (PRN):  acetaminophen     Tablet .. 650 milliGRAM(s) Oral every 6 hours PRN Mild Pain (1 - 3)  dextrose Oral Gel 15 Gram(s) Oral once PRN Blood Glucose LESS THAN 70 milliGRAM(s)/deciliter  lidocaine 5% Ointment 1 Application(s) Topical three times a day PRN rib pain      Allergies    penicillins (Hives)  Tetanus Immune Globulin (Unknown)  adhesives (Rash)  Typhoid Vaccines (Anaphylaxis)    Intolerances        PAST MEDICAL & SURGICAL HISTORY:  Aortic stenosis      Chronic obstructive pulmonary disease      Type 2 diabetes mellitus      Non-alcoholic fatty liver disease      HLD (hyperlipidemia)      HTN (hypertension)      Obstructive sleep apnea on CPAP    REVIEW OF SYSTEMS- See HPI    PHYSICAL EXAM:   Vital Signs:  Vital Signs Last 24 Hrs  T(C): 36.9 (28 Dec 2024 07:55), Max: 37 (27 Dec 2024 19:34)  T(F): 98.4 (28 Dec 2024 07:55), Max: 98.6 (27 Dec 2024 19:34)  HR: 62 (28 Dec 2024 07:55) (62 - 86)  BP: 148/84 (28 Dec 2024 07:55) (123/71 - 149/73)  BP(mean): --  RR: 16 (28 Dec 2024 07:55) (16 - 16)  SpO2: 100% (28 Dec 2024 07:55) (94% - 100%)    Parameters below as of 28 Dec 2024 07:55  Patient On (Oxygen Delivery Method): room air      Daily     Daily I&O's Summary    27 Dec 2024 07:01  -  28 Dec 2024 07:00  --------------------------------------------------------  IN: 0 mL / OUT: 5 mL / NET: -5 mL        GENERAL:  Appears stated age, well-groomed, well-nourished, no distress  HEENT:  Moist and clear sclera and conjuctivae  CHEST:  Full & symmetric excursion, no increased effort, breath sounds clear  HEART:  Regular rhythm, S1, S2  ABDOMEN:  Soft, non-tender, non-distended, normoactive bowel sounds,  no masses, peg rotating freely  EXTREMITIES:  no edema  SKIN:  No rash  NEURO:  Alert, oriented, no tremor, no encephalopathy

## 2025-01-02 NOTE — DISCHARGE NOTE NURSING/CASE MANAGEMENT/SOCIAL WORK - FINANCIAL ASSISTANCE
[facility] provides services at a reduced cost to those who are determined to be eligible through [facility]’s financial assistance program. Information regarding [facility]’s financial assistance program can be found by going to [email or web address] or by calling [phone].

## 2025-01-02 NOTE — PROGRESS NOTE ADULT - SUBJECTIVE AND OBJECTIVE BOX
CC: S/p TAVR     Today's Subjective & Objective Findings:  Patient seen and examined at bedside this AM, and observed in therapy.  Admits to sleeping well. Daughter present for family training.   Discussed possible PEG removal tomorrow with GI.     Denies headache, dizziness, visual changes, chest pain, SOB/DUFF, abdominal pain, nausea, vomiting, diarrhea, dysuria, numbness or tingling. LBM 1/1  Left groin wound on wound care    Therapy  Ambulated 150 feet with rollator with supervision, forward flexed posture with  increased time.  Sitting rest breaks as needed on rollator.  Patient able to  correctly and safely stabilize rollator on wall, lock brakes and turn to sit.  NAVIN RPE 11 with ambulation trials.  Negotiated up and down 8-6 inch steps with bilateral handrails, step-to pattern,  with supervision.  NAVIN RPE 12 with stair training.  Seated yellow resistance band hip abduction and knee flexion 3 x 10 reps each.  Toileting:  Ambulatory transfer to [facility] with no device with close  supervision; patient able to manage pants and perform alexandru-hygiene without    Vital Signs Last 24 Hrs  T(C): 36.7 (02 Jan 2025 07:55), Max: 36.7 (02 Jan 2025 07:55)  T(F): 98 (02 Jan 2025 07:55), Max: 98 (02 Jan 2025 07:55)  HR: 74 (02 Jan 2025 07:55) (62 - 88)  BP: 146/77 (02 Jan 2025 07:55) (129/75 - 146/77)  BP(mean): --  RR: 16 (02 Jan 2025 07:55) (16 - 16)  SpO2: 93% (02 Jan 2025 07:55) (92% - 95%)    PHYSICAL EXAM  Constitutional -Comfortable, cheerful  Neck - Supple  Cardiovascular - Palpable peripheral pulses   Respiratory/Chest- decreased air entry b/l Rt lower lobes and few rhonchi. normal air entry left lobes   Abdomen - Soft, Non-tender, mild erythema around PEG   Extremities - No cyanosis, No edema,     Neurologic Exam - Alert, Oriented  Sensory - Light touch sensation intact  Motor Function - Moves all extremities spontaneously  Skin -  Trach site with healthy scab, open to air,  L groin wound- wet to dry packing with gauze, CDI, L flank bruising, generalized ecchymosis and scabbing, b/l dry feet    LABS:                        9.0    6.76  )-----------( 217      ( 02 Jan 2025 07:00 )             28.6     01-02    141  |  105  |  10  ----------------------------<  101[H]  4.5   |  29  |  0.57    Ca    9.0      02 Jan 2025 07:00    TPro  6.5  /  Alb  2.2[L]  /  TBili  0.2  /  DBili  x   /  AST  16  /  ALT  17  /  AlkPhos  126[H]  01-02      Urinalysis Basic - ( 02 Jan 2025 07:00 )    Color: x / Appearance: x / SG: x / pH: x  Gluc: 101 mg/dL / Ketone: x  / Bili: x / Urobili: x   Blood: x / Protein: x / Nitrite: x   Leuk Esterase: x / RBC: x / WBC x   Sq Epi: x / Non Sq Epi: x / Bacteria: x        < from: Xray Chest 1 View- PORTABLE-Urgent (Xray Chest 1 View- PORTABLE-Urgent .) (12.23.24 @ 11:01) >   XR CHEST PORTABLE URGENT 1V   ORDERED BY:  SOURAV SHEETS     PROCEDURE DATE:  12/23/2024      INTERPRETATION:  AP chest on December 23, 2024 at 10:51 AM. Patient has   wheezing.    Heart magnified by technique. TAVR aortic valve noted.    There is slight blunting right base laterally new since December 5, 2023.    IMPRESSION: Slight blunting of right base laterally new since prior.      CAPILLARY BLOOD GLUCOSE  POCT Blood Glucose.: 105 mg/dL (02 Jan 2025 07:49)  POCT Blood Glucose.: 102 mg/dL (01 Jan 2025 21:25)  POCT Blood Glucose.: 115 mg/dL (01 Jan 2025 16:50)  POCT Blood Glucose.: 82 mg/dL (01 Jan 2025 11:31)        MEDICATIONS  (STANDING):  albuterol/ipratropium for Nebulization 3 milliLiter(s) Nebulizer every 6 hours  aMIOdarone    Tablet 200 milliGRAM(s) Oral daily  AQUAPHOR (petrolatum Ointment) 1 Application(s) Topical two times a day  aspirin enteric coated 81 milliGRAM(s) Oral daily  buMETAnide 1 milliGRAM(s) Oral daily  clopidogrel Tablet 75 milliGRAM(s) Oral daily  dextrose 5%. 1000 milliLiter(s) (50 mL/Hr) IV Continuous <Continuous>  dextrose 5%. 1000 milliLiter(s) (100 mL/Hr) IV Continuous <Continuous>  dextrose 50% Injectable 25 Gram(s) IV Push once  dextrose 50% Injectable 12.5 Gram(s) IV Push once  dextrose 50% Injectable 25 Gram(s) IV Push once  escitalopram 20 milliGRAM(s) Oral at bedtime  glucagon  Injectable 1 milliGRAM(s) IntraMuscular once  guaiFENesin  milliGRAM(s) Oral every 12 hours  insulin lispro (ADMELOG) corrective regimen sliding scale   SubCutaneous three times a day before meals  insulin lispro (ADMELOG) corrective regimen sliding scale   SubCutaneous at bedtime  linagliptin 5 milliGRAM(s) Oral daily  metFORMIN 500 milliGRAM(s) Oral two times a day with meals  metoprolol tartrate 12.5 milliGRAM(s) Oral two times a day  nystatin Powder 1 Application(s) Topical two times a day  pantoprazole    Tablet 40 milliGRAM(s) Oral before breakfast    MEDICATIONS  (PRN):  acetaminophen     Tablet .. 650 milliGRAM(s) Oral every 6 hours PRN Mild Pain (1 - 3)  benzonatate 100 milliGRAM(s) Oral three times a day PRN Cough  dextrose Oral Gel 15 Gram(s) Oral once PRN Blood Glucose LESS THAN 70 milliGRAM(s)/deciliter  guaiFENesin Oral Liquid (Sugar-Free) 100 milliGRAM(s) Oral every 6 hours PRN Cough  lidocaine 5% Ointment 1 Application(s) Topical three times a day PRN rib pain   CC: S/p TAVR     Today's Subjective & Objective Findings:  Patient seen and examined at bedside this AM, and observed in therapy.  Reports good night rest, regular bowel movements, no nasal congestion  Patient has long smoking hx and keeps company of many animals--dogs, horses  Was using nightly CPAP prior to acute hosp care,but no longer requiring this  Mild cough is persisting   Admits to sleeping well. Daughter present for family training. Patient's daughter present during review while another daughter, a cardiologist engaged in phone  Discussed possible PEG removal tomorrow with GI. decision to be finalized after their review today      Denies headache, dizziness, visual changes, chest pain, SOB/DUFF, abdominal pain, nausea, vomiting, diarrhea, dysuria, numbness or tingling. LBM 1/1  Left groin wound on wound care, surface area is very minimal, no discharge    Therapy  Ambulated 100ft, 50ft with RW standing rest breaks as needed; verbal cues for  diaphragmatic breathing to improve stamina.  Forward flexed posture with  ambulation, increased time.  Noted SOB toward end of walking.  Therapeutic Exercise  Sit to stands (hands of knees) 3x 5 reps  *Pt required constant motivation/encouragement to participate through strenuous  activity  Pain Reassessment  Patient currently without complaints of pain.  Cognitive deficits--deficits with higher executive function and some impulsive/risk taking behavior  No aggression    Vital Signs Last 24 Hrs  T(C): 36.7 (02 Jan 2025 07:55), Max: 36.7 (02 Jan 2025 07:55)  T(F): 98 (02 Jan 2025 07:55), Max: 98 (02 Jan 2025 07:55)  HR: 74 (02 Jan 2025 07:55) (62 - 88)  BP: 146/77 (02 Jan 2025 07:55) (129/75 - 146/77)  RR: 16 (02 Jan 2025 07:55) (16 - 16)  SpO2: 93% (02 Jan 2025 07:55) (92% - 95%)    PHYSICAL EXAM  Constitutional -Comfortable,   Neck - Supple  Cardiovascular - Palpable peripheral pulses   Respiratory/Chest- decreased air entry b/l Rt lower lobes and few rhonchi. normal air entry left lobes   Abdomen - Soft, Non-tender, mild erythema around PEG   Extremities - No cyanosis, No edema,     Neurologic Exam - Alert, Oriented  Sensory - Light touch sensation intact  Motor Function - Moves all extremities spontaneously  Skin -    L groin wound- wet to dry packing with gauze,     LABS:                        9.0    6.76  )-----------( 217      ( 02 Jan 2025 07:00 )             28.6     01-02    141  |  105  |  10  ----------------------------<  101[H]  4.5   |  29  |  0.57    Ca    9.0      02 Jan 2025 07:00    TPro  6.5  /  Alb  2.2[L]  /  TBili  0.2  /  DBili  x   /  AST  16  /  ALT  17  /  AlkPhos  126[H]  01-02      Urinalysis Basic - ( 02 Jan 2025 07:00 )    Color: x / Appearance: x / SG: x / pH: x  Gluc: 101 mg/dL / Ketone: x  / Bili: x / Urobili: x   Blood: x / Protein: x / Nitrite: x   Leuk Esterase: x / RBC: x / WBC x   Sq Epi: x / Non Sq Epi: x / Bacteria: x        < from: Xray Chest 1 View- PORTABLE-Urgent (Xray Chest 1 View- PORTABLE-Urgent .) (12.23.24 @ 11:01) >   XR CHEST PORTABLE URGENT 1V   ORDERED BY:  SOURAV SHEETS     PROCEDURE DATE:  12/23/2024      INTERPRETATION:  AP chest on December 23, 2024 at 10:51 AM. Patient has   wheezing.    Heart magnified by technique. TAVR aortic valve noted.    There is slight blunting right base laterally new since December 5, 2023.    IMPRESSION: Slight blunting of right base laterally new since prior.      CAPILLARY BLOOD GLUCOSE  POCT Blood Glucose.: 105 mg/dL (02 Jan 2025 07:49)  POCT Blood Glucose.: 102 mg/dL (01 Jan 2025 21:25)  POCT Blood Glucose.: 115 mg/dL (01 Jan 2025 16:50)  POCT Blood Glucose.: 82 mg/dL (01 Jan 2025 11:31)        MEDICATIONS  (STANDING):  albuterol/ipratropium for Nebulization 3 milliLiter(s) Nebulizer every 6 hours  aMIOdarone    Tablet 200 milliGRAM(s) Oral daily  AQUAPHOR (petrolatum Ointment) 1 Application(s) Topical two times a day  aspirin enteric coated 81 milliGRAM(s) Oral daily  buMETAnide 1 milliGRAM(s) Oral daily  clopidogrel Tablet 75 milliGRAM(s) Oral daily  dextrose 5%. 1000 milliLiter(s) (50 mL/Hr) IV Continuous <Continuous>  dextrose 5%. 1000 milliLiter(s) (100 mL/Hr) IV Continuous <Continuous>  dextrose 50% Injectable 25 Gram(s) IV Push once  dextrose 50% Injectable 12.5 Gram(s) IV Push once  dextrose 50% Injectable 25 Gram(s) IV Push once  escitalopram 20 milliGRAM(s) Oral at bedtime  glucagon  Injectable 1 milliGRAM(s) IntraMuscular once  guaiFENesin  milliGRAM(s) Oral every 12 hours  insulin lispro (ADMELOG) corrective regimen sliding scale   SubCutaneous three times a day before meals  insulin lispro (ADMELOG) corrective regimen sliding scale   SubCutaneous at bedtime  linagliptin 5 milliGRAM(s) Oral daily  metFORMIN 500 milliGRAM(s) Oral two times a day with meals  metoprolol tartrate 12.5 milliGRAM(s) Oral two times a day  nystatin Powder 1 Application(s) Topical two times a day  pantoprazole    Tablet 40 milliGRAM(s) Oral before breakfast    MEDICATIONS  (PRN):  acetaminophen     Tablet .. 650 milliGRAM(s) Oral every 6 hours PRN Mild Pain (1 - 3)  benzonatate 100 milliGRAM(s) Oral three times a day PRN Cough  dextrose Oral Gel 15 Gram(s) Oral once PRN Blood Glucose LESS THAN 70 milliGRAM(s)/deciliter  guaiFENesin Oral Liquid (Sugar-Free) 100 milliGRAM(s) Oral every 6 hours PRN Cough  lidocaine 5% Ointment 1 Application(s) Topical three times a day PRN rib pain

## 2025-01-02 NOTE — PROGRESS NOTE ADULT - NS ATTEND AMEND GEN_ALL_CORE FT
Seen and examined, findings as noted    No interval med events  Review and discussion with patient and family as stated    Persisting cough and Rt lung atelectasis, smoking hx and having multiple pets  Oral intake unremarkable     Improved ambulatory distance   Labs unremarkable     Continue therapy   DC date extended from 1/4 to around 1/7 to ensure further cognitive/functional optimization, GI removal of PEG  Pulmonary consult and imaging to clarify on Rt lung atelectasis and clarify on further need for CPAP post dc       Spent 56 mins   Patient review, observation in therapy, discussion of labs, function, dc planning and care co ordination

## 2025-01-02 NOTE — PROGRESS NOTE ADULT - ASSESSMENT
79 year old, obese, female with PMH of DAYANA (on CPAP), aortic stenosis, HTN, HLD, current smoker, COPD, Type 2 Diabetes Mellitus, and non-alcoholic fatty liver disease; who presented to [facility] on 11/20 for a scheduled TAVR, and possible stent placement.  She reports several month history of DFUF while walking and climbing up stairs, fatigue and BL LE edema. On 11/20 she underwent TAVR, drainage of retroperitoneal hematoma and pericardial window with Dr. Garland and Noah. Intraoperatively, she had an episode of Vtach (on Amiodarone).    Her hospital course was complicated by the following: acute hypoxic respiratory failure (requiring BiPAP and eventual intubation on 11/25), hemorrhagic shock, AFIB w/ RVR, coffee ground emesis, Code Blue (s/p CPR resulting in rib fractures), AIDA, fever secondary to aspiration PNA, +pseudomonas in sputum, persistent leukocytosis and low grade fevers (on Vanco per ID), L lateral abdominal wall hematoma, distended gallbladder and gallbladder sludge, dysphagia (s/p trache and PEG placement on 12/3), decannulation (12/13), L groin delayed wound healing (requiring wound vac), and hyperglycemia.  Patient now admitted for a multidisciplinary rehab program. 12-19-24 @ 13:25    * Left groin wound - wound recs appreciated   * Cognitive deficits - SLP following   * PEG - possible PEG removal with GI on 1/3   * Persistent Cough R/t lung atelectasis - Repeat CXR on 1/2 - Await CT chest and pulmonary consult     #Aortic valve disease S/p TAVR  - Gait Instability, ADL impairments and Functional impairments:   Continue Comprehensive Rehab Program of PT/OT/SLP  - 3 hours a day, 5 days a week  - ASA 81mg daily  - Plavix 75mg daily     REHAB ISSUES  Decreased ambulatory distance  Left groin wound  Impaired balance  Resp failure     #Acute Hypoxic Respiratory Failures   #Cough  - PRN: Nebulizer QID   - on 1L NC; wean as tolerated as patient does not wear o2 at home.  (On nightly CPAP prior to acute care admission, but no longer requiring this all through admission)   - CXR with R lung base atelectasis  - Mucinex BID  - Chest PT   - Incentive Spirometer   - Repeat CXR 1/2   - Await CT chest and pulm consult     #HTN  #AFIB w/ RVR  - Amiodarone 200mg daily   - Bumex 1mg daily   - Metoprolol 12.5mg BID     #Type 2 Diabetes Mellitus  - A1c: 6.4% (Dec 2024)    - FS AC & HS  - Mod ISS  - Lantus 12U HS    #Mood  - Escitalopram 20mg daily     #Skin - Trach site with healthy scab, open to air,  L groin wound- wet to dry packing with gauze, CDI, L flank bruising, generalized ecchymosis and scabbing, b/l dry feet  - Aquaphor BID   - Pressure injury/Skin: OOB to Chair, PT/OT   - Left groin wound - wound care recs appreciated   --Moisten dressing before removal, then daily Normal Saline Cleanse of Left groin wound, pat thoroughly dry.  Apply Cavillon film barrier daily to intact periwound skin, allow to dry.  Gently pack area daily with saline moistened non-woven gauze (half of one piece), cover with folded dry non-woven gauze, cover with Tegaderm.    #Pain Mgmt   - PRN: Tylenol     #GI/Bowel Mgmt   - Pantoprazole  - Senna , Miralax    #/Bladder Mgmt --voiding     #FEN   - Diet - Regular with Thin Liquids   - Dysphagia  SLP - evaluation and treatment  - S/p MBS on 12/23 - passed, diet upgraded (as above)  - Dysphagia--tolerating oral feeds, PEG placed 12/3 - GI recs outpatient PEG removal after 1/7, if still admitted after 12/31 may reassess PEG removal while in hospital   - PEG - possible PEG removal with GI on 1/3 - Plavix held prior to removal     #Precautions / PROPHYLAXIS:   - Falls, Cardiac  - ortho: Weight bearing status: WBAT   - Lungs: Aspiration, Incentive Spirometer   - DVT: ASA 81mg daily & Plavix 75mg daily, TEDs   ----------------------------------------------  Next of Kin:   Daughter: Janie Drake: [phone]  ----------------------------------------------  IDT 12/31  Therapy-- Ambulating 150ft Rollator, supervision, 8 stairs 1 hand rail supervision  SLP--Mod cog deficits, impulsivity,   Barriers--left groin wound, cognitive deficits  Est 1/4 vs RACHEL dependent on family impression post family training on 1/2  Daughter currently abroad and coming for family training 1/2  -----------------------------------------------  Dr. SMITH's Liaison with Family/Providers  12/27--I called and d/w daughter Ms Mendez an RN at [facility],   I discussed patient's clinical and functional update----ambulatory distance, limited, trach site healing, wound care for left groin site, improved resp function  She reports that prior to acute care, patient had been living alone, smoker, using nightly CPAP,but currently not smoking, no longer using CPAP  Due family training 1/2, She is worried about d/c home if not functionally optimized, falls risk, further functional deterioration etc  Family is open to home dc if patient is functionally optimized or RACHEL if goals not being met  We agreed that therapy will continue for now, decision on d/c disposition will be made at family training on Tue 1/2  At present we are getting GI to review PEG, We will continue monitoring blunding of rt costophrenic angle and resolving ? atelectasis  She was happy with the update  -----------------------------------------------    OUTPATIENT/FOLLOW UP:    Todd Devine MD  Vascular surgery, General surgery  [address]  [address]  [place] [address]  [phone]    Todd Odom MD  Cardiology, Interventional  [facility]   [address]  [address]  [place], [address] [phone]   79 year old, obese, female with PMH of DAYANA (on CPAP), aortic stenosis, HTN, HLD, current smoker, COPD, Type 2 Diabetes Mellitus, and non-alcoholic fatty liver disease; who presented to [facility] on 11/20 for a scheduled TAVR, and possible stent placement.  She reports several month history of DUFF while walking and climbing up stairs, fatigue and BL LE edema. On 11/20 she underwent TAVR, drainage of retroperitoneal hematoma and pericardial window with Dr. Garland and Noah. Intraoperatively, she had an episode of Vtach (on Amiodarone).    Her hospital course was complicated by the following: acute hypoxic respiratory failure (requiring BiPAP and eventual intubation on 11/25), hemorrhagic shock, AFIB w/ RVR, coffee ground emesis, Code Blue (s/p CPR resulting in rib fractures), AIDA, fever secondary to aspiration PNA, +pseudomonas in sputum, persistent leukocytosis and low grade fevers (on Vanco per ID), L lateral abdominal wall hematoma, distended gallbladder and gallbladder sludge, dysphagia (s/p trache and PEG placement on 12/3), decannulation (12/13), L groin delayed wound healing (requiring wound vac), and hyperglycemia.  Patient now admitted for a multidisciplinary rehab program. 12-19-24 @ 13:25    * Left groin wound - wound recs appreciated   * Cognitive deficits - SLP following   * PEG - possible PEG removal with GI on 1/3   * Persistent Cough R/t lung atelectasis, hx of smoking and many pets at home--dogs, horses (on CPAP at home prior to acute hosp care, but no longer requiring it)   - Repeat CXR on 1/2 - Await CT chest and pulmonary consult r/o primary lung disease and clarify on persisting Rt lung atelectasis  * Family training today, Family and patient to decide between home dc with home care services, d/c to one of her children's home temporarily or short term RACHEL placement     #Aortic valve disease S/p TAVR  - Gait Instability, ADL impairments and Functional impairments:   Continue Comprehensive Rehab Program of PT/OT/SLP  - 3 hours a day, 5 days a week  - ASA 81mg daily  - Plavix 75mg daily     REHAB ISSUES  Decreased ambulatory distance  Left groin wound  Impaired balance  Resp failure     #Acute Hypoxic Respiratory Failures   #Cough  - PRN: Nebulizer QID   - on 1L NC; wean as tolerated as patient does not wear o2 at home.  (On nightly CPAP prior to acute care admission, but no longer requiring this all through admission)   - CXR with R lung base atelectasis  - Mucinex BID  - Chest PT   - Incentive Spirometer   - Repeat CXR 1/2   - Await CT chest and pulm consult     #HTN  #AFIB w/ RVR  - Amiodarone 200mg daily   - Bumex 1mg daily   - Metoprolol 12.5mg BID     #Type 2 Diabetes Mellitus  - A1c: 6.4% (Dec 2024)    - FS AC & HS  - Mod ISS  - Lantus 12U HS    #Mood  - Escitalopram 20mg daily     #Skin - Trach site with healthy scab, open to air,  L groin wound- wet to dry packing with gauze, CDI, L flank bruising, generalized ecchymosis and scabbing, b/l dry feet  - Aquaphor BID   - Pressure injury/Skin: OOB to Chair, PT/OT   - Left groin wound - wound care recs appreciated   --Moisten dressing before removal, then daily Normal Saline Cleanse of Left groin wound, pat thoroughly dry.  Apply Cavillon film barrier daily to intact periwound skin, allow to dry.  Gently pack area daily with saline moistened non-woven gauze (half of one piece), cover with folded dry non-woven gauze, cover with Tegaderm.    #Pain Mgmt   - PRN: Tylenol     #GI/Bowel Mgmt   - Pantoprazole  - Senna , Miralax    #/Bladder Mgmt --voiding     #FEN   - Diet - Regular with Thin Liquids   - Dysphagia  SLP - evaluation and treatment  - S/p MBS on 12/23 - passed, diet upgraded (as above)  - Dysphagia--tolerating oral feeds, PEG placed 12/3 - GI recs outpatient PEG removal after 1/7, if still admitted after 12/31 may reassess PEG removal while in hospital   - PEG - possible PEG removal with GI on 1/3 - Plavix held prior to removal     #Precautions / PROPHYLAXIS:   - Falls, Cardiac  - ortho: Weight bearing status: WBAT   - Lungs: Aspiration, Incentive Spirometer   - DVT: ASA 81mg daily & Plavix 75mg daily, TEDs   ----------------------------------------------  Next of Kin:   Daughter: Janie Drake: [phone]  ----------------------------------------------  IDT 12/31  Therapy-- Ambulating 150ft Rollator, supervision, 8 stairs 1 hand rail supervision  SLP--Mod cog deficits, impulsivity,   Barriers--left groin wound, cognitive deficits  Est 1/4 vs RACHEL dependent on family impression post family training on 1/2  Daughter currently abroad and coming for family training 1/2  -----------------------------------------------  Dr. SMITH's Liaison with Family/Providers  1/2--Meeting at bedside with patient, daughter and another daughter engaged via phone. We discussed her current functional progress as noted in last IDT  Persisting cog deficits with impulsivity.  Options for d/c disposition, GI f/u for possible PEG removal  Imaging to clarify on persisting Rt lung atelectasis  Family training today, Family and patient to decide between home dc with home care services, d/c to one of her children's home temporarily or short term RACHEL placement   -----------------------------------------------    OUTPATIENT/FOLLOW UP:    Todd Devine MD  Vascular surgery, General surgery  [address]  [address]  [place] [address]  [phone]    Todd Odom MD  Cardiology, Interventional  [facility]   [address]  [address]  [place], [address] [phone]

## 2025-01-02 NOTE — DISCHARGE NOTE NURSING/CASE MANAGEMENT/SOCIAL WORK - PATIENT PORTAL LINK FT
You can access the FollowMyHealth Patient Portal offered by [facility] by registering at the following website: [email or web address]. By joining [employer]’s FollowMyHealth portal, you will also be able to view your health information using other applications (apps) compatible with our system.

## 2025-01-03 LAB
GLUCOSE BLDC GLUCOMTR-MCNC: 113 MG/DL — HIGH (ref 70–99)
GLUCOSE BLDC GLUCOMTR-MCNC: 120 MG/DL — HIGH (ref 70–99)
GLUCOSE BLDC GLUCOMTR-MCNC: 153 MG/DL — HIGH (ref 70–99)
GLUCOSE BLDC GLUCOMTR-MCNC: 97 MG/DL — SIGNIFICANT CHANGE UP (ref 70–99)

## 2025-01-03 PROCEDURE — 99233 SBSQ HOSP IP/OBS HIGH 50: CPT

## 2025-01-03 RX ADMIN — Medication 600 MILLIGRAM(S): at 18:30

## 2025-01-03 RX ADMIN — FLUTICASONE PROPIONATE AND SALMETEROL 1 DOSE(S): 50; 500 POWDER ORAL; RESPIRATORY (INHALATION) at 21:48

## 2025-01-03 RX ADMIN — IPRATROPIUM BROMIDE AND ALBUTEROL SULFATE 3 MILLILITER(S): .5; 2.5 SOLUTION RESPIRATORY (INHALATION) at 16:04

## 2025-01-03 RX ADMIN — METFORMIN 500 MILLIGRAM(S): 850 TABLET ORAL at 18:30

## 2025-01-03 RX ADMIN — ESCITALOPRAM OXALATE 20 MILLIGRAM(S): 10 TABLET ORAL at 22:51

## 2025-01-03 RX ADMIN — METFORMIN 500 MILLIGRAM(S): 850 TABLET ORAL at 09:01

## 2025-01-03 RX ADMIN — Medication 2: at 12:01

## 2025-01-03 RX ADMIN — TIOTROPIUM BROMIDE MONOHYDRATE 2 PUFF(S): 18 CAPSULE ORAL; RESPIRATORY (INHALATION) at 08:25

## 2025-01-03 RX ADMIN — Medication 12.5 MILLIGRAM(S): at 18:31

## 2025-01-03 RX ADMIN — IPRATROPIUM BROMIDE AND ALBUTEROL SULFATE 3 MILLILITER(S): .5; 2.5 SOLUTION RESPIRATORY (INHALATION) at 08:25

## 2025-01-03 RX ADMIN — Medication 1 APPLICATION(S): at 18:33

## 2025-01-03 RX ADMIN — Medication 600 MILLIGRAM(S): at 05:30

## 2025-01-03 RX ADMIN — LINAGLIPTIN 5 MILLIGRAM(S): 5 TABLET, FILM COATED ORAL at 12:01

## 2025-01-03 RX ADMIN — Medication 12.5 MILLIGRAM(S): at 05:29

## 2025-01-03 RX ADMIN — Medication 1 APPLICATION(S): at 05:31

## 2025-01-03 RX ADMIN — IPRATROPIUM BROMIDE AND ALBUTEROL SULFATE 3 MILLILITER(S): .5; 2.5 SOLUTION RESPIRATORY (INHALATION) at 21:48

## 2025-01-03 RX ADMIN — AMIODARONE HYDROCHLORIDE 200 MILLIGRAM(S): 200 TABLET ORAL at 05:30

## 2025-01-03 RX ADMIN — FLUTICASONE PROPIONATE AND SALMETEROL 1 DOSE(S): 50; 500 POWDER ORAL; RESPIRATORY (INHALATION) at 08:30

## 2025-01-03 RX ADMIN — Medication 81 MILLIGRAM(S): at 12:03

## 2025-01-03 RX ADMIN — NYSTATIN TOPICAL POWDER 1 APPLICATION(S): 100000 POWDER TOPICAL at 05:31

## 2025-01-03 RX ADMIN — PANTOPRAZOLE 40 MILLIGRAM(S): 40 TABLET, DELAYED RELEASE ORAL at 05:29

## 2025-01-03 NOTE — PROGRESS NOTE ADULT - ASSESSMENT
79 year old woman with PMH of obesity, DAYANA (on CPAP), aortic stenosis, HTN, HLD, current smoker, COPD, Type 2 Diabetes Mellitus, and MASLD, s/p TAVR with complicated postop course, 12/3/2024 s/p PEG, now tolerating oral feeds.  Plan was to pull PEG tube this afternoon but she had lunch just before we arrive. After discussion she prefer waiting to pull PEG tube until tomorrow or Monday.

## 2025-01-03 NOTE — PROGRESS NOTE ADULT - SUBJECTIVE AND OBJECTIVE BOX
Chief Complaint:  Patient is a 79y old  Female who presents with a chief complaint of Aortic Valve Disease s/p TAVR (03 Jan 2025 09:11)    Patient seen and examined at bedside, no event over night. Plan was to pull PEG tube at bed side but she just had Lunch. After discussion she prefer waiting until tomorrow, or Monday.      MEDICATIONS:   MEDICATIONS  (STANDING):  albuterol/ipratropium for Nebulization 3 milliLiter(s) Nebulizer every 6 hours  aMIOdarone    Tablet 200 milliGRAM(s) Oral daily  AQUAPHOR (petrolatum Ointment) 1 Application(s) Topical two times a day  aspirin enteric coated 81 milliGRAM(s) Oral daily  buMETAnide 1 milliGRAM(s) Oral daily  clopidogrel Tablet 75 milliGRAM(s) Oral daily  dextrose 5%. 1000 milliLiter(s) (50 mL/Hr) IV Continuous <Continuous>  dextrose 5%. 1000 milliLiter(s) (100 mL/Hr) IV Continuous <Continuous>  dextrose 50% Injectable 25 Gram(s) IV Push once  dextrose 50% Injectable 12.5 Gram(s) IV Push once  dextrose 50% Injectable 25 Gram(s) IV Push once  escitalopram 20 milliGRAM(s) Oral at bedtime  fluticasone propionate/ salmeterol 250-50 MICROgram(s) Diskus 1 Dose(s) Inhalation two times a day  glucagon  Injectable 1 milliGRAM(s) IntraMuscular once  guaiFENesin  milliGRAM(s) Oral every 12 hours  hydrocodone/homatropine Syrup 5 milliLiter(s) Oral two times a day  insulin lispro (ADMELOG) corrective regimen sliding scale   SubCutaneous three times a day before meals  insulin lispro (ADMELOG) corrective regimen sliding scale   SubCutaneous at bedtime  linagliptin 5 milliGRAM(s) Oral daily  metFORMIN 500 milliGRAM(s) Oral two times a day with meals  metoprolol tartrate 12.5 milliGRAM(s) Oral two times a day  nystatin Powder 1 Application(s) Topical two times a day  pantoprazole    Tablet 40 milliGRAM(s) Oral before breakfast  tiotropium 2.5 MICROgram(s) Inhaler 2 Puff(s) Inhalation daily    MEDICATIONS  (PRN):  acetaminophen     Tablet .. 650 milliGRAM(s) Oral every 6 hours PRN Mild Pain (1 - 3)  benzonatate 100 milliGRAM(s) Oral three times a day PRN Cough  dextrose Oral Gel 15 Gram(s) Oral once PRN Blood Glucose LESS THAN 70 milliGRAM(s)/deciliter  guaiFENesin Oral Liquid (Sugar-Free) 100 milliGRAM(s) Oral every 6 hours PRN Cough  lidocaine 5% Ointment 1 Application(s) Topical three times a day PRN rib pain            DIET:  Diet, Regular:   Consistent Carbohydrate No Snacks  DASH/TLC Sodium & Cholesterol Restricted (12-23-24 @ 10:40) [Active]          ALLERGIES:   Allergies    penicillins (Hives)  Tetanus Immune Globulin (Unknown)  adhesives (Rash)  Typhoid Vaccines (Anaphylaxis)    Intolerances        VITAL SIGNS:   Vital Signs Last 24 Hrs  T(C): 36.7 (03 Jan 2025 08:07), Max: 36.7 (02 Jan 2025 21:03)  T(F): 98 (03 Jan 2025 08:07), Max: 98.1 (02 Jan 2025 21:03)  HR: 63 (03 Jan 2025 08:07) (63 - 76)  BP: 137/84 (03 Jan 2025 08:07) (137/84 - 159/74)  BP(mean): --  RR: 16 (03 Jan 2025 08:07) (16 - 16)  SpO2: 95% (03 Jan 2025 08:43) (93% - 95%)    Parameters below as of 03 Jan 2025 08:43  Patient On (Oxygen Delivery Method): room air      I&O's Summary      PHYSICAL EXAM:   GENERAL:  No acute distress  HEENT:  NC/AT, conjunctiva clear, sclera anicteric  CHEST:  No increased effort  HEART:  Regular rate  ABDOMEN:  Soft, non-tender, non-distended, positive bowel sounds, PEG tube +  EXTREMITIES: No edema  SKIN:  Warm, dry  NEURO:  Calm, cooperative    LABS:                        9.0    6.76  )-----------( 217      ( 02 Jan 2025 07:00 )             28.6     Hemoglobin: 9.0 g/dL (01-02-25 @ 07:00)    01-02    141  |  105  |  10  ----------------------------<  101[H]  4.5   |  29  |  0.57    Ca    9.0      02 Jan 2025 07:00    TPro  6.5  /  Alb  2.2[L]  /  TBili  0.2  /  DBili  x   /  AST  16  /  ALT  17  /  AlkPhos  126[H]  01-02    LIVER FUNCTIONS - ( 02 Jan 2025 07:00 )  Alb: 2.2 g/dL / Pro: 6.5 g/dL / ALK PHOS: 126 U/L / ALT: 17 U/L / AST: 16 U/L / GGT: x                                                     RADIOLOGY & ADDITIONAL STUDIES:         GI Follow up    Patient seen and examined at bedside, no event over night. Plan was to pull PEG tube at bedside today but she just had lunch. Denies complaints.    MEDICATIONS:   MEDICATIONS  (STANDING):  albuterol/ipratropium for Nebulization 3 milliLiter(s) Nebulizer every 6 hours  aMIOdarone    Tablet 200 milliGRAM(s) Oral daily  AQUAPHOR (petrolatum Ointment) 1 Application(s) Topical two times a day  aspirin enteric coated 81 milliGRAM(s) Oral daily  buMETAnide 1 milliGRAM(s) Oral daily  clopidogrel Tablet 75 milliGRAM(s) Oral daily  dextrose 5%. 1000 milliLiter(s) (50 mL/Hr) IV Continuous <Continuous>  dextrose 5%. 1000 milliLiter(s) (100 mL/Hr) IV Continuous <Continuous>  dextrose 50% Injectable 25 Gram(s) IV Push once  dextrose 50% Injectable 12.5 Gram(s) IV Push once  dextrose 50% Injectable 25 Gram(s) IV Push once  escitalopram 20 milliGRAM(s) Oral at bedtime  fluticasone propionate/ salmeterol 250-50 MICROgram(s) Diskus 1 Dose(s) Inhalation two times a day  glucagon  Injectable 1 milliGRAM(s) IntraMuscular once  guaiFENesin  milliGRAM(s) Oral every 12 hours  hydrocodone/homatropine Syrup 5 milliLiter(s) Oral two times a day  insulin lispro (ADMELOG) corrective regimen sliding scale   SubCutaneous three times a day before meals  insulin lispro (ADMELOG) corrective regimen sliding scale   SubCutaneous at bedtime  linagliptin 5 milliGRAM(s) Oral daily  metFORMIN 500 milliGRAM(s) Oral two times a day with meals  metoprolol tartrate 12.5 milliGRAM(s) Oral two times a day  nystatin Powder 1 Application(s) Topical two times a day  pantoprazole    Tablet 40 milliGRAM(s) Oral before breakfast  tiotropium 2.5 MICROgram(s) Inhaler 2 Puff(s) Inhalation daily    MEDICATIONS  (PRN):  acetaminophen     Tablet .. 650 milliGRAM(s) Oral every 6 hours PRN Mild Pain (1 - 3)  benzonatate 100 milliGRAM(s) Oral three times a day PRN Cough  dextrose Oral Gel 15 Gram(s) Oral once PRN Blood Glucose LESS THAN 70 milliGRAM(s)/deciliter  guaiFENesin Oral Liquid (Sugar-Free) 100 milliGRAM(s) Oral every 6 hours PRN Cough  lidocaine 5% Ointment 1 Application(s) Topical three times a day PRN rib pain            DIET:  Diet, Regular:   Consistent Carbohydrate No Snacks  DASH/TLC Sodium & Cholesterol Restricted (12-23-24 @ 10:40) [Active]          ALLERGIES:   Allergies    penicillins (Hives)  Tetanus Immune Globulin (Unknown)  adhesives (Rash)  Typhoid Vaccines (Anaphylaxis)    Intolerances        VITAL SIGNS:   Vital Signs Last 24 Hrs  T(C): 36.7 (03 Jan 2025 08:07), Max: 36.7 (02 Jan 2025 21:03)  T(F): 98 (03 Jan 2025 08:07), Max: 98.1 (02 Jan 2025 21:03)  HR: 63 (03 Jan 2025 08:07) (63 - 76)  BP: 137/84 (03 Jan 2025 08:07) (137/84 - 159/74)  BP(mean): --  RR: 16 (03 Jan 2025 08:07) (16 - 16)  SpO2: 95% (03 Jan 2025 08:43) (93% - 95%)    Parameters below as of 03 Jan 2025 08:43  Patient On (Oxygen Delivery Method): room air      I&O's Summary      PHYSICAL EXAM:   GENERAL:  No acute distress  HEENT:  NC/AT, conjunctiva clear, sclera anicteric  CHEST:  No increased effort  HEART:  Regular rate  ABDOMEN:  Soft, non-tender, non-distended, positive bowel sounds, PEG tube +  EXTREMITIES: No edema  SKIN:  Warm, dry  NEURO:  Calm, cooperative    LABS:                        9.0    6.76  )-----------( 217      ( 02 Jan 2025 07:00 )             28.6     Hemoglobin: 9.0 g/dL (01-02-25 @ 07:00)    01-02    141  |  105  |  10  ----------------------------<  101[H]  4.5   |  29  |  0.57    Ca    9.0      02 Jan 2025 07:00    TPro  6.5  /  Alb  2.2[L]  /  TBili  0.2  /  DBili  x   /  AST  16  /  ALT  17  /  AlkPhos  126[H]  01-02    LIVER FUNCTIONS - ( 02 Jan 2025 07:00 )  Alb: 2.2 g/dL / Pro: 6.5 g/dL / ALK PHOS: 126 U/L / ALT: 17 U/L / AST: 16 U/L / GGT: x

## 2025-01-03 NOTE — PROGRESS NOTE ADULT - SUBJECTIVE AND OBJECTIVE BOX
CC: S/p TAVR     Today's Subjective & Objective Findings:  Patient seen and examined at bedside this AM.  "I sleep like a rock."  Cough persisting.   Discussed negative UA results, CT chest results, pulmonary recs, and possible PEG removal today.     Denies headache, dizziness, visual changes, chest pain, SOB/DUFF, abdominal pain, nausea, vomiting, diarrhea, dysuria, numbness or tingling. LBM 1/2  Left groin wound on wound care, surface area is very minimal, no discharge    Therapy  Ambulated 100ft, 50ft with RW standing rest breaks as needed; verbal cues for  diaphragmatic breathing to improve stamina.  Forward flexed posture with  ambulation, increased time.  Noted SOB toward end of walking.  Therapeutic Exercise  Sit to stands (hands of knees) 3x 5 reps  *Pt required constant motivation/encouragement to participate through strenuous  activity  Pain Reassessment  Patient currently without complaints of pain.  Cognitive deficits--deficits with higher executive function and some impulsive/risk taking behavior  No aggression    Vital Signs Last 24 Hrs  T(C): 36.7 (03 Jan 2025 08:07), Max: 36.7 (02 Jan 2025 21:03)  T(F): 98 (03 Jan 2025 08:07), Max: 98.1 (02 Jan 2025 21:03)  HR: 63 (03 Jan 2025 08:07) (63 - 76)  BP: 137/84 (03 Jan 2025 08:07) (137/84 - 159/74)  BP(mean): --  RR: 16 (03 Jan 2025 08:07) (16 - 16)  SpO2: 95% (03 Jan 2025 08:43) (93% - 95%)    PHYSICAL EXAM  Constitutional -Comfortable,   Neck - Supple  Cardiovascular - Palpable peripheral pulses   Respiratory/Chest- decreased air entry b/l Rt lower lobes and few rhonchi. normal air entry left lobes   Abdomen - Soft, Non-tender, mild erythema around PEG   Extremities - No cyanosis, No edema,     Neurologic Exam - Alert, Oriented  Sensory - Light touch sensation intact  Motor Function - Moves all extremities spontaneously  Skin -    L groin wound- wet to dry packing with gauze,     LABS:                        9.0    6.76  )-----------( 217      ( 02 Jan 2025 07:00 )             28.6     01-02    141  |  105  |  10  ----------------------------<  101[H]  4.5   |  29  |  0.57    Ca    9.0      02 Jan 2025 07:00    TPro  6.5  /  Alb  2.2[L]  /  TBili  0.2  /  DBili  x   /  AST  16  /  ALT  17  /  AlkPhos  126[H]  01-02      Urinalysis Basic - ( 02 Jan 2025 07:00 )    Color: x / Appearance: x / SG: x / pH: x  Gluc: 101 mg/dL / Ketone: x  / Bili: x / Urobili: x   Blood: x / Protein: x / Nitrite: x   Leuk Esterase: x / RBC: x / WBC x   Sq Epi: x / Non Sq Epi: x / Bacteria: x        < from: Xray Chest 1 View- PORTABLE-Urgent (Xray Chest 1 View- PORTABLE-Urgent .) (12.23.24 @ 11:01) >   XR CHEST PORTABLE URGENT 1V   ORDERED BY:  SOURAV SHEETS     PROCEDURE DATE:  12/23/2024      INTERPRETATION:  AP chest on December 23, 2024 at 10:51 AM. Patient has   wheezing.    Heart magnified by technique. TAVR aortic valve noted.    There is slight blunting right base laterally new since December 5, 2023.    IMPRESSION: Slight blunting of right base laterally new since prior.      CAPILLARY BLOOD GLUCOSE  POCT Blood Glucose.: 97 mg/dL (03 Jan 2025 08:12)  POCT Blood Glucose.: 132 mg/dL (02 Jan 2025 21:13)  POCT Blood Glucose.: 129 mg/dL (02 Jan 2025 16:37)  POCT Blood Glucose.: 109 mg/dL (02 Jan 2025 11:22)        MEDICATIONS  (STANDING):  albuterol/ipratropium for Nebulization 3 milliLiter(s) Nebulizer every 6 hours  aMIOdarone    Tablet 200 milliGRAM(s) Oral daily  AQUAPHOR (petrolatum Ointment) 1 Application(s) Topical two times a day  aspirin enteric coated 81 milliGRAM(s) Oral daily  buMETAnide 1 milliGRAM(s) Oral daily  clopidogrel Tablet 75 milliGRAM(s) Oral daily  dextrose 5%. 1000 milliLiter(s) (50 mL/Hr) IV Continuous <Continuous>  dextrose 5%. 1000 milliLiter(s) (100 mL/Hr) IV Continuous <Continuous>  dextrose 50% Injectable 25 Gram(s) IV Push once  dextrose 50% Injectable 12.5 Gram(s) IV Push once  dextrose 50% Injectable 25 Gram(s) IV Push once  escitalopram 20 milliGRAM(s) Oral at bedtime  fluticasone propionate/ salmeterol 250-50 MICROgram(s) Diskus 1 Dose(s) Inhalation two times a day  glucagon  Injectable 1 milliGRAM(s) IntraMuscular once  guaiFENesin  milliGRAM(s) Oral every 12 hours  hydrocodone/homatropine Syrup 5 milliLiter(s) Oral two times a day  insulin lispro (ADMELOG) corrective regimen sliding scale   SubCutaneous three times a day before meals  insulin lispro (ADMELOG) corrective regimen sliding scale   SubCutaneous at bedtime  linagliptin 5 milliGRAM(s) Oral daily  metFORMIN 500 milliGRAM(s) Oral two times a day with meals  metoprolol tartrate 12.5 milliGRAM(s) Oral two times a day  nystatin Powder 1 Application(s) Topical two times a day  pantoprazole    Tablet 40 milliGRAM(s) Oral before breakfast  tiotropium 2.5 MICROgram(s) Inhaler 2 Puff(s) Inhalation daily    MEDICATIONS  (PRN):  acetaminophen     Tablet .. 650 milliGRAM(s) Oral every 6 hours PRN Mild Pain (1 - 3)  benzonatate 100 milliGRAM(s) Oral three times a day PRN Cough  dextrose Oral Gel 15 Gram(s) Oral once PRN Blood Glucose LESS THAN 70 milliGRAM(s)/deciliter  guaiFENesin Oral Liquid (Sugar-Free) 100 milliGRAM(s) Oral every 6 hours PRN Cough  lidocaine 5% Ointment 1 Application(s) Topical three times a day PRN rib pain   CC: S/p TAVR     Today's Subjective & Objective Findings:  Patient seen and examined at bedside this AM.  "I sleep like a rock."  Cough persisting.   Discussed negative UA results, CT chest results, pulmonary recs, and possible PEG removal today.     Denies headache, dizziness, visual changes, chest pain, SOB/DUFF, abdominal pain, nausea, vomiting, diarrhea, dysuria, numbness or tingling. LBM 1/2  Left groin wound on wound care, surface area is very minimal, no discharge    Therapy  Ambulated 100ft, 50ft with RW standing rest breaks as needed; verbal cues for  diaphragmatic breathing to improve stamina.  Forward flexed posture with  ambulation, increased time.  Noted SOB toward end of walking.  Therapeutic Exercise  Sit to stands (hands of knees) 3x 5 reps  *Pt required constant motivation/encouragement to participate through strenuous  activity  Pain Reassessment  Patient currently without complaints of pain.  Cognitive deficits--deficits with higher executive function and some impulsive/risk taking behavior  No aggression    Vital Signs Last 24 Hrs  T(C): 36.7 (03 Jan 2025 08:07), Max: 36.7 (02 Jan 2025 21:03)  T(F): 98 (03 Jan 2025 08:07), Max: 98.1 (02 Jan 2025 21:03)  HR: 63 (03 Jan 2025 08:07) (63 - 76)  BP: 137/84 (03 Jan 2025 08:07) (137/84 - 159/74)  RR: 16 (03 Jan 2025 08:07) (16 - 16)  SpO2: 95% (03 Jan 2025 08:43) (93% - 95%)    PHYSICAL EXAM  Constitutional -Comfortable  Neck - Supple  Cardiovascular - Palpable peripheral pulses   Respiratory/Chest- decreased air entry b/l Rt lower lobes and few rhonchi. normal air entry left lobes   Abdomen - Soft, Non-tender, mild erythema around PEG   Extremities - No cyanosis, No edema,     Neurologic Exam - Alert, Oriented  Sensory - Light touch sensation intact  Motor Function - Moves all extremities spontaneously  Skin -    L groin wound- wet to dry packing with gauze,     LABS             9.0    6.76  )-----------( 217      ( 02 Jan 2025 07:00 )             28.6     01-02    141  |  105  |  10  ----------------------------<  101[H]  4.5   |  29  |  0.57    Ca    9.0      02 Jan 2025 07:00    TPro  6.5  /  Alb  2.2[L]  /  TBili  0.2  /  DBili  x   /  AST  16  /  ALT  17  /  AlkPhos  126[H]  01-02      Urinalysis Basic - ( 02 Jan 2025 07:00 )    Color: x / Appearance: x / SG: x / pH: x  Gluc: 101 mg/dL / Ketone: x  / Bili: x / Urobili: x   Blood: x / Protein: x / Nitrite: x   Leuk Esterase: x / RBC: x / WBC x   Sq Epi: x / Non Sq Epi: x / Bacteria: x        < from: CT Chest No Cont (01.02.25 @ 11:40) >  CT CHEST   ORDERED BY:  SOURAV SHEETS     PROCEDURE DATE:  01/02/2025          INTERPRETATION:  INDICATION: Persistent RLL atelectasis  TECHNIQUE: Unenhanced CT of the chest. Coronal, sagittal, and MIP images   were reconstructed and reviewed.  COMPARISON: No prior chest CT. CT abdomen and pelvis 6/11/2024    FINDINGS:    AIRWAYS, LUNGS, PLEURA: Patent central airways. Emphysema. There are   bilateral nodular opacities and bandlike consolidations within the lung   bases possibly infectious in etiology. No pleural effusion.    LYMPH NODES, MEDIASTINUM: No lymphadenopathy. Indeterminant 0.9 cm   anterior mediastinal nodule.    HEART, VESSELS: Heart size is normal. No pericardial effusion. Thoracic   aorta normal in diameter. Mitral annulus and coronary calcifications.   TAVR.    UPPER ABDOMEN: Indeterminant isodense material within gallbladder.   Gastrostomy.    CHEST WALL, BONES: No aggressive osseous lesion. Old fracture deformity   of multiple bilateral ribs and sternum.    LOWER NECK: Within normal limits.    IMPRESSION:    Bilateral nodular opacities and bandlike consolidations within the lung   bases possibly infectious in etiology. Recommend follow-up chest CT in   4-8 weeks to determine resolution.    Emphysema.    Indeterminant 0.9 cm anterior mediastinal nodule. Recommend evaluation   with CT chest without and with contrast anterior mediastinal lesion   protocol.    Indeterminant isodense material within gallbladder. Further evaluation   can be performed with sonogram.    < end of copied text >    < from: Xray Chest 1 View- PORTABLE-Urgent (Xray Chest 1 View- PORTABLE-Urgent .) (12.23.24 @ 11:01) >   XR CHEST PORTABLE URGENT 1V   ORDERED BY:  SOURAV SHEETS     PROCEDURE DATE:  12/23/2024      INTERPRETATION:  AP chest on December 23, 2024 at 10:51 AM. Patient has   wheezing.    Heart magnified by technique. TAVR aortic valve noted.    There is slight blunting right base laterally new since December 5, 2023.    IMPRESSION: Slight blunting of right base laterally new since prior.      CAPILLARY BLOOD GLUCOSE  POCT Blood Glucose.: 97 mg/dL (03 Jan 2025 08:12)  POCT Blood Glucose.: 132 mg/dL (02 Jan 2025 21:13)  POCT Blood Glucose.: 129 mg/dL (02 Jan 2025 16:37)  POCT Blood Glucose.: 109 mg/dL (02 Jan 2025 11:22)        MEDICATIONS  (STANDING):  albuterol/ipratropium for Nebulization 3 milliLiter(s) Nebulizer every 6 hours  aMIOdarone    Tablet 200 milliGRAM(s) Oral daily  AQUAPHOR (petrolatum Ointment) 1 Application(s) Topical two times a day  aspirin enteric coated 81 milliGRAM(s) Oral daily  buMETAnide 1 milliGRAM(s) Oral daily  clopidogrel Tablet 75 milliGRAM(s) Oral daily  dextrose 5%. 1000 milliLiter(s) (50 mL/Hr) IV Continuous <Continuous>  dextrose 5%. 1000 milliLiter(s) (100 mL/Hr) IV Continuous <Continuous>  dextrose 50% Injectable 25 Gram(s) IV Push once  dextrose 50% Injectable 12.5 Gram(s) IV Push once  dextrose 50% Injectable 25 Gram(s) IV Push once  escitalopram 20 milliGRAM(s) Oral at bedtime  fluticasone propionate/ salmeterol 250-50 MICROgram(s) Diskus 1 Dose(s) Inhalation two times a day  glucagon  Injectable 1 milliGRAM(s) IntraMuscular once  guaiFENesin  milliGRAM(s) Oral every 12 hours  hydrocodone/homatropine Syrup 5 milliLiter(s) Oral two times a day  insulin lispro (ADMELOG) corrective regimen sliding scale   SubCutaneous three times a day before meals  insulin lispro (ADMELOG) corrective regimen sliding scale   SubCutaneous at bedtime  linagliptin 5 milliGRAM(s) Oral daily  metFORMIN 500 milliGRAM(s) Oral two times a day with meals  metoprolol tartrate 12.5 milliGRAM(s) Oral two times a day  nystatin Powder 1 Application(s) Topical two times a day  pantoprazole    Tablet 40 milliGRAM(s) Oral before breakfast  tiotropium 2.5 MICROgram(s) Inhaler 2 Puff(s) Inhalation daily    MEDICATIONS  (PRN):  acetaminophen     Tablet .. 650 milliGRAM(s) Oral every 6 hours PRN Mild Pain (1 - 3)  benzonatate 100 milliGRAM(s) Oral three times a day PRN Cough  dextrose Oral Gel 15 Gram(s) Oral once PRN Blood Glucose LESS THAN 70 milliGRAM(s)/deciliter  guaiFENesin Oral Liquid (Sugar-Free) 100 milliGRAM(s) Oral every 6 hours PRN Cough  lidocaine 5% Ointment 1 Application(s) Topical three times a day PRN rib pain

## 2025-01-03 NOTE — CHART NOTE - NSCHARTNOTEFT_GEN_A_CORE
NUTRITION Follow Up Note    Pt with adequate appetite/intake at this time. Pt typically consuming ~% of meals provided with no issues chewing/swallowing. No reported N/V/D/C at this time. LBM 1/02. Discussed DASH/TLC & consistent carbohydrate diet therapy with patient - well receptive. No edema noted. No Pressure Ulcers.     SOURCE: Patient [X]  Medical Record [X]  Nursing Staff [X]  Family Member []    DIET: Diet, Regular:   Consistent Carbohydrate {No Snacks}  DASH/TLC {Sodium & Cholesterol Restricted} (12-23-24 @ 10:40) [Active]          WEIGHT TRENDS: 178lbs       PERTINENT MEDICATIONS: MEDICATIONS  (STANDING):  albuterol/ipratropium for Nebulization 3 milliLiter(s) Nebulizer every 6 hours  aMIOdarone    Tablet 200 milliGRAM(s) Oral daily  AQUAPHOR (petrolatum Ointment) 1 Application(s) Topical two times a day  aspirin enteric coated 81 milliGRAM(s) Oral daily  buMETAnide 1 milliGRAM(s) Oral daily  clopidogrel Tablet 75 milliGRAM(s) Oral daily  dextrose 5%. 1000 milliLiter(s) (50 mL/Hr) IV Continuous <Continuous>  dextrose 5%. 1000 milliLiter(s) (100 mL/Hr) IV Continuous <Continuous>  dextrose 50% Injectable 25 Gram(s) IV Push once  dextrose 50% Injectable 12.5 Gram(s) IV Push once  dextrose 50% Injectable 25 Gram(s) IV Push once  escitalopram 20 milliGRAM(s) Oral at bedtime  fluticasone propionate/ salmeterol 250-50 MICROgram(s) Diskus 1 Dose(s) Inhalation two times a day  glucagon  Injectable 1 milliGRAM(s) IntraMuscular once  guaiFENesin  milliGRAM(s) Oral every 12 hours  hydrocodone/homatropine Syrup 5 milliLiter(s) Oral two times a day  insulin lispro (ADMELOG) corrective regimen sliding scale   SubCutaneous three times a day before meals  insulin lispro (ADMELOG) corrective regimen sliding scale   SubCutaneous at bedtime  linagliptin 5 milliGRAM(s) Oral daily  metFORMIN 500 milliGRAM(s) Oral two times a day with meals  metoprolol tartrate 12.5 milliGRAM(s) Oral two times a day  nystatin Powder 1 Application(s) Topical two times a day  pantoprazole    Tablet 40 milliGRAM(s) Oral before breakfast  tiotropium 2.5 MICROgram(s) Inhaler 2 Puff(s) Inhalation daily    MEDICATIONS  (PRN):  acetaminophen     Tablet .. 650 milliGRAM(s) Oral every 6 hours PRN Mild Pain (1 - 3)  benzonatate 100 milliGRAM(s) Oral three times a day PRN Cough  dextrose Oral Gel 15 Gram(s) Oral once PRN Blood Glucose LESS THAN 70 milliGRAM(s)/deciliter  guaiFENesin Oral Liquid (Sugar-Free) 100 milliGRAM(s) Oral every 6 hours PRN Cough  lidocaine 5% Ointment 1 Application(s) Topical three times a day PRN rib pain      PERTINENT LABS:  01-02 Na141 mmol/L Glu 101 mg/dL[H] K+ 4.5 mmol/L Cr  0.57 mg/dL BUN 10 mg/dL 01-02 Alb 2.2 g/dL[L]        ESTIMATED NEEDS:   [X] No Change Since Previous Assessment    PREVIOUS NUTRITION DIAGNOSIS: Swallowing Difficulty  related to dysphagia s/p intubation  as evidenced by easy to chew with mild thick liquids  Pt will tolerate least restrictive diet in order to meet >75% estimated nutritional needs      NUTRITION DIAGNOSIS IS [X] Ongoing     NEW NUTRITION DIAGNOSIS: [X] Not Applicable    INTERVENTIONS:   1. Continue Current Nutrition Care Regimen at This Time.     MONITORING & EVALUATION:   1. Weights   2. PO intakes   3. Skin integrity   4. Tolerance to diet prescription   5. Labs & POCT  6. Follow up (per protocol)    Registered Dietitian/Nutritionist Remains Available.  Micah Riggs RD    Contact: [phone] or via MS LISA

## 2025-01-03 NOTE — PROGRESS NOTE ADULT - NS ATTEND AMEND GEN_ALL_CORE FT
I have made amendments to the documentation where necessary. Additional comments: I have personally seen and examined the patient independently Medical records reviewed. I have made amendments to the documentation where necessary and adjusted the history, physical examination, and plan as documented by the NP.     Reports good night rest   Tolerating oral diet and having regular BMs    Sustained improvement in therapy   left groin wound, surface area reducing     CT chest findings discussed--multiple lung nodules and mediastinal nodule  She reports hx of pulmonary nodules on f/u with her pulmunologist   But reports that she has no hx of mediastinal mass  She has long smoking hx,    Continue therapy   DVT ppx  Await GI f/u and possible PEG removal   Commence treatments as recs by pulmonary team  Patient to f/u with her pulmunologist post dc       Spent 53 mins, patient review, discussion of imaging reports, care co ordination and d/c planning

## 2025-01-03 NOTE — PROGRESS NOTE ADULT - PROBLEM SELECTOR PLAN 1
Peg placed 12/03.  Currently PEG is not being used as she is tolerating oral feeds. Patient educated on ideal PEG removal time frame, as the tube was placed 12/03, recommend removal after 5 weeks. Patient verbalized understanding.    If medically possible continue hold Plavix   NPO after mid night  Plan to pull PEG tomorrow other wise Monday. Peg placed 12/03.  Currently PEG is not being used as she is tolerating oral feeds.    Continue holding Plavix   NPO after midnight  Plan to pull PEG tomorrow.

## 2025-01-03 NOTE — PROGRESS NOTE ADULT - ASSESSMENT
79 year old, obese, female with PMH of DAYANA (on CPAP), aortic stenosis, HTN, HLD, current smoker, COPD, Type 2 Diabetes Mellitus, and non-alcoholic fatty liver disease; who presented to [facility] on 11/20 for a scheduled TAVR, and possible stent placement.  She reports several month history of DUFF while walking and climbing up stairs, fatigue and BL LE edema. On 11/20 she underwent TAVR, drainage of retroperitoneal hematoma and pericardial window with Dr. Garland and Noah. Intraoperatively, she had an episode of Vtach (on Amiodarone).    Her hospital course was complicated by the following: acute hypoxic respiratory failure (requiring BiPAP and eventual intubation on 11/25), hemorrhagic shock, AFIB w/ RVR, coffee ground emesis, Code Blue (s/p CPR resulting in rib fractures), ADIA, fever secondary to aspiration PNA, +pseudomonas in sputum, persistent leukocytosis and low grade fevers (on Vanco per ID), L lateral abdominal wall hematoma, distended gallbladder and gallbladder sludge, dysphagia (s/p trache and PEG placement on 12/3), decannulation (12/13), L groin delayed wound healing (requiring wound vac), and hyperglycemia.  Patient now admitted for a multidisciplinary rehab program. 12-19-24 @ 13:25    * Left groin wound - wound recs appreciated   * Cognitive deficits - SLP following   * PEG - possible PEG removal with GI today  * Persistent Cough R/t lung atelectasis, hx of smoking and many pets at home--dogs, horses (on CPAP at home prior to acute hosp care, but no longer requiring it)  * CT chest (1/2) with BL nodules, consolidations, and .9cm anterior mediastinal mass - pulm recs appreciated - repeat CT Chest in 3-4 months   * Family training completed 1/2 - OR home for 1/7 with private hire aide    #Aortic valve disease S/p TAVR  - Gait Instability, ADL impairments and Functional impairments:   Continue Comprehensive Rehab Program of PT/OT/SLP  - 3 hours a day, 5 days a week  - ASA 81mg daily  - Plavix 75mg daily     REHAB ISSUES  Decreased ambulatory distance  Left groin wound  Impaired balance  Resp failure     #Acute Hypoxic Respiratory Failures   #Cough  (On nightly CPAP prior to acute care admission, but no longer requiring this all through admission)   - CXR with R lung base atelectasis  - Mucinex BID  - Chest PT   - Incentive Spirometer   - CT chest (1/2) with BL nodules, consolidations, and .9cm anterior mediastinal mass - pulm recs appreciated - repeat CT Chest in 3-4 months   - Advair  - Spiriva  - Duoneb every 6 hours  - Hycodan 5mL BID     #HTN  #AFIB w/ RVR  - Amiodarone 200mg daily   - Bumex 1mg daily   - Metoprolol 12.5mg BID     #Type 2 Diabetes Mellitus  - A1c: 6.4% (Dec 2024)    - FS AC & HS  - Mod ISS  - Lantus 12U HS    #Mood  - Escitalopram 20mg daily     #Skin - Trach site with healthy scab, open to air,  L groin wound- wet to dry packing with gauze, CDI, L flank bruising, generalized ecchymosis and scabbing, b/l dry feet  - Aquaphor BID   - Pressure injury/Skin: OOB to Chair, PT/OT   - Left groin wound - wound care recs appreciated   --Moisten dressing before removal, then daily Normal Saline Cleanse of Left groin wound, pat thoroughly dry.  Apply Cavillon film barrier daily to intact periwound skin, allow to dry.  Gently pack area daily with saline moistened non-woven gauze (half of one piece), cover with folded dry non-woven gauze, cover with Tegaderm.    #Pain Mgmt   - PRN: Tylenol     #GI/Bowel Mgmt   - Pantoprazole  - Senna , Miralax    #/Bladder Mgmt --voiding     #FEN   - Diet - Regular with Thin Liquids   - Dysphagia  SLP - evaluation and treatment  - S/p MBS on 12/23 - passed, diet upgraded (as above)  - Dysphagia--tolerating oral feeds, PEG placed 12/3 - GI recs outpatient PEG removal after 1/7, if still admitted after 12/31 may reassess PEG removal while in hospital   - PEG - possible PEG removal with GI on 1/3 - Plavix held prior to removal     #Precautions / PROPHYLAXIS:   - Falls, Cardiac  - ortho: Weight bearing status: WBAT   - Lungs: Aspiration, Incentive Spirometer   - DVT: ASA 81mg daily & Plavix 75mg daily, TEDs   ----------------------------------------------  Next of Kin:   Daughter: Janie Drake: [phone]  ----------------------------------------------  IDT 12/31  Therapy-- Ambulating 150ft Rollator, supervision, 8 stairs 1 hand rail supervision  SLP--Mod cog deficits, impulsivity,   Barriers--left groin wound, cognitive deficits  Est 1/4 vs RACHEL dependent on family impression post family training on 1/2  Daughter currently abroad and coming for family training 1/2  -----------------------------------------------  Dr. SMITH's Liaison with Family/Providers  1/2--Meeting at bedside with patient, daughter and another daughter engaged via phone. We discussed her current functional progress as noted in last IDT  Persisting cog deficits with impulsivity.  Options for d/c disposition, GI f/u for possible PEG removal  Imaging to clarify on persisting Rt lung atelectasis  Family training today, Family and patient to decide between home dc with home care services, d/c to one of her children's home temporarily or short term RACHEL placement   -----------------------------------------------    OUTPATIENT/FOLLOW UP:    Todd Devine MD  Vascular surgery, General surgery  [address]  [address]  [place] [address]  [phone]    Todd Odom MD  Cardiology, Interventional  [facility]   [address]  [address]  [facility], [address] [phone]   79 year old, obese, female with PMH of DAYANA (on CPAP), aortic stenosis, HTN, HLD, current smoker, COPD, Type 2 Diabetes Mellitus, and non-alcoholic fatty liver disease; who presented to [facility] on 11/20 for a scheduled TAVR, and possible stent placement.  She reports several month history of DUFF while walking and climbing up stairs, fatigue and BL LE edema. On 11/20 she underwent TAVR, drainage of retroperitoneal hematoma and pericardial window with Dr. Garland and Noah. Intraoperatively, she had an episode of Vtach (on Amiodarone).    Her hospital course was complicated by the following: acute hypoxic respiratory failure (requiring BiPAP and eventual intubation on 11/25), hemorrhagic shock, AFIB w/ RVR, coffee ground emesis, Code Blue (s/p CPR resulting in rib fractures), AIDA, fever secondary to aspiration PNA, +pseudomonas in sputum, persistent leukocytosis and low grade fevers (on Vanco per ID), L lateral abdominal wall hematoma, distended gallbladder and gallbladder sludge, dysphagia (s/p trache and PEG placement on 12/3), decannulation (12/13), L groin delayed wound healing (requiring wound vac), and hyperglycemia.  Patient now admitted for a multidisciplinary rehab program. 12-19-24 @ 13:25    * Cognitive deficits - SLP following   * PEG - possible PEG removal with GI today  * Persistent Cough R/t lung atelectasis, hx of smoking and many pets at home--dogs, horses (on CPAP at home prior to acute hosp care, but no longer requiring it)  * CT chest (1/2) with BL nodules, consolidations, and .9cm anterior mediastinal mass - pulm recs appreciated - repeat CT Chest in 3-4 months   * Family training completed 1/2 - IA home for 1/7 with private hire aide    #Aortic valve disease S/p TAVR  - Gait Instability, ADL impairments and Functional impairments:   Continue Comprehensive Rehab Program of PT/OT/SLP  - 3 hours a day, 5 days a week  - ASA 81mg daily  - Plavix 75mg daily     REHAB ISSUES  Decreased ambulatory distance  Left groin wound  Impaired balance  Resp failure     #Acute Hypoxic Respiratory Failures   #Cough  (On nightly CPAP prior to acute care admission, but no longer requiring this all through admission)   - CXR with R lung base atelectasis  - Mucinex BID  - Chest PT   - Incentive Spirometer   - CT chest (1/2) emphysema, BL nodules, consolidations, and .9cm anterior mediastinal mass - pulm recs appreciated - repeat CT Chest in 3-4 months   - Advair  - Spiriva  - Duoneb every 6 hours  - Hycodan 5mL BID     #HTN  #AFIB w/ RVR  - Amiodarone 200mg daily   - Bumex 1mg daily   - Metoprolol 12.5mg BID     #Type 2 Diabetes Mellitus  - A1c: 6.4% (Dec 2024)    - FS AC & HS  - Mod ISS  - Lantus 12U HS    #Mood  - Escitalopram 20mg daily     #Skin - Trach site with healthy scab, open to air,  L groin wound- wet to dry packing with gauze, CDI, L flank bruising, generalized ecchymosis and scabbing, b/l dry feet  - Aquaphor BID   - Pressure injury/Skin: OOB to Chair, PT/OT   - Left groin wound - wound care recs appreciated   --Moisten dressing before removal, then daily Normal Saline Cleanse of Left groin wound, pat thoroughly dry.  Apply Cavillon film barrier daily to intact periwound skin, allow to dry.  Gently pack area daily with saline moistened non-woven gauze (half of one piece), cover with folded dry non-woven gauze, cover with Tegaderm.    #Pain Mgmt   - PRN: Tylenol     #GI/Bowel Mgmt   - Pantoprazole  - Senna , Miralax    #/Bladder Mgmt --voiding     #FEN   - Diet - Regular with Thin Liquids   - Dysphagia  SLP - evaluation and treatment  - S/p MBS on 12/23 - passed, diet upgraded (as above)  - Dysphagia--tolerating oral feeds, PEG placed 12/3 - GI recs outpatient PEG removal after 1/7, if still admitted after 12/31 may reassess PEG removal while in hospital   - PEG - possible PEG removal with GI on 1/3 - Plavix held prior to removal     #Precautions / PROPHYLAXIS:   - Falls, Cardiac  - ortho: Weight bearing status: WBAT   - Lungs: Aspiration, Incentive Spirometer   - DVT: ASA 81mg daily & Plavix 75mg daily, TEDs   ----------------------------------------------  Next of Kin:   Daughter: Janie Drake: [phone]  ----------------------------------------------  IDT 12/31  Therapy-- Ambulating 150ft Rollator, supervision, 8 stairs 1 hand rail supervision  SLP--Mod cog deficits, impulsivity,   Barriers--left groin wound, cognitive deficits  Est 1/4 vs RACHEL dependent on family impression post family training on 1/2  Daughter currently abroad and coming for family training 1/2  -----------------------------------------------  Dr. SMITH's Liaison with Family/Providers  1/2--Meeting at bedside with patient, daughter and another daughter engaged via phone. We discussed her current functional progress as noted in last IDT  Persisting cog deficits with impulsivity.  Options for d/c disposition, GI f/u for possible PEG removal  Imaging to clarify on persisting Rt lung atelectasis  Family training today, Family and patient to decide between home dc with home care services, d/c to one of her children's home temporarily or short term RACHEL placement   -----------------------------------------------    OUTPATIENT/FOLLOW UP:    Todd Devine MD  Vascular surgery, General surgery  [address] [facility]  [address]  [place] [address] [phone]    Todd Odom MD  Cardiology, Interventional  [facility]   [address] [facility] [address]  [address]  [facility], [address] [phone]   79 year old, obese, female with PMH of DAYANA (on CPAP), aortic stenosis, HTN, HLD, current smoker, COPD, Type 2 Diabetes Mellitus, and non-alcoholic fatty liver disease; who presented to [facility] on 11/20 for a scheduled TAVR, and possible stent placement.  She reports several month history of DUFF while walking and climbing up stairs, fatigue and BL LE edema. On 11/20 she underwent TAVR, drainage of retroperitoneal hematoma and pericardial window with Dr. Garland and Noah. Intraoperatively, she had an episode of Vtach (on Amiodarone).    Her hospital course was complicated by the following: acute hypoxic respiratory failure (requiring BiPAP and eventual intubation on 11/25), hemorrhagic shock, AFIB w/ RVR, coffee ground emesis, Code Blue (s/p CPR resulting in rib fractures), AIDA, fever secondary to aspiration PNA, +pseudomonas in sputum, persistent leukocytosis and low grade fevers (on Vanco per ID), L lateral abdominal wall hematoma, distended gallbladder and gallbladder sludge, dysphagia (s/p trache and PEG placement on 12/3), decannulation (12/13), L groin delayed wound healing (requiring wound vac), and hyperglycemia.  Patient now admitted for a multidisciplinary rehab program. 12-19-24 @ 13:25    * Cognitive deficits - SLP following   * PEG - possible PEG removal with GI today  * Persistent Cough R/t lung atelectasis, hx of smoking and many pets at home--dogs, horses (on CPAP at home prior to acute hosp care, but no longer requiring it)  * CT chest (1/2) with BL nodules, consolidations, and .9cm anterior mediastinal mass - pulm recs appreciated - repeat CT Chest in 3-4 months   * Family training completed 1/2 - OR home for 1/7 with private hire aide    #Aortic valve disease S/p TAVR  - Gait Instability, ADL impairments and Functional impairments:   Continue Comprehensive Rehab Program of PT/OT/SLP  - 3 hours a day, 5 days a week  - ASA 81mg daily  - Plavix 75mg daily     REHAB ISSUES  Decreased ambulatory distance  Left groin wound  Impaired balance  Resp failure     #Acute Hypoxic Respiratory Failures   #Cough  (On nightly CPAP prior to acute care admission, but no longer requiring this all through admission)   - CXR with R lung base atelectasis  - Mucinex BID  - Chest PT   - Incentive Spirometer   - CT chest (1/2) emphysema, BL nodules, consolidations, and .9cm anterior mediastinal mass - pulm recs appreciated - repeat CT Chest in 3-4 months   - Advair  - Spiriva  - Duoneb every 6 hours  - Hycodan 5mL BID     #HTN  #AFIB w/ RVR  - Amiodarone 200mg daily   - Bumex 1mg daily   - Metoprolol 12.5mg BID     #Type 2 Diabetes Mellitus  - A1c: 6.4% (Dec 2024)    - FS AC & HS  - Mod ISS  - Lantus 12U HS    #Mood  - Escitalopram 20mg daily     #Skin - Trach site with healthy scab, open to air,  L groin wound- wet to dry packing with gauze, CDI, L flank bruising, generalized ecchymosis and scabbing, b/l dry feet  - Aquaphor BID   - Pressure injury/Skin: OOB to Chair, PT/OT   - Left groin wound - wound care recs appreciated   --Moisten dressing before removal, then daily Normal Saline Cleanse of Left groin wound, pat thoroughly dry.  Apply Cavillon film barrier daily to intact periwound skin, allow to dry.  Gently pack area daily with saline moistened non-woven gauze (half of one piece), cover with folded dry non-woven gauze, cover with Tegaderm.    #Pain Mgmt   - PRN: Tylenol     #GI/Bowel Mgmt   - Pantoprazole  - Senna , Miralax    #/Bladder Mgmt --voiding     #FEN   - Diet - Regular with Thin Liquids   - Dysphagia  SLP - evaluation and treatment  - S/p MBS on 12/23 - passed, diet upgraded (as above)  - Dysphagia--tolerating oral feeds, PEG placed 12/3 - GI recs outpatient PEG removal after 1/7, if still admitted after 12/31 may reassess PEG removal while in hospital   - PEG - possible PEG removal with GI on 1/3 - Plavix held prior to removal     #Precautions / PROPHYLAXIS:   - Falls, Cardiac  - ortho: Weight bearing status: WBAT   - Lungs: Aspiration, Incentive Spirometer   - DVT: ASA 81mg daily & Plavix 75mg daily, TEDs   ----------------------------------------------  Next of Kin:   Daughter: Janie Drake: [phone]    HEALTH MANAGEMENT   1/3--Patient reports that family has made arrangements for a live in care giver post discharge  They are also interested in the home care as referred    ----------------------------------------------  IDT 12/31  Therapy-- Ambulating 150ft Rollator, supervision, 8 stairs 1 hand rail supervision  SLP--Mod cog deficits, impulsivity,   Barriers--left groin wound, cognitive deficits  Est 1/4 vs RACHEL dependent on family impression post family training on 1/2  Daughter currently abroad and coming for family training 1/2  -----------------------------------------------  Dr. SMITH's Liaison with Family/Providers  1/2--Meeting at bedside with patient, daughter and another daughter engaged via phone. We discussed her current functional progress as noted in last IDT  Persisting cog deficits with impulsivity.  Options for d/c disposition, GI f/u for possible PEG removal  Imaging to clarify on persisting Rt lung atelectasis  Family training today, Family and patient to decide between home dc with home care services, d/c to one of her children's home temporarily or short term RACHEL placement   -----------------------------------------------    OUTPATIENT/FOLLOW UP:    Todd Devine MD  Vascular surgery, General surgery  [address] [facility] [facility]  [address]  [place] [address]  [phone]    Todd Odom MD  Cardiology, Interventional  [facility]   [address]  [address]  [facility], [address] [phone]

## 2025-01-03 NOTE — PROGRESS NOTE ADULT - SUBJECTIVE AND OBJECTIVE BOX
Time of Visit:  Patient seen and examined.     MEDICATIONS  (STANDING):  albuterol/ipratropium for Nebulization 3 milliLiter(s) Nebulizer every 6 hours  aMIOdarone    Tablet 200 milliGRAM(s) Oral daily  AQUAPHOR (petrolatum Ointment) 1 Application(s) Topical two times a day  aspirin enteric coated 81 milliGRAM(s) Oral daily  buMETAnide 1 milliGRAM(s) Oral daily  dextrose 5%. 1000 milliLiter(s) (50 mL/Hr) IV Continuous <Continuous>  dextrose 5%. 1000 milliLiter(s) (100 mL/Hr) IV Continuous <Continuous>  dextrose 50% Injectable 25 Gram(s) IV Push once  dextrose 50% Injectable 12.5 Gram(s) IV Push once  dextrose 50% Injectable 25 Gram(s) IV Push once  escitalopram 20 milliGRAM(s) Oral at bedtime  fluticasone propionate/ salmeterol 250-50 MICROgram(s) Diskus 1 Dose(s) Inhalation two times a day  glucagon  Injectable 1 milliGRAM(s) IntraMuscular once  guaiFENesin  milliGRAM(s) Oral every 12 hours  hydrocodone/homatropine Syrup 5 milliLiter(s) Oral two times a day  insulin lispro (ADMELOG) corrective regimen sliding scale   SubCutaneous three times a day before meals  insulin lispro (ADMELOG) corrective regimen sliding scale   SubCutaneous at bedtime  linagliptin 5 milliGRAM(s) Oral daily  metFORMIN 500 milliGRAM(s) Oral two times a day with meals  metoprolol tartrate 12.5 milliGRAM(s) Oral two times a day  nystatin Powder 1 Application(s) Topical two times a day  pantoprazole    Tablet 40 milliGRAM(s) Oral before breakfast  tiotropium 2.5 MICROgram(s) Inhaler 2 Puff(s) Inhalation daily      MEDICATIONS  (PRN):  acetaminophen     Tablet .. 650 milliGRAM(s) Oral every 6 hours PRN Mild Pain (1 - 3)  benzonatate 100 milliGRAM(s) Oral three times a day PRN Cough  dextrose Oral Gel 15 Gram(s) Oral once PRN Blood Glucose LESS THAN 70 milliGRAM(s)/deciliter  guaiFENesin Oral Liquid (Sugar-Free) 100 milliGRAM(s) Oral every 6 hours PRN Cough  lidocaine 5% Ointment 1 Application(s) Topical three times a day PRN rib pain       Medications up to date at time of exam.      PHYSICAL EXAMINATION:  Patient has no new complaints.  GENERAL: The patient  in no apparent distress.     Vital Signs Last 24 Hrs  T(C): 36.7 (2025 08:07), Max: 36.7 (2025 21:03)  T(F): 98 (2025 08:07), Max: 98.1 (2025 21:03)  HR: 63 (2025 08:07) (63 - 76)  BP: 137/84 (2025 08:07) (137/84 - 159/74)  BP(mean): --  RR: 16 (2025 08:07) (16 - 16)  SpO2: 95% (2025 08:43) (93% - 95%)    Parameters below as of 2025 16:05  Patient On (Oxygen Delivery Method): room air       (if applicable)    Chest Tube (if applicable)    HEENT: Head is normocephalic and atraumatic. Extraocular muscles are intact. Mucous membranes are moist.     NECK: Supple, no palpable adenopathy.    LUNGS: Fair bilateral air entry   no wheezing, rales, or rhonchi.    HEART: Regular rate and rhythm without murmur.    ABDOMEN: Soft, nontender, and nondistended.  No hepatosplenomegaly is noted. + PEG     : No painful voiding, no pelvic pain    EXTREMITIES: Without any cyanosis, clubbing, rash, lesions or edema.    NEUROLOGIC: Awake, alert, oriented, grossly intact    SKIN: Warm, dry, good turgor.      LABS:                        9.0    6.76  )-----------( 217      ( 2025 07:00 )             28.6     01-    141  |  105  |  10  ----------------------------<  101[H]  4.5   |  29  |  0.57    Ca    9.0      2025 07:00    TPro  6.5  /  Alb  2.2[L]  /  TBili  0.2  /  DBili  x   /  AST  16  /  ALT  17  /  AlkPhos  126[H]  01-02      Urinalysis Basic - ( 2025 17:56 )    Color: Yellow / Appearance: Clear / S.009 / pH: x  Gluc: x / Ketone: Negative mg/dL  / Bili: Negative / Urobili: 0.2 mg/dL   Blood: x / Protein: Trace mg/dL / Nitrite: Negative   Leuk Esterase: Negative / RBC: x / WBC x   Sq Epi: x / Non Sq Epi: x / Bacteria: x                      MICROBIOLOGY: (if applicable)    RADIOLOGY & ADDITIONAL STUDIES:  EKG:   CXR:  ECHO:    IMPRESSION: 79y Female PAST MEDICAL & SURGICAL HISTORY:  Aortic stenosis      Chronic obstructive pulmonary disease      Type 2 diabetes mellitus      Non-alcoholic fatty liver disease      HLD (hyperlipidemia)      HTN (hypertension)      Obstructive sleep apnea on CPAP       p/w         IM{P: This is a 79 year old, obese, woman  DAYANA (on CPAP), aortic stenosis, HTN, HLD, former  smoker, COPD, Type 2 Diabetes Mellitus, and non-alcoholic fatty liver disease; who presented to [facility] on  for a scheduled TAVR, and possible stent placement.  She reports several month history of DUFF while walking and climbing up stairs, fatigue and BL LE edema. On  she underwent TAVR, drainage of retroperitoneal hematoma and pericardial window with Dr. Garland and Noah. Intraoperatively, she had an episode of Vtach (on Amiodarone).      Her hospital course was complicated by the following: acute hypoxic respiratory failure (requiring BiPAP and eventual intubation on ), hemorrhagic shock, AFIB w/ RVR, coffee ground emesis, Code Blue (s/p CPR resulting in rib fractures), AIDA, fever secondary to aspiration PNA, +pseudomonas in sputum, persistent leukocytosis and low grade fevers (on Vanco per ID), L lateral abdominal wall hematoma, distended gallbladder and gallbladder sludge, dysphagia (s/p trache and PEG placement on 12/3), decannulation (), L groin delayed wound healing (requiring wound vac), and hyperglycemia.     Pulmonary eval for PNA .  CT chest noted .            ASSESSMENT     - Cough / bronchitis   - COPD / Emphysema   - Mediastinal adenopathy   - Former smoker   - hx of Cardiac arrest   - S/P reversal of Trach   - HTN HLD  T2 DM   - Severe AS S/P TAVR       Sugg    - Hold off antibx   - Viral panel neg   - Advair 250 / 50 Q12h   - Spiriva QD   - Duoneb q6h   - Hycodan 5ml QAM and PM   - Monitor blood glucose  - Consider removing PEG   - Repeat CT chest in 3-4 months   - Out pat pulmo f/u   - GI f/u noted ; will remove PEG

## 2025-01-04 LAB
GLUCOSE BLDC GLUCOMTR-MCNC: 100 MG/DL — HIGH (ref 70–99)
GLUCOSE BLDC GLUCOMTR-MCNC: 100 MG/DL — HIGH (ref 70–99)
GLUCOSE BLDC GLUCOMTR-MCNC: 111 MG/DL — HIGH (ref 70–99)
GLUCOSE BLDC GLUCOMTR-MCNC: 131 MG/DL — HIGH (ref 70–99)

## 2025-01-04 PROCEDURE — 99232 SBSQ HOSP IP/OBS MODERATE 35: CPT

## 2025-01-04 PROCEDURE — 99233 SBSQ HOSP IP/OBS HIGH 50: CPT

## 2025-01-04 RX ORDER — CLOPIDOGREL BISULFATE 75 MG/1
75 TABLET, FILM COATED ORAL ONCE
Refills: 0 | Status: COMPLETED | OUTPATIENT
Start: 2025-01-04 | End: 2025-01-04

## 2025-01-04 RX ORDER — CLOPIDOGREL BISULFATE 75 MG/1
75 TABLET, FILM COATED ORAL DAILY
Refills: 0 | Status: DISCONTINUED | OUTPATIENT
Start: 2025-01-05 | End: 2025-01-07

## 2025-01-04 RX ADMIN — FLUTICASONE PROPIONATE AND SALMETEROL 1 DOSE(S): 50; 500 POWDER ORAL; RESPIRATORY (INHALATION) at 21:52

## 2025-01-04 RX ADMIN — BUMETANIDE 1 MILLIGRAM(S): 2 TABLET ORAL at 06:03

## 2025-01-04 RX ADMIN — TIOTROPIUM BROMIDE MONOHYDRATE 2 PUFF(S): 18 CAPSULE ORAL; RESPIRATORY (INHALATION) at 08:42

## 2025-01-04 RX ADMIN — IPRATROPIUM BROMIDE AND ALBUTEROL SULFATE 3 MILLILITER(S): .5; 2.5 SOLUTION RESPIRATORY (INHALATION) at 21:52

## 2025-01-04 RX ADMIN — NYSTATIN TOPICAL POWDER 1 APPLICATION(S): 100000 POWDER TOPICAL at 06:07

## 2025-01-04 RX ADMIN — LINAGLIPTIN 5 MILLIGRAM(S): 5 TABLET, FILM COATED ORAL at 14:53

## 2025-01-04 RX ADMIN — Medication 12.5 MILLIGRAM(S): at 06:02

## 2025-01-04 RX ADMIN — FLUTICASONE PROPIONATE AND SALMETEROL 1 DOSE(S): 50; 500 POWDER ORAL; RESPIRATORY (INHALATION) at 08:42

## 2025-01-04 RX ADMIN — PANTOPRAZOLE 40 MILLIGRAM(S): 40 TABLET, DELAYED RELEASE ORAL at 06:02

## 2025-01-04 RX ADMIN — Medication 600 MILLIGRAM(S): at 17:09

## 2025-01-04 RX ADMIN — CLOPIDOGREL BISULFATE 75 MILLIGRAM(S): 75 TABLET, FILM COATED ORAL at 17:08

## 2025-01-04 RX ADMIN — Medication 12.5 MILLIGRAM(S): at 17:09

## 2025-01-04 RX ADMIN — Medication 1 APPLICATION(S): at 06:07

## 2025-01-04 RX ADMIN — AMIODARONE HYDROCHLORIDE 200 MILLIGRAM(S): 200 TABLET ORAL at 06:03

## 2025-01-04 RX ADMIN — METFORMIN 500 MILLIGRAM(S): 850 TABLET ORAL at 17:09

## 2025-01-04 RX ADMIN — IPRATROPIUM BROMIDE AND ALBUTEROL SULFATE 3 MILLILITER(S): .5; 2.5 SOLUTION RESPIRATORY (INHALATION) at 08:43

## 2025-01-04 RX ADMIN — ESCITALOPRAM OXALATE 20 MILLIGRAM(S): 10 TABLET ORAL at 21:09

## 2025-01-04 RX ADMIN — Medication 600 MILLIGRAM(S): at 06:02

## 2025-01-04 RX ADMIN — Medication 100 MILLIGRAM(S): at 01:17

## 2025-01-04 RX ADMIN — IPRATROPIUM BROMIDE AND ALBUTEROL SULFATE 3 MILLILITER(S): .5; 2.5 SOLUTION RESPIRATORY (INHALATION) at 04:47

## 2025-01-04 RX ADMIN — Medication 81 MILLIGRAM(S): at 14:53

## 2025-01-04 NOTE — PROGRESS NOTE ADULT - SUBJECTIVE AND OBJECTIVE BOX
INTERVAL HPI/OVERNIGHT EVENTS:  HPI: Marysol Drake is a 79 year old, obese, female with PMH of DAYANA (on CPAP), aortic stenosis, HTN, HLD, current smoker, COPD, Type 2 Diabetes Mellitus, and non-alcoholic fatty liver disease; who presented to [facility] on  for a scheduled TAVR, and possible stent placement.  She reports several month history of DUFF while walking and climbing up stairs, fatigue and BL LE edema. On  she underwent TAVR, drainage of retroperitoneal hematoma and pericardial window with Dr. Garland and Noah. Intraoperatively, she had an episode of Vtach (on Amiodarone).    Her hospital course was complicated by the following: acute hypoxic respiratory failure (requiring BiPAP and eventual intubation on ), hemorrhagic shock, AFIB w/ RVR, coffee ground emesis, Code Blue (s/p CPR resulting in rib fractures), AIDA, fever secondary to aspiration PNA, +pseudomonas in sputum, persistent leukocytosis and low grade fevers (on Vanco per ID), L lateral abdominal wall hematoma, distended gallbladder and gallbladder sludge, dysphagia (s/p trache and PEG placement on 12/3), decannulation (), L groin delayed wound healing (requiring wound vac), and hyperglycemia. PM&R was consulted and deemed her an appropriate candidate for IRF. She was medically stabilized and cleared for discharge to [facility].  (19 Dec 2024 13:21)      GI Note: Patient seen and examined at bedside. Denies any abdominal pain or discomforts. 20 Fr PEG tube removed with intact bumper. Pressure applied, minimal bleeding noted. Dressing applied. Procedure tolerated well. Discussed with RN to monitor dressing.     MEDICATIONS  (STANDING):  albuterol/ipratropium for Nebulization 3 milliLiter(s) Nebulizer every 6 hours  aMIOdarone    Tablet 200 milliGRAM(s) Oral daily  AQUAPHOR (petrolatum Ointment) 1 Application(s) Topical two times a day  aspirin enteric coated 81 milliGRAM(s) Oral daily  buMETAnide 1 milliGRAM(s) Oral daily  dextrose 5%. 1000 milliLiter(s) (50 mL/Hr) IV Continuous <Continuous>  dextrose 5%. 1000 milliLiter(s) (100 mL/Hr) IV Continuous <Continuous>  dextrose 50% Injectable 25 Gram(s) IV Push once  dextrose 50% Injectable 12.5 Gram(s) IV Push once  dextrose 50% Injectable 25 Gram(s) IV Push once  escitalopram 20 milliGRAM(s) Oral at bedtime  fluticasone propionate/ salmeterol 250-50 MICROgram(s) Diskus 1 Dose(s) Inhalation two times a day  glucagon  Injectable 1 milliGRAM(s) IntraMuscular once  guaiFENesin  milliGRAM(s) Oral every 12 hours  hydrocodone/homatropine Syrup 5 milliLiter(s) Oral two times a day  insulin lispro (ADMELOG) corrective regimen sliding scale   SubCutaneous three times a day before meals  insulin lispro (ADMELOG) corrective regimen sliding scale   SubCutaneous at bedtime  linagliptin 5 milliGRAM(s) Oral daily  metFORMIN 500 milliGRAM(s) Oral two times a day with meals  metoprolol tartrate 12.5 milliGRAM(s) Oral two times a day  nystatin Powder 1 Application(s) Topical two times a day  pantoprazole    Tablet 40 milliGRAM(s) Oral before breakfast  tiotropium 2.5 MICROgram(s) Inhaler 2 Puff(s) Inhalation daily    MEDICATIONS  (PRN):  acetaminophen     Tablet .. 650 milliGRAM(s) Oral every 6 hours PRN Mild Pain (1 - 3)  benzonatate 100 milliGRAM(s) Oral three times a day PRN Cough  dextrose Oral Gel 15 Gram(s) Oral once PRN Blood Glucose LESS THAN 70 milliGRAM(s)/deciliter  guaiFENesin Oral Liquid (Sugar-Free) 100 milliGRAM(s) Oral every 6 hours PRN Cough  lidocaine 5% Ointment 1 Application(s) Topical three times a day PRN rib pain      Allergies    penicillins (Hives)  Tetanus Immune Globulin (Unknown)  adhesives (Rash)  Typhoid Vaccines (Anaphylaxis)    Intolerances        PAST MEDICAL & SURGICAL HISTORY:  Aortic stenosis      Chronic obstructive pulmonary disease      Type 2 diabetes mellitus      Non-alcoholic fatty liver disease      HLD (hyperlipidemia)      HTN (hypertension)      Obstructive sleep apnea on CPAP          REVIEW OF SYSTEMS    Negative unless indicated in HPI    PHYSICAL EXAM:   Vital Signs:  Vital Signs Last 24 Hrs  T(C): 36.8 (2025 07:54), Max: 36.9 (2025 22:48)  T(F): 98.3 (2025 07:54), Max: 98.4 (2025 22:48)  HR: 70 (2025 08:45) (70 - 83)  BP: 163/90 (2025 07:54) (122/69 - 163/90)  BP(mean): --  RR: 16 (2025 07:54) (16 - 16)  SpO2: 95% (2025 08:45) (91% - 95%)    Parameters below as of 2025 08:45  Patient On (Oxygen Delivery Method): room air      Daily     Daily I&O's Summary      GENERAL:  Appears stated age, well-groomed, well-nourished, no distress  HEENT:  NC/AT,    CHEST:  Full & symmetric excursion,   HEART:  Regular rhythm,  ABDOMEN:  Soft, non-tender, non-distended, Peg removed, site covered with 4x4 dressing, no bleeding noted  EXTREMITIES  no cyanosis, clubbing or edema  SKIN:  warm/dry  NEURO:  Alert, oriented      LABS:      Urinalysis Basic - ( 2025 17:56 )    Color: Yellow / Appearance: Clear / S.009 / pH: x  Gluc: x / Ketone: Negative mg/dL  / Bili: Negative / Urobili: 0.2 mg/dL   Blood: x / Protein: Trace mg/dL / Nitrite: Negative   Leuk Esterase: Negative / RBC: x / WBC x   Sq Epi: x / Non Sq Epi: x / Bacteria: x      amylase   lipase  RADIOLOGY & ADDITIONAL TESTS:   GI follow up    Patient seen and examined at bedside. Denies abdominal pain or discomfort. resting comfortably.      MEDICATIONS  (STANDING):  albuterol/ipratropium for Nebulization 3 milliLiter(s) Nebulizer every 6 hours  aMIOdarone    Tablet 200 milliGRAM(s) Oral daily  AQUAPHOR (petrolatum Ointment) 1 Application(s) Topical two times a day  aspirin enteric coated 81 milliGRAM(s) Oral daily  buMETAnide 1 milliGRAM(s) Oral daily  dextrose 5%. 1000 milliLiter(s) (50 mL/Hr) IV Continuous <Continuous>  dextrose 5%. 1000 milliLiter(s) (100 mL/Hr) IV Continuous <Continuous>  dextrose 50% Injectable 25 Gram(s) IV Push once  dextrose 50% Injectable 12.5 Gram(s) IV Push once  dextrose 50% Injectable 25 Gram(s) IV Push once  escitalopram 20 milliGRAM(s) Oral at bedtime  fluticasone propionate/ salmeterol 250-50 MICROgram(s) Diskus 1 Dose(s) Inhalation two times a day  glucagon  Injectable 1 milliGRAM(s) IntraMuscular once  guaiFENesin  milliGRAM(s) Oral every 12 hours  hydrocodone/homatropine Syrup 5 milliLiter(s) Oral two times a day  insulin lispro (ADMELOG) corrective regimen sliding scale   SubCutaneous three times a day before meals  insulin lispro (ADMELOG) corrective regimen sliding scale   SubCutaneous at bedtime  linagliptin 5 milliGRAM(s) Oral daily  metFORMIN 500 milliGRAM(s) Oral two times a day with meals  metoprolol tartrate 12.5 milliGRAM(s) Oral two times a day  nystatin Powder 1 Application(s) Topical two times a day  pantoprazole    Tablet 40 milliGRAM(s) Oral before breakfast  tiotropium 2.5 MICROgram(s) Inhaler 2 Puff(s) Inhalation daily    MEDICATIONS  (PRN):  acetaminophen     Tablet .. 650 milliGRAM(s) Oral every 6 hours PRN Mild Pain (1 - 3)  benzonatate 100 milliGRAM(s) Oral three times a day PRN Cough  dextrose Oral Gel 15 Gram(s) Oral once PRN Blood Glucose LESS THAN 70 milliGRAM(s)/deciliter  guaiFENesin Oral Liquid (Sugar-Free) 100 milliGRAM(s) Oral every 6 hours PRN Cough  lidocaine 5% Ointment 1 Application(s) Topical three times a day PRN rib pain      Allergies    penicillins (Hives)  Tetanus Immune Globulin (Unknown)  adhesives (Rash)  Typhoid Vaccines (Anaphylaxis)    Intolerances          PHYSICAL EXAM:   Vital Signs:  Vital Signs Last 24 Hrs  T(C): 36.8 (04 Jan 2025 07:54), Max: 36.9 (03 Jan 2025 22:48)  T(F): 98.3 (04 Jan 2025 07:54), Max: 98.4 (03 Jan 2025 22:48)  HR: 70 (04 Jan 2025 08:45) (70 - 83)  BP: 163/90 (04 Jan 2025 07:54) (122/69 - 163/90)  BP(mean): --  RR: 16 (04 Jan 2025 07:54) (16 - 16)  SpO2: 95% (04 Jan 2025 08:45) (91% - 95%)    Parameters below as of 04 Jan 2025 08:45  Patient On (Oxygen Delivery Method): room air      Daily     Daily I&O's Summary      GENERAL:  Appears stated age, well-groomed, well-nourished, no distress  HEENT:  NC/AT,    CHEST:  Full & symmetric excursion,   HEART:  Regular rhythm,  ABDOMEN:  Soft, non-tender, non-distended,+PEG in place  EXTREMITIES  no cyanosis, clubbing or edema  SKIN:  warm/dry  NEURO:  Alert, oriented      LABS:  no new labs today

## 2025-01-04 NOTE — PROGRESS NOTE ADULT - SUBJECTIVE AND OBJECTIVE BOX
CC: S/p TAVR     Today's Subjective & Objective Findings:  Patient seen and examined at bedside this AM.  no overnight issues   pt will get peg tube removed today      Denies headache, dizziness, visual changes, chest pain, SOB/DUFF, abdominal pain, nausea, vomiting, diarrhea, dysuria, numbness or tingling.  Left groin wound on wound care, surface area is very minimal, no discharge    Therapy  Ambulated 100ft, 50ft with RW standing rest breaks as needed; verbal cues for  diaphragmatic breathing to improve stamina.  Forward flexed posture with  ambulation, increased time.  Noted SOB toward end of walking.  Therapeutic Exercise  Sit to stands (hands of knees) 3x 5 reps  *Pt required constant motivation/encouragement to participate through strenuous  activity  Pain Reassessment  Patient currently without complaints of pain.  Cognitive deficits--deficits with higher executive function and some impulsive/risk taking behavior  No aggression    Vital Signs Last 24 Hrs  T(C): 36.7 (03 Jan 2025 08:07), Max: 36.7 (02 Jan 2025 21:03)  T(F): 98 (03 Jan 2025 08:07), Max: 98.1 (02 Jan 2025 21:03)  HR: 63 (03 Jan 2025 08:07) (63 - 76)  BP: 137/84 (03 Jan 2025 08:07) (137/84 - 159/74)  RR: 16 (03 Jan 2025 08:07) (16 - 16)  SpO2: 95% (03 Jan 2025 08:43) (93% - 95%)    PHYSICAL EXAM  Constitutional -Comfortable  Neck - Supple  Cardiovascular - Palpable peripheral pulses   Respiratory/Chest- decreased air entry b/l Rt lower lobes and few rhonchi. normal air entry left lobes   Abdomen - Soft, Non-tender, mild erythema around PEG   Extremities - No cyanosis, No edema,     Neurologic Exam - Alert, Oriented  Sensory - Light touch sensation intact  Motor Function - Moves all extremities spontaneously  Skin -    L groin wound- wet to dry packing with gauze,     LABS             9.0    6.76  )-----------( 217      ( 02 Jan 2025 07:00 )             28.6     01-02    141  |  105  |  10  ----------------------------<  101[H]  4.5   |  29  |  0.57    Ca    9.0      02 Jan 2025 07:00    TPro  6.5  /  Alb  2.2[L]  /  TBili  0.2  /  DBili  x   /  AST  16  /  ALT  17  /  AlkPhos  126[H]  01-02      Urinalysis Basic - ( 02 Jan 2025 07:00 )    Color: x / Appearance: x / SG: x / pH: x  Gluc: 101 mg/dL / Ketone: x  / Bili: x / Urobili: x   Blood: x / Protein: x / Nitrite: x   Leuk Esterase: x / RBC: x / WBC x   Sq Epi: x / Non Sq Epi: x / Bacteria: x        < from: CT Chest No Cont (01.02.25 @ 11:40) >  CT CHEST   ORDERED BY:  SOURAV SHEETS     PROCEDURE DATE:  01/02/2025          INTERPRETATION:  INDICATION: Persistent RLL atelectasis  TECHNIQUE: Unenhanced CT of the chest. Coronal, sagittal, and MIP images   were reconstructed and reviewed.  COMPARISON: No prior chest CT. CT abdomen and pelvis 6/11/2024    FINDINGS:    AIRWAYS, LUNGS, PLEURA: Patent central airways. Emphysema. There are   bilateral nodular opacities and bandlike consolidations within the lung   bases possibly infectious in etiology. No pleural effusion.    LYMPH NODES, MEDIASTINUM: No lymphadenopathy. Indeterminant 0.9 cm   anterior mediastinal nodule.    HEART, VESSELS: Heart size is normal. No pericardial effusion. Thoracic   aorta normal in diameter. Mitral annulus and coronary calcifications.   TAVR.    UPPER ABDOMEN: Indeterminant isodense material within gallbladder.   Gastrostomy.    CHEST WALL, BONES: No aggressive osseous lesion. Old fracture deformity   of multiple bilateral ribs and sternum.    LOWER NECK: Within normal limits.    IMPRESSION:    Bilateral nodular opacities and bandlike consolidations within the lung   bases possibly infectious in etiology. Recommend follow-up chest CT in   4-8 weeks to determine resolution.    Emphysema.    Indeterminant 0.9 cm anterior mediastinal nodule. Recommend evaluation   with CT chest without and with contrast anterior mediastinal lesion   protocol.    Indeterminant isodense material within gallbladder. Further evaluation   can be performed with sonogram.    < end of copied text >    < from: Xray Chest 1 View- PORTABLE-Urgent (Xray Chest 1 View- PORTABLE-Urgent .) (12.23.24 @ 11:01) >   XR CHEST PORTABLE URGENT 1V   ORDERED BY:  SOURAV SHEETS     PROCEDURE DATE:  12/23/2024      INTERPRETATION:  AP chest on December 23, 2024 at 10:51 AM. Patient has   wheezing.    Heart magnified by technique. TAVR aortic valve noted.    There is slight blunting right base laterally new since December 5, 2023.    IMPRESSION: Slight blunting of right base laterally new since prior.      CAPILLARY BLOOD GLUCOSE  POCT Blood Glucose.: 97 mg/dL (03 Jan 2025 08:12)  POCT Blood Glucose.: 132 mg/dL (02 Jan 2025 21:13)  POCT Blood Glucose.: 129 mg/dL (02 Jan 2025 16:37)  POCT Blood Glucose.: 109 mg/dL (02 Jan 2025 11:22)        MEDICATIONS  (STANDING):  albuterol/ipratropium for Nebulization 3 milliLiter(s) Nebulizer every 6 hours  aMIOdarone    Tablet 200 milliGRAM(s) Oral daily  AQUAPHOR (petrolatum Ointment) 1 Application(s) Topical two times a day  aspirin enteric coated 81 milliGRAM(s) Oral daily  buMETAnide 1 milliGRAM(s) Oral daily  clopidogrel Tablet 75 milliGRAM(s) Oral daily  dextrose 5%. 1000 milliLiter(s) (50 mL/Hr) IV Continuous <Continuous>  dextrose 5%. 1000 milliLiter(s) (100 mL/Hr) IV Continuous <Continuous>  dextrose 50% Injectable 25 Gram(s) IV Push once  dextrose 50% Injectable 12.5 Gram(s) IV Push once  dextrose 50% Injectable 25 Gram(s) IV Push once  escitalopram 20 milliGRAM(s) Oral at bedtime  fluticasone propionate/ salmeterol 250-50 MICROgram(s) Diskus 1 Dose(s) Inhalation two times a day  glucagon  Injectable 1 milliGRAM(s) IntraMuscular once  guaiFENesin  milliGRAM(s) Oral every 12 hours  hydrocodone/homatropine Syrup 5 milliLiter(s) Oral two times a day  insulin lispro (ADMELOG) corrective regimen sliding scale   SubCutaneous three times a day before meals  insulin lispro (ADMELOG) corrective regimen sliding scale   SubCutaneous at bedtime  linagliptin 5 milliGRAM(s) Oral daily  metFORMIN 500 milliGRAM(s) Oral two times a day with meals  metoprolol tartrate 12.5 milliGRAM(s) Oral two times a day  nystatin Powder 1 Application(s) Topical two times a day  pantoprazole    Tablet 40 milliGRAM(s) Oral before breakfast  tiotropium 2.5 MICROgram(s) Inhaler 2 Puff(s) Inhalation daily    MEDICATIONS  (PRN):  acetaminophen     Tablet .. 650 milliGRAM(s) Oral every 6 hours PRN Mild Pain (1 - 3)  benzonatate 100 milliGRAM(s) Oral three times a day PRN Cough  dextrose Oral Gel 15 Gram(s) Oral once PRN Blood Glucose LESS THAN 70 milliGRAM(s)/deciliter  guaiFENesin Oral Liquid (Sugar-Free) 100 milliGRAM(s) Oral every 6 hours PRN Cough  lidocaine 5% Ointment 1 Application(s) Topical three times a day PRN rib pain

## 2025-01-04 NOTE — PROGRESS NOTE ADULT - ASSESSMENT
79 year old, obese, female with PMH of DAYANA (on CPAP), aortic stenosis, HTN, HLD, current smoker, COPD, Type 2 Diabetes Mellitus, and non-alcoholic fatty liver disease; who presented to [facility] on 11/20 for a scheduled TAVR, and possible stent placement.  She reports several month history of DUFF while walking and climbing up stairs, fatigue and BL LE edema. On 11/20 she underwent TAVR, drainage of retroperitoneal hematoma and pericardial window with Dr. Garland and Noah. Intraoperatively, she had an episode of Vtach (on Amiodarone).    Her hospital course was complicated by the following: acute hypoxic respiratory failure (requiring BiPAP and eventual intubation on 11/25), hemorrhagic shock, AFIB w/ RVR, coffee ground emesis, Code Blue (s/p CPR resulting in rib fractures), AIDA, fever secondary to aspiration PNA, +pseudomonas in sputum, persistent leukocytosis and low grade fevers (on Vanco per ID), L lateral abdominal wall hematoma, distended gallbladder and gallbladder sludge, dysphagia (s/p trache and PEG placement on 12/3), decannulation (12/13), L groin delayed wound healing (requiring wound vac), and hyperglycemia.  Patient now admitted for a multidisciplinary rehab program. 12-19-24 @ 13:25  one to one for feeding after peg tube removal  subjective tremor will monitor    * Cognitive deficits - SLP following   * PEG -  PEG removal with GI toda  * Persistent Cough R/t lung atelectasis, hx of smoking and many pets at home--dogs, horses (on CPAP at home prior to acute hosp care, but no longer requiring it)  * CT chest (1/2) with BL nodules, consolidations, and .9cm anterior mediastinal mass - pulm recs appreciated - repeat CT Chest in 3-4 months   * Family training completed 1/2 - FL home for 1/7 with private hire aide    #Aortic valve disease S/p TAVR  - Gait Instability, ADL impairments and Functional impairments:   Continue Comprehensive Rehab Program of PT/OT/SLP  - 3 hours a day, 5 days a week  - ASA 81mg daily  - Plavix 75mg daily     REHAB ISSUES  Decreased ambulatory distance  Left groin wound  Impaired balance  Resp failure     #Acute Hypoxic Respiratory Failures   #Cough  (On nightly CPAP prior to acute care admission, but no longer requiring this all through admission)   - CXR with R lung base atelectasis  - Mucinex BID  - Chest PT   - Incentive Spirometer   - CT chest (1/2) emphysema, BL nodules, consolidations, and .9cm anterior mediastinal mass - pulm recs appreciated - repeat CT Chest in 3-4 months   - Advair  - Spiriva  - Duoneb every 6 hours  - Hycodan 5mL BID     #HTN  #AFIB w/ RVR  - Amiodarone 200mg daily   - Bumex 1mg daily   - Metoprolol 12.5mg BID     #Type 2 Diabetes Mellitus  - A1c: 6.4% (Dec 2024)    - FS AC & HS  - Mod ISS  - Lantus 12U HS    #Mood  - Escitalopram 20mg daily     #Skin - Trach site with healthy scab, open to air,  L groin wound- wet to dry packing with gauze, CDI, L flank bruising, generalized ecchymosis and scabbing, b/l dry feet  - Aquaphor BID   - Pressure injury/Skin: OOB to Chair, PT/OT   - Left groin wound - wound care recs appreciated   --Moisten dressing before removal, then daily Normal Saline Cleanse of Left groin wound, pat thoroughly dry.  Apply Cavillon film barrier daily to intact periwound skin, allow to dry.  Gently pack area daily with saline moistened non-woven gauze (half of one piece), cover with folded dry non-woven gauze, cover with Tegaderm.    #Pain Mgmt   - PRN: Tylenol     #GI/Bowel Mgmt   - Pantoprazole  - Senna , Miralax    #/Bladder Mgmt --voiding     #FEN   - Diet - Regular with Thin Liquids   - Dysphagia  SLP - evaluation and treatment  - S/p MBS on 12/23 - passed, diet upgraded (as above)  - Dysphagia--tolerating oral feeds, PEG placed 12/3 - GI recs outpatient PEG removal after 1/7, if still admitted after 12/31 may reassess PEG removal while in hospital   - PEG - possible PEG removal with GI on 1/3 - Plavix held prior to removal     #Precautions / PROPHYLAXIS:   - Falls, Cardiac  - ortho: Weight bearing status: WBAT   - Lungs: Aspiration, Incentive Spirometer   - DVT: ASA 81mg daily & Plavix 75mg daily, TEDs   ----------------------------------------------  Next of Kin:   Daughter: Janie Drake: [phone]    HEALTH MANAGEMENT   1/3--Patient reports that family has made arrangements for a live in care giver post discharge  They are also interested in the home care as referred    ----------------------------------------------  IDT 12/31  Therapy-- Ambulating 150ft Rollator, supervision, 8 stairs 1 hand rail supervision  SLP--Mod cog deficits, impulsivity,   Barriers--left groin wound, cognitive deficits  Est 1/4 vs RACHEL dependent on family impression post family training on 1/2  Daughter currently abroad and coming for family training 1/2  -----------------------------------------------  Dr. SMITH's Liaison with Family/Providers  1/2--Meeting at bedside with patient, daughter and another daughter engaged via phone. We discussed her current functional progress as noted in last IDT  Persisting cog deficits with impulsivity.  Options for d/c disposition, GI f/u for possible PEG removal  Imaging to clarify on persisting Rt lung atelectasis  Family training today, Family and patient to decide between home dc with home care services, d/c to one of her children's home temporarily or short term RACHEL placement   -----------------------------------------------    OUTPATIENT/FOLLOW UP:    Todd Devine MD  Vascular surgery, General surgery  [address]  [address]  [place] [address] [phone]    Todd Odom MD  Cardiology, Interventional  [facility]   [address]  [address]  [facility] [place], [address] [phone]

## 2025-01-04 NOTE — PROGRESS NOTE ADULT - NS ATTEND AMEND GEN_ALL_CORE FT
Patient seen and examined at the bedside. Agree with the assessment and plan of TIERRA Carpio.  PEG removed at bedside and dressing applied.  Can resume diet. Avoid excessive liquid consumption for the first 24 hours.  Recommend restarting Plavix today and observe for any significant bleeding at the site. D/W Dr Cole.

## 2025-01-04 NOTE — PROGRESS NOTE ADULT - PROBLEM SELECTOR PLAN 1
Patient is tolerating oral feeds, PEG is not being used as she is tolerating oral feeds.    Peg was removed fully intact with bumper  minimal bleeding noted, pressure applied, 4x4 dressing applied Patient is tolerating oral feeds, PEG is not being used as she is tolerating oral feeds.    With gentle traction and Surgilube applied, the 20 Fr PEG tube was removed with intact bumper. Pressure applied, minimal oozing of blood noted at the lumen. Dressing applied. Procedure tolerated well. Discussed with RN to monitor dressing.

## 2025-01-04 NOTE — PROGRESS NOTE ADULT - ASSESSMENT
79 year old patient, obese, female with PMH of DAYANA (on CPAP), aortic stenosis, HTN, HLD, current smoker, COPD, Type 2 Diabetes Mellitus, and non-alcoholic fatty liver disease; who presented to [facility] on 11/20 for a scheduled TAVR, and possible stent placement.   GI following for PEG removal

## 2025-01-05 LAB
GLUCOSE BLDC GLUCOMTR-MCNC: 103 MG/DL — HIGH (ref 70–99)
GLUCOSE BLDC GLUCOMTR-MCNC: 103 MG/DL — HIGH (ref 70–99)
GLUCOSE BLDC GLUCOMTR-MCNC: 110 MG/DL — HIGH (ref 70–99)
GLUCOSE BLDC GLUCOMTR-MCNC: 113 MG/DL — HIGH (ref 70–99)

## 2025-01-05 PROCEDURE — 99232 SBSQ HOSP IP/OBS MODERATE 35: CPT

## 2025-01-05 PROCEDURE — 99233 SBSQ HOSP IP/OBS HIGH 50: CPT

## 2025-01-05 RX ADMIN — ESCITALOPRAM OXALATE 20 MILLIGRAM(S): 10 TABLET ORAL at 21:04

## 2025-01-05 RX ADMIN — IPRATROPIUM BROMIDE AND ALBUTEROL SULFATE 3 MILLILITER(S): .5; 2.5 SOLUTION RESPIRATORY (INHALATION) at 19:57

## 2025-01-05 RX ADMIN — Medication 81 MILLIGRAM(S): at 11:58

## 2025-01-05 RX ADMIN — Medication 12.5 MILLIGRAM(S): at 05:16

## 2025-01-05 RX ADMIN — METFORMIN 500 MILLIGRAM(S): 850 TABLET ORAL at 17:50

## 2025-01-05 RX ADMIN — Medication 12.5 MILLIGRAM(S): at 17:50

## 2025-01-05 RX ADMIN — IPRATROPIUM BROMIDE AND ALBUTEROL SULFATE 3 MILLILITER(S): .5; 2.5 SOLUTION RESPIRATORY (INHALATION) at 04:59

## 2025-01-05 RX ADMIN — Medication 1 APPLICATION(S): at 17:51

## 2025-01-05 RX ADMIN — Medication 600 MILLIGRAM(S): at 17:51

## 2025-01-05 RX ADMIN — NYSTATIN TOPICAL POWDER 1 APPLICATION(S): 100000 POWDER TOPICAL at 05:16

## 2025-01-05 RX ADMIN — TIOTROPIUM BROMIDE MONOHYDRATE 2 PUFF(S): 18 CAPSULE ORAL; RESPIRATORY (INHALATION) at 08:34

## 2025-01-05 RX ADMIN — IPRATROPIUM BROMIDE AND ALBUTEROL SULFATE 3 MILLILITER(S): .5; 2.5 SOLUTION RESPIRATORY (INHALATION) at 08:32

## 2025-01-05 RX ADMIN — BUMETANIDE 1 MILLIGRAM(S): 2 TABLET ORAL at 05:16

## 2025-01-05 RX ADMIN — FLUTICASONE PROPIONATE AND SALMETEROL 1 DOSE(S): 50; 500 POWDER ORAL; RESPIRATORY (INHALATION) at 08:32

## 2025-01-05 RX ADMIN — PANTOPRAZOLE 40 MILLIGRAM(S): 40 TABLET, DELAYED RELEASE ORAL at 05:16

## 2025-01-05 RX ADMIN — LINAGLIPTIN 5 MILLIGRAM(S): 5 TABLET, FILM COATED ORAL at 11:58

## 2025-01-05 RX ADMIN — AMIODARONE HYDROCHLORIDE 200 MILLIGRAM(S): 200 TABLET ORAL at 05:16

## 2025-01-05 RX ADMIN — NYSTATIN TOPICAL POWDER 1 APPLICATION(S): 100000 POWDER TOPICAL at 17:51

## 2025-01-05 RX ADMIN — FLUTICASONE PROPIONATE AND SALMETEROL 1 DOSE(S): 50; 500 POWDER ORAL; RESPIRATORY (INHALATION) at 19:59

## 2025-01-05 RX ADMIN — Medication 600 MILLIGRAM(S): at 05:16

## 2025-01-05 RX ADMIN — CLOPIDOGREL BISULFATE 75 MILLIGRAM(S): 75 TABLET, FILM COATED ORAL at 11:57

## 2025-01-05 RX ADMIN — METFORMIN 500 MILLIGRAM(S): 850 TABLET ORAL at 08:17

## 2025-01-05 RX ADMIN — IPRATROPIUM BROMIDE AND ALBUTEROL SULFATE 3 MILLILITER(S): .5; 2.5 SOLUTION RESPIRATORY (INHALATION) at 16:00

## 2025-01-05 NOTE — PROGRESS NOTE ADULT - NS ATTEND AMEND GEN_ALL_CORE FT
Agree with the assessment and plan of TIERRA Carpio.  Doing well after PEG removal.  Clopidogrel restarted. Watch dressing for bleeding.    GI will sign off. Please reconsult as needed. Thank you.

## 2025-01-05 NOTE — PROGRESS NOTE ADULT - ASSESSMENT
79 year old, obese, female with PMH of DAYANA (on CPAP), aortic stenosis, HTN, HLD, current smoker, COPD, Type 2 Diabetes Mellitus, and non-alcoholic fatty liver disease; who presented to [facility] on 11/20 for scheduled TAVR, s/p drainage of retroperitoneal hematoma and pericardial window with Dr. Garland and Noah. Intraoperatively, she had an episode of Vtach (on Amiodarone).    Her hospital course was complicated by the following: acute hypoxic respiratory failure (requiring BiPAP and eventual intubation on 11/25), hemorrhagic shock, AFIB w/ RVR, coffee ground emesis, Code Blue (s/p CPR resulting in rib fractures), AIDA, fever secondary to aspiration PNA, +pseudomonas in sputum, persistent leukocytosis and low grade fevers (on Vanco per ID), L lateral abdominal wall hematoma, distended gallbladder and gallbladder sludge, dysphagia (s/p trach and PEG placement on 12/3), decannulation (12/13), L groin delayed wound healing (required wound vac), and hyperglycemia.  Patient now admitted for a multidisciplinary rehab program. 12-19-24 @ 13:25    #Aortic stenosis S/p TAVR 11/20  - s/p drainage of retroperitoneal hematoma and pericardial window  - Continue aspirin and plavix  - Continue comprehensive rehab program with PT/OT  - Outpatient follow up with cardiology     #HTN  #AFIB w/ RVR  #Intra-op VTach  #History of Cardiac Arrest   - EKG 12/26 showed NSR w/ 1st degree AVB  - Continue Amiodarone 200mg daily   - Continue Metoprolol 12.5mg BID   - Continue Bumex 1 mg daily  - No therapeutic AC as pt had massive transfusion protocol at [place]  - Outpatient follow up with cardiology     #Type 2 Diabetes Mellitus  - A1c: 6.4% (Dec 2024)    - Continue metformin and tradjenta  - RAISS  - Monitor FS, at goal    #Acute Hypoxic Respiratory Failures (Resolved)  #Status Post Trach now Decannulated (12/3)  #Underlying DAYANA and COPD   - CT chest 1/2 showed Emphysema, Bilateral nodular opacities and bandlike consolidations within the lung bases possibly infectious in etiology. Recommend follow-up chest CT in 4-8 weeks to determine resolution. Also noted Indeterminant 0.9 cm anterior mediastinal nodule.  - Monitor off Abx  - Encouraged incentive spirometry use  - Continue advair and spiriva   - Continue duoneb Q6H  - Antitussives   - Pulm following    #Dysphagia s/p PEG (Removed 1/4)  - Local wound care to PEG removal site, monitor dressing  - Tolerating oral  - SLP following    #Mood  - Escitalopram 20mg daily     #Left Groin Wound  -Healing well  -Continue local wound care     #GI PPx  -PPI    #DVT PPx  -TEDS    Discussed with rehab team

## 2025-01-05 NOTE — PROGRESS NOTE ADULT - ASSESSMENT
79 year old, obese, female with PMH of DAYANA (on CPAP), aortic stenosis, HTN, HLD, current smoker, COPD, Type 2 Diabetes Mellitus, and non-alcoholic fatty liver disease; who presented to [facility] on 11/20 for a scheduled TAVR, and possible stent placement.    GI following for removal of PEG   79 year old, obese, female with PMH of DAYANA (on CPAP), aortic stenosis, HTN, HLD, current smoker, COPD, Type 2 Diabetes Mellitus, and non-alcoholic fatty liver disease; who presented to [facility] on 11/20 for a scheduled TAVR, and possible stent placement.    GI following for removal of PEG. Now s/p PEG removal yesterday at bedside, tolerated well.

## 2025-01-05 NOTE — PROGRESS NOTE ADULT - SUBJECTIVE AND OBJECTIVE BOX
Follow-up Pulmonary Progress Note  Chief Complaint : Presence of xenogenic heart valve      patient seen and examined  comfortable  on room air  states not used overnight cpap   risks/benefits of use discussed  feeling better, no complaints  states going home in upcoming day        Allergies :penicillins (Hives)  Tetanus Immune Globulin (Unknown)  adhesives (Rash)  Typhoid Vaccines (Anaphylaxis)      PAST MEDICAL & SURGICAL HISTORY:  Aortic stenosis  Chronic obstructive pulmonary disease  Type 2 diabetes mellitus  Non-alcoholic fatty liver disease  HLD (hyperlipidemia)  HTN (hypertension)  Obstructive sleep apnea on CPAP        Medications:  MEDICATIONS  (STANDING):  albuterol/ipratropium for Nebulization 3 milliLiter(s) Nebulizer every 6 hours  aMIOdarone    Tablet 200 milliGRAM(s) Oral daily  AQUAPHOR (petrolatum Ointment) 1 Application(s) Topical two times a day  aspirin enteric coated 81 milliGRAM(s) Oral daily  buMETAnide 1 milliGRAM(s) Oral daily  clopidogrel Tablet 75 milliGRAM(s) Oral daily  dextrose 5%. 1000 milliLiter(s) (100 mL/Hr) IV Continuous <Continuous>  dextrose 5%. 1000 milliLiter(s) (50 mL/Hr) IV Continuous <Continuous>  dextrose 50% Injectable 25 Gram(s) IV Push once  dextrose 50% Injectable 12.5 Gram(s) IV Push once  dextrose 50% Injectable 25 Gram(s) IV Push once  escitalopram 20 milliGRAM(s) Oral at bedtime  fluticasone propionate/ salmeterol 250-50 MICROgram(s) Diskus 1 Dose(s) Inhalation two times a day  glucagon  Injectable 1 milliGRAM(s) IntraMuscular once  guaiFENesin  milliGRAM(s) Oral every 12 hours  hydrocodone/homatropine Syrup 5 milliLiter(s) Oral two times a day  insulin lispro (ADMELOG) corrective regimen sliding scale   SubCutaneous three times a day before meals  insulin lispro (ADMELOG) corrective regimen sliding scale   SubCutaneous at bedtime  linagliptin 5 milliGRAM(s) Oral daily  metFORMIN 500 milliGRAM(s) Oral two times a day with meals  metoprolol tartrate 12.5 milliGRAM(s) Oral two times a day  nystatin Powder 1 Application(s) Topical two times a day  pantoprazole    Tablet 40 milliGRAM(s) Oral before breakfast  tiotropium 2.5 MICROgram(s) Inhaler 2 Puff(s) Inhalation daily    MEDICATIONS  (PRN):  acetaminophen     Tablet .. 650 milliGRAM(s) Oral every 6 hours PRN Mild Pain (1 - 3)  benzonatate 100 milliGRAM(s) Oral three times a day PRN Cough  dextrose Oral Gel 15 Gram(s) Oral once PRN Blood Glucose LESS THAN 70 milliGRAM(s)/deciliter  guaiFENesin Oral Liquid (Sugar-Free) 100 milliGRAM(s) Oral every 6 hours PRN Cough  lidocaine 5% Ointment 1 Application(s) Topical three times a day PRN rib pain         RADIOLOGY  CXR:      CT:    ACC: [id number] EXAM:  CT CHEST   ORDERED BY:  SOURAV SHEETS     PROCEDURE DATE:  01/02/2025          INTERPRETATION:  INDICATION: Persistent RLL atelectasis  TECHNIQUE: Unenhanced CT of the chest. Coronal, sagittal, and MIP images   were reconstructed and reviewed.  COMPARISON: No prior chest CT. CT abdomen and pelvis 6/11/2024    FINDINGS:    AIRWAYS, LUNGS, PLEURA: Patent central airways. Emphysema. There are   bilateral nodular opacities and bandlike consolidations within the lung   bases possibly infectious in etiology. No pleural effusion.    LYMPH NODES, MEDIASTINUM: No lymphadenopathy. Indeterminant 0.9 cm   anterior mediastinal nodule.    HEART, VESSELS: Heart size is normal. No pericardial effusion. Thoracic   aorta normal in diameter. Mitral annulus and coronary calcifications.   TAVR.    UPPER ABDOMEN: Indeterminant isodense material within gallbladder.   Gastrostomy.    CHEST WALL, BONES: No aggressive osseous lesion. Old fracture deformity   of multiple bilateral ribs and sternum.    LOWER NECK: Within normal limits.    IMPRESSION:    Bilateral nodular opacities and bandlike consolidations within the lung   bases possibly infectious in etiology. Recommend follow-up chest CT in   4-8 weeks to determine resolution.    Emphysema.    Indeterminant 0.9 cm anterior mediastinal nodule. Recommend evaluation   with CT chest without and with contrast anterior mediastinal lesion   protocol.    Indeterminant isodense material within gallbladder. Further evaluation   can be performed with sonogram.    --- End of Report ---    ECHO:      VITALS:  T(C): 36.9 (01-05-25 @ 08:10), Max: 36.9 (01-05-25 @ 08:10)  T(F): 98.4 (01-05-25 @ 08:10), Max: 98.4 (01-05-25 @ 08:10)  HR: 71 (01-05-25 @ 08:41) (71 - 84)  BP: 146/85 (01-05-25 @ 08:10) (131/76 - 146/85)  BP(mean): --  ABP: --  ABP(mean): --  RR: 16 (01-05-25 @ 08:10) (16 - 16)  SpO2: 93% (01-05-25 @ 08:41) (93% - 99%)  CVP(mm Hg): --  CVP(cm H2O): --    Ins and Outs

## 2025-01-05 NOTE — PROGRESS NOTE ADULT - SUBJECTIVE AND OBJECTIVE BOX
CC: S/p TAVR     Today's Subjective & Objective Findings:  Patient seen and examined at bedside this AM.  no overnight issues   pt will get peg tube removed yesterday      no N V SOB      Vital Signs Last 24 Hrs  T(C): 36.9 (05 Jan 2025 08:10), Max: 36.9 (05 Jan 2025 08:10)  T(F): 98.4 (05 Jan 2025 08:10), Max: 98.4 (05 Jan 2025 08:10)  HR: 71 (05 Jan 2025 08:41) (71 - 84)  BP: 146/85 (05 Jan 2025 08:10) (131/76 - 146/85)  BP(mean): --  RR: 16 (05 Jan 2025 08:10) (16 - 16)  SpO2: 93% (05 Jan 2025 08:41) (93% - 99%)    Parameters below as of 05 Jan 2025 08:41  Patient On (Oxygen Delivery Method): room air        PHYSICAL EXAM  Constitutional -Comfortable  Neck - Supple  Cardiovascular - Palpable peripheral pulses   Respiratory/Chest- decreased air entry b/l Rt lower lobes and few rhonchi. normal air entry left lobes   Abdomen - Soft, Non-tender, mild erythema around PEG   Extremities - No cyanosis, No edema,     Neurologic Exam - Alert, Oriented  Sensory - Light touch sensation intact  Motor Function - Moves all extremities spontaneously  Skin -    L groin wound- wet to dry packing with gauze,     LABS             9.0    6.76  )-----------( 217      ( 02 Jan 2025 07:00 )             28.6     01-02    141  |  105  |  10  ----------------------------<  101[H]  4.5   |  29  |  0.57    Ca    9.0      02 Jan 2025 07:00    TPro  6.5  /  Alb  2.2[L]  /  TBili  0.2  /  DBili  x   /  AST  16  /  ALT  17  /  AlkPhos  126[H]  01-02      Urinalysis Basic - ( 02 Jan 2025 07:00 )    Color: x / Appearance: x / SG: x / pH: x  Gluc: 101 mg/dL / Ketone: x  / Bili: x / Urobili: x   Blood: x / Protein: x / Nitrite: x   Leuk Esterase: x / RBC: x / WBC x   Sq Epi: x / Non Sq Epi: x / Bacteria: x        < from: CT Chest No Cont (01.02.25 @ 11:40) >  CT CHEST   ORDERED BY:  SOURAV SHEETS     PROCEDURE DATE:  01/02/2025          INTERPRETATION:  INDICATION: Persistent RLL atelectasis  TECHNIQUE: Unenhanced CT of the chest. Coronal, sagittal, and MIP images   were reconstructed and reviewed.  COMPARISON: No prior chest CT. CT abdomen and pelvis 6/11/2024    FINDINGS:    AIRWAYS, LUNGS, PLEURA: Patent central airways. Emphysema. There are   bilateral nodular opacities and bandlike consolidations within the lung   bases possibly infectious in etiology. No pleural effusion.    LYMPH NODES, MEDIASTINUM: No lymphadenopathy. Indeterminant 0.9 cm   anterior mediastinal nodule.    HEART, VESSELS: Heart size is normal. No pericardial effusion. Thoracic   aorta normal in diameter. Mitral annulus and coronary calcifications.   TAVR.    UPPER ABDOMEN: Indeterminant isodense material within gallbladder.   Gastrostomy.    CHEST WALL, BONES: No aggressive osseous lesion. Old fracture deformity   of multiple bilateral ribs and sternum.    LOWER NECK: Within normal limits.    IMPRESSION:    Bilateral nodular opacities and bandlike consolidations within the lung   bases possibly infectious in etiology. Recommend follow-up chest CT in   4-8 weeks to determine resolution.    Emphysema.    Indeterminant 0.9 cm anterior mediastinal nodule. Recommend evaluation   with CT chest without and with contrast anterior mediastinal lesion   protocol.    Indeterminant isodense material within gallbladder. Further evaluation   can be performed with sonogram.    < end of copied text >    < from: Xray Chest 1 View- PORTABLE-Urgent (Xray Chest 1 View- PORTABLE-Urgent .) (12.23.24 @ 11:01) >   XR CHEST PORTABLE URGENT 1V   ORDERED BY:  SOURAV SHEETS     PROCEDURE DATE:  12/23/2024      INTERPRETATION:  AP chest on December 23, 2024 at 10:51 AM. Patient has   wheezing.    Heart magnified by technique. TAVR aortic valve noted.    There is slight blunting right base laterally new since December 5, 2023.    IMPRESSION: Slight blunting of right base laterally new since prior.      CAPILLARY BLOOD GLUCOSE  POCT Blood Glucose.: 97 mg/dL (03 Jan 2025 08:12)  POCT Blood Glucose.: 132 mg/dL (02 Jan 2025 21:13)  POCT Blood Glucose.: 129 mg/dL (02 Jan 2025 16:37)  POCT Blood Glucose.: 109 mg/dL (02 Jan 2025 11:22)        MEDICATIONS  (STANDING):  albuterol/ipratropium for Nebulization 3 milliLiter(s) Nebulizer every 6 hours  aMIOdarone    Tablet 200 milliGRAM(s) Oral daily  AQUAPHOR (petrolatum Ointment) 1 Application(s) Topical two times a day  aspirin enteric coated 81 milliGRAM(s) Oral daily  buMETAnide 1 milliGRAM(s) Oral daily  clopidogrel Tablet 75 milliGRAM(s) Oral daily  dextrose 5%. 1000 milliLiter(s) (50 mL/Hr) IV Continuous <Continuous>  dextrose 5%. 1000 milliLiter(s) (100 mL/Hr) IV Continuous <Continuous>  dextrose 50% Injectable 25 Gram(s) IV Push once  dextrose 50% Injectable 12.5 Gram(s) IV Push once  dextrose 50% Injectable 25 Gram(s) IV Push once  escitalopram 20 milliGRAM(s) Oral at bedtime  fluticasone propionate/ salmeterol 250-50 MICROgram(s) Diskus 1 Dose(s) Inhalation two times a day  glucagon  Injectable 1 milliGRAM(s) IntraMuscular once  guaiFENesin  milliGRAM(s) Oral every 12 hours  hydrocodone/homatropine Syrup 5 milliLiter(s) Oral two times a day  insulin lispro (ADMELOG) corrective regimen sliding scale   SubCutaneous three times a day before meals  insulin lispro (ADMELOG) corrective regimen sliding scale   SubCutaneous at bedtime  linagliptin 5 milliGRAM(s) Oral daily  metFORMIN 500 milliGRAM(s) Oral two times a day with meals  metoprolol tartrate 12.5 milliGRAM(s) Oral two times a day  nystatin Powder 1 Application(s) Topical two times a day  pantoprazole    Tablet 40 milliGRAM(s) Oral before breakfast  tiotropium 2.5 MICROgram(s) Inhaler 2 Puff(s) Inhalation daily    MEDICATIONS  (PRN):  acetaminophen     Tablet .. 650 milliGRAM(s) Oral every 6 hours PRN Mild Pain (1 - 3)  benzonatate 100 milliGRAM(s) Oral three times a day PRN Cough  dextrose Oral Gel 15 Gram(s) Oral once PRN Blood Glucose LESS THAN 70 milliGRAM(s)/deciliter  guaiFENesin Oral Liquid (Sugar-Free) 100 milliGRAM(s) Oral every 6 hours PRN Cough  lidocaine 5% Ointment 1 Application(s) Topical three times a day PRN rib pain

## 2025-01-05 NOTE — PROGRESS NOTE ADULT - SUBJECTIVE AND OBJECTIVE BOX
INTERVAL HPI/OVERNIGHT EVENTS:  HPI: Marysol Drake is a 79 year old, obese, female with PMH of DAYANA (on CPAP), aortic stenosis, HTN, HLD, current smoker, COPD, Type 2 Diabetes Mellitus, and non-alcoholic fatty liver disease; who presented to [facility] on 11/20 for a scheduled TAVR, and possible stent placement.  She reports several month history of DUFF while walking and climbing up stairs, fatigue and BL LE edema. On 11/20 she underwent TAVR, drainage of retroperitoneal hematoma and pericardial window with Dr. Garland and Noah. Intraoperatively, she had an episode of Vtach (on Amiodarone).  Her hospital course was complicated by the following: acute hypoxic respiratory failure (requiring BiPAP and eventual intubation on 11/25), hemorrhagic shock, AFIB w/ RVR, coffee ground emesis, Code Blue (s/p CPR resulting in rib fractures), AIDA, fever secondary to aspiration PNA, +pseudomonas in sputum, persistent leukocytosis and low grade fevers (on Vanco per ID), L lateral abdominal wall hematoma, distended gallbladder and gallbladder sludge, dysphagia (s/p trache and PEG placement on 12/3), decannulation (12/13), L groin delayed wound healing (requiring wound vac), and hyperglycemia. PM&R was consulted and deemed her an appropriate candidate for IRF. She was medically stabilized and cleared for discharge to [facility].  (19 Dec 2024 13:21).    GI Note: Patient seen and examined. Resting. no overnight events reported.    MEDICATIONS  (STANDING):  albuterol/ipratropium for Nebulization 3 milliLiter(s) Nebulizer every 6 hours  aMIOdarone    Tablet 200 milliGRAM(s) Oral daily  AQUAPHOR (petrolatum Ointment) 1 Application(s) Topical two times a day  aspirin enteric coated 81 milliGRAM(s) Oral daily  buMETAnide 1 milliGRAM(s) Oral daily  clopidogrel Tablet 75 milliGRAM(s) Oral daily  dextrose 5%. 1000 milliLiter(s) (50 mL/Hr) IV Continuous <Continuous>  dextrose 5%. 1000 milliLiter(s) (100 mL/Hr) IV Continuous <Continuous>  dextrose 50% Injectable 25 Gram(s) IV Push once  dextrose 50% Injectable 12.5 Gram(s) IV Push once  dextrose 50% Injectable 25 Gram(s) IV Push once  escitalopram 20 milliGRAM(s) Oral at bedtime  fluticasone propionate/ salmeterol 250-50 MICROgram(s) Diskus 1 Dose(s) Inhalation two times a day  glucagon  Injectable 1 milliGRAM(s) IntraMuscular once  guaiFENesin  milliGRAM(s) Oral every 12 hours  hydrocodone/homatropine Syrup 5 milliLiter(s) Oral two times a day  insulin lispro (ADMELOG) corrective regimen sliding scale   SubCutaneous three times a day before meals  insulin lispro (ADMELOG) corrective regimen sliding scale   SubCutaneous at bedtime  linagliptin 5 milliGRAM(s) Oral daily  metFORMIN 500 milliGRAM(s) Oral two times a day with meals  metoprolol tartrate 12.5 milliGRAM(s) Oral two times a day  nystatin Powder 1 Application(s) Topical two times a day  pantoprazole    Tablet 40 milliGRAM(s) Oral before breakfast  tiotropium 2.5 MICROgram(s) Inhaler 2 Puff(s) Inhalation daily    MEDICATIONS  (PRN):  acetaminophen     Tablet .. 650 milliGRAM(s) Oral every 6 hours PRN Mild Pain (1 - 3)  benzonatate 100 milliGRAM(s) Oral three times a day PRN Cough  dextrose Oral Gel 15 Gram(s) Oral once PRN Blood Glucose LESS THAN 70 milliGRAM(s)/deciliter  guaiFENesin Oral Liquid (Sugar-Free) 100 milliGRAM(s) Oral every 6 hours PRN Cough  lidocaine 5% Ointment 1 Application(s) Topical three times a day PRN rib pain      Allergies    penicillins (Hives)  Tetanus Immune Globulin (Unknown)  adhesives (Rash)  Typhoid Vaccines (Anaphylaxis)    Intolerances        PAST MEDICAL & SURGICAL HISTORY:  Aortic stenosis      Chronic obstructive pulmonary disease      Type 2 diabetes mellitus      Non-alcoholic fatty liver disease      HLD (hyperlipidemia)      HTN (hypertension)      Obstructive sleep apnea on CPAP          REVIEW OF SYSTEMS    Negative unless indicated in HPI        PHYSICAL EXAM:   Vital Signs:  Vital Signs Last 24 Hrs  T(C): 36.9 (05 Jan 2025 08:10), Max: 36.9 (05 Jan 2025 08:10)  T(F): 98.4 (05 Jan 2025 08:10), Max: 98.4 (05 Jan 2025 08:10)  HR: 71 (05 Jan 2025 08:41) (71 - 84)  BP: 146/85 (05 Jan 2025 08:10) (131/76 - 146/85)  BP(mean): --  RR: 16 (05 Jan 2025 08:10) (16 - 16)  SpO2: 93% (05 Jan 2025 08:41) (93% - 99%)    Parameters below as of 05 Jan 2025 08:41  Patient On (Oxygen Delivery Method): room air      Daily     Daily I&O's Summary      GENERAL:  Appears stated age, well-groomed, well-nourished, no distress  HEENT:  NC/AT,    CHEST:  Full & symmetric excursion, no increased effort,   HEART:  Regular rhythm,  ABDOMEN:  Soft, non-tender, non-distended, normoactive bowel sounds,   EXTEREMITIES:  no cyanosis, clubbing or edema  SKIN:  warm/dry  NEURO:  Alert, oriented x3      LABS:              amylase   lipase  RADIOLOGY & ADDITIONAL TESTS:   GI follow up      Patient seen and examined. Resting in bed, no overnight events reported. S/P PEG removal yesterday, no bleeding.      MEDICATIONS  (STANDING):  albuterol/ipratropium for Nebulization 3 milliLiter(s) Nebulizer every 6 hours  aMIOdarone    Tablet 200 milliGRAM(s) Oral daily  AQUAPHOR (petrolatum Ointment) 1 Application(s) Topical two times a day  aspirin enteric coated 81 milliGRAM(s) Oral daily  buMETAnide 1 milliGRAM(s) Oral daily  clopidogrel Tablet 75 milliGRAM(s) Oral daily  dextrose 5%. 1000 milliLiter(s) (50 mL/Hr) IV Continuous <Continuous>  dextrose 5%. 1000 milliLiter(s) (100 mL/Hr) IV Continuous <Continuous>  dextrose 50% Injectable 25 Gram(s) IV Push once  dextrose 50% Injectable 12.5 Gram(s) IV Push once  dextrose 50% Injectable 25 Gram(s) IV Push once  escitalopram 20 milliGRAM(s) Oral at bedtime  fluticasone propionate/ salmeterol 250-50 MICROgram(s) Diskus 1 Dose(s) Inhalation two times a day  glucagon  Injectable 1 milliGRAM(s) IntraMuscular once  guaiFENesin  milliGRAM(s) Oral every 12 hours  hydrocodone/homatropine Syrup 5 milliLiter(s) Oral two times a day  insulin lispro (ADMELOG) corrective regimen sliding scale   SubCutaneous three times a day before meals  insulin lispro (ADMELOG) corrective regimen sliding scale   SubCutaneous at bedtime  linagliptin 5 milliGRAM(s) Oral daily  metFORMIN 500 milliGRAM(s) Oral two times a day with meals  metoprolol tartrate 12.5 milliGRAM(s) Oral two times a day  nystatin Powder 1 Application(s) Topical two times a day  pantoprazole    Tablet 40 milliGRAM(s) Oral before breakfast  tiotropium 2.5 MICROgram(s) Inhaler 2 Puff(s) Inhalation daily    MEDICATIONS  (PRN):  acetaminophen     Tablet .. 650 milliGRAM(s) Oral every 6 hours PRN Mild Pain (1 - 3)  benzonatate 100 milliGRAM(s) Oral three times a day PRN Cough  dextrose Oral Gel 15 Gram(s) Oral once PRN Blood Glucose LESS THAN 70 milliGRAM(s)/deciliter  guaiFENesin Oral Liquid (Sugar-Free) 100 milliGRAM(s) Oral every 6 hours PRN Cough  lidocaine 5% Ointment 1 Application(s) Topical three times a day PRN rib pain      Allergies    penicillins (Hives)  Tetanus Immune Globulin (Unknown)  adhesives (Rash)  Typhoid Vaccines (Anaphylaxis)    Intolerances            PHYSICAL EXAM:   Vital Signs:  Vital Signs Last 24 Hrs  T(C): 36.9 (05 Jan 2025 08:10), Max: 36.9 (05 Jan 2025 08:10)  T(F): 98.4 (05 Jan 2025 08:10), Max: 98.4 (05 Jan 2025 08:10)  HR: 71 (05 Jan 2025 08:41) (71 - 84)  BP: 146/85 (05 Jan 2025 08:10) (131/76 - 146/85)  BP(mean): --  RR: 16 (05 Jan 2025 08:10) (16 - 16)  SpO2: 93% (05 Jan 2025 08:41) (93% - 99%)    Parameters below as of 05 Jan 2025 08:41  Patient On (Oxygen Delivery Method): room air        GENERAL:  NAD  HEENT:  NC/AT    CHEST:  Full & symmetric excursion, no increased effort,   HEART:  Regular rhythm,  ABDOMEN:  Soft, non-tender, non-distended, +bowel sounds. Dressing without blood.  EXTREMITIES:  no cyanosis, clubbing or edema  SKIN:  warm/dry  NEURO:  Alert, oriented x3        LABS:  no new labs today

## 2025-01-05 NOTE — PROGRESS NOTE ADULT - ASSESSMENT
79 year old, obese, female with PMH of DAYANA (on CPAP), aortic stenosis, HTN, HLD, current smoker, COPD, Type 2 Diabetes Mellitus, and non-alcoholic fatty liver disease; who presented to [facility] on 11/20 for a scheduled TAVR, and possible stent placement.  She reports several month history of DUFF while walking and climbing up stairs, fatigue and BL LE edema. On 11/20 she underwent TAVR, drainage of retroperitoneal hematoma and pericardial window with Dr. Garland and Noah. Intraoperatively, she had an episode of Vtach (on Amiodarone).    Her hospital course was complicated by the following: acute hypoxic respiratory failure (requiring BiPAP and eventual intubation on 11/25), hemorrhagic shock, AFIB w/ RVR, coffee ground emesis, Code Blue (s/p CPR resulting in rib fractures), AIDA, fever secondary to aspiration PNA, +pseudomonas in sputum, persistent leukocytosis and low grade fevers (on Vanco per ID), L lateral abdominal wall hematoma, distended gallbladder and gallbladder sludge, dysphagia (s/p trache and PEG placement on 12/3), decannulation (12/13), L groin delayed wound healing (requiring wound vac), and hyperglycemia.  Patient now admitted for a multidisciplinary rehab program. 12-19-24 @ 13:25  one to one for feeding after peg tube removal  subjective tremor will monitor    * Cognitive deficits - SLP following   * PEG -  PEG removal with GI toda  * Persistent Cough R/t lung atelectasis, hx of smoking and many pets at home--dogs, horses (on CPAP at home prior to acute hosp care, but no longer requiring it)  * CT chest (1/2) with BL nodules, consolidations, and .9cm anterior mediastinal mass - pulm recs appreciated - repeat CT Chest in 3-4 months   * Family training completed 1/2 - ID home for 1/7 with private hire aide    #Aortic valve disease S/p TAVR  - Gait Instability, ADL impairments and Functional impairments:   Continue Comprehensive Rehab Program of PT/OT/SLP  - 3 hours a day, 5 days a week  - ASA 81mg daily  - Plavix 75mg daily     REHAB ISSUES  Decreased ambulatory distance  Left groin wound  Impaired balance  Resp failure     #Acute Hypoxic Respiratory Failures   #Cough  (On nightly CPAP prior to acute care admission, but no longer requiring this all through admission)   - CXR with R lung base atelectasis  - Mucinex BID  - Chest PT   - Incentive Spirometer   - CT chest (1/2) emphysema, BL nodules, consolidations, and .9cm anterior mediastinal mass - pulm recs appreciated - repeat CT Chest in 3-4 months   - Advair  - Spiriva  - Duoneb every 6 hours  - Hycodan 5mL BID     #HTN  #AFIB w/ RVR  - Amiodarone 200mg daily   - Bumex 1mg daily   - Metoprolol 12.5mg BID     #Type 2 Diabetes Mellitus  - A1c: 6.4% (Dec 2024)    - FS AC & HS  - Mod ISS  - Lantus 12U HS    #Mood  - Escitalopram 20mg daily     #Skin - Trach site with healthy scab, open to air,  L groin wound- wet to dry packing with gauze, CDI, L flank bruising, generalized ecchymosis and scabbing, b/l dry feet  - Aquaphor BID   - Pressure injury/Skin: OOB to Chair, PT/OT   - Left groin wound - wound care recs appreciated   --Moisten dressing before removal, then daily Normal Saline Cleanse of Left groin wound, pat thoroughly dry.  Apply Cavillon film barrier daily to intact periwound skin, allow to dry.  Gently pack area daily with saline moistened non-woven gauze (half of one piece), cover with folded dry non-woven gauze, cover with Tegaderm.    #Pain Mgmt   - PRN: Tylenol     #GI/Bowel Mgmt   - Pantoprazole  - Senna , Miralax    #/Bladder Mgmt --voiding     #FEN   - Diet - Regular with Thin Liquids   - Dysphagia  SLP - evaluation and treatment  - S/p MBS on 12/23 - passed, diet upgraded (as above)  - Dysphagia--tolerating oral feeds, PEG placed 12/3 - GI recs outpatient PEG removal after 1/7, if still admitted after 12/31 may reassess PEG removal while in hospital   - PEG - possible PEG removal with GI on 1/3 - Plavix held prior to removal     #Precautions / PROPHYLAXIS:   - Falls, Cardiac  - ortho: Weight bearing status: WBAT   - Lungs: Aspiration, Incentive Spirometer   - DVT: ASA 81mg daily & Plavix 75mg daily, TEDs   ----------------------------------------------  Next of Kin:   Daughter: Janie Drake: [phone]    HEALTH MANAGEMENT   1/3--Patient reports that family has made arrangements for a live in care giver post discharge  They are also interested in the home care as referred    ----------------------------------------------  IDT 12/31  Therapy-- Ambulating 150ft Rollator, supervision, 8 stairs 1 hand rail supervision  SLP--Mod cog deficits, impulsivity,   Barriers--left groin wound, cognitive deficits  Est 1/4 vs RACHEL dependent on family impression post family training on 1/2  Daughter currently abroad and coming for family training 1/2  -----------------------------------------------  Dr. SMITH's Liaison with Family/Providers  1/2--Meeting at bedside with patient, daughter and another daughter engaged via phone. We discussed her current functional progress as noted in last IDT  Persisting cog deficits with impulsivity.  Options for d/c disposition, GI f/u for possible PEG removal  Imaging to clarify on persisting Rt lung atelectasis  Family training today, Family and patient to decide between home dc with home care services, d/c to one of her children's home temporarily or short term RACHEL placement   -----------------------------------------------    OUTPATIENT/FOLLOW UP:    Todd Devine MD  Vascular surgery, General surgery  [address]  [address]  [place] [address] [phone]    Todd Odom MD  Cardiology, Interventional  [facility]   [address]  [address]  [place], [address] [phone]

## 2025-01-05 NOTE — PROGRESS NOTE ADULT - TIME BILLING
Time spent includes direct patient care  (interview and examination of patient), discussion with other providers, support staff and/or patient's family members, review of medical records, ordering diagnostic tests and analyzing results, and documentation.
medical complexity
Time spent includes direct patient care (interview and examination of patient), discussion with other providers, support staff and/or patient's family members, review of medical records, ordering diagnostic tests and analyzing results, and documentation
MEDICAL COMPLEXITY
MEDICAL COMPLEXITY
Time spent includes direct patient care  (interview and examination of patient), discussion with other providers, support staff and/or patient's family members, review of medical records, ordering diagnostic tests and analyzing results, and documentation.
Time spent includes direct patient care  (interview and examination of patient), discussion with other providers, support staff and/or patient's family members, review of medical records, ordering diagnostic tests and analyzing results, and documentation.

## 2025-01-05 NOTE — PROGRESS NOTE ADULT - ASSESSMENT
Assessment    79 year old, obese, woman  DAYANA (on CPAP), aortic stenosis, HTN, HLD, former smoker, COPD, Type 2 Diabetes Mellitus, and non-alcoholic fatty liver disease; who presented to [facility] on 11/20 for a scheduled TAVR, and possible stent placement.  She reports several month history of DUFF while walking and climbing up stairs, fatigue and BL LE edema. On 11/20 she underwent TAVR, drainage of retroperitoneal hematoma and pericardial window with Dr. Garland and Noah. Intraoperatively, she had an episode of Vtach (on Amiodarone).      Her hospital course was complicated by the following: acute hypoxic respiratory failure (requiring BiPAP and eventual intubation on 11/25), hemorrhagic shock, AFIB w/ RVR, coffee ground emesis, Code Blue (s/p CPR resulting in rib fractures), AIDA, fever secondary to aspiration PNA, +pseudomonas in sputum, persistent leukocytosis and low grade fevers (on Vanco per ID), L lateral abdominal wall hematoma, distended gallbladder and gallbladder sludge, dysphagia (s/p trache and PEG placement on 12/3), decannulation (12/13), L groin delayed wound healing (requiring wound vac), and hyperglycemia.     Pulmonary eval for PNA .  CT chest noted .        Problem list  - episode of Cough / bronchitis  in pt with COPD / Emphysema   - Mediastinal adenopathy   - Former smoker   - hx of Cardiac arrest   - S/P reversal of Trach removed  - HTN HLD  T2 DM   - Severe AS S/P TAVR     Plan  Clinically doing well  continue inhalers  Advair 250 / 50 Q12h   Spiriva QD   Duoneb q6h   Cough Supressant as needed  Repeat CT chest in 3-4 months as out patient  d/w pt need for out patient pulm f/u   dispo planning as per primary team.

## 2025-01-05 NOTE — PROGRESS NOTE ADULT - PROBLEM SELECTOR PLAN 1
PEG removed yesterday 01/04/25, Dressing dry and intact. area healing well.  Patient is tolerating oral meals.  RN to continue monitoring dressing site PEG removed yesterday 01/04/2025. Dressing dry and intact. area healing well.  Patient is tolerating oral meals.  RN to continue monitoring dressing site.    GI will sign off. Please reconsult as needed.

## 2025-01-05 NOTE — PROGRESS NOTE ADULT - PROBLEM SELECTOR PROBLEM 1
Adjustment, replacement, or removal of percutaneous endoscopic gastrostomy (PEG) tube

## 2025-01-05 NOTE — PROGRESS NOTE ADULT - SUBJECTIVE AND OBJECTIVE BOX
Interval History  Patient seen and examined at bedside. No acute overnight events noted.  Patient reports feeling okay, still with occasional cough productive of clear sputum, no chest pain/SOB. No fever/chills.  PEG removed at bedside yesterday, tolerated well.     ALLERGIES:  penicillins (Hives)  Tetanus Immune Globulin (Unknown)  adhesives (Rash)  Typhoid Vaccines (Anaphylaxis)    MEDICATIONS  (STANDING):  albuterol/ipratropium for Nebulization 3 milliLiter(s) Nebulizer every 6 hours  aMIOdarone    Tablet 200 milliGRAM(s) Oral daily  AQUAPHOR (petrolatum Ointment) 1 Application(s) Topical two times a day  aspirin enteric coated 81 milliGRAM(s) Oral daily  buMETAnide 1 milliGRAM(s) Oral daily  clopidogrel Tablet 75 milliGRAM(s) Oral daily  dextrose 5%. 1000 milliLiter(s) (100 mL/Hr) IV Continuous <Continuous>  dextrose 5%. 1000 milliLiter(s) (50 mL/Hr) IV Continuous <Continuous>  dextrose 50% Injectable 25 Gram(s) IV Push once  dextrose 50% Injectable 12.5 Gram(s) IV Push once  dextrose 50% Injectable 25 Gram(s) IV Push once  escitalopram 20 milliGRAM(s) Oral at bedtime  fluticasone propionate/ salmeterol 250-50 MICROgram(s) Diskus 1 Dose(s) Inhalation two times a day  glucagon  Injectable 1 milliGRAM(s) IntraMuscular once  guaiFENesin  milliGRAM(s) Oral every 12 hours  hydrocodone/homatropine Syrup 5 milliLiter(s) Oral two times a day  insulin lispro (ADMELOG) corrective regimen sliding scale   SubCutaneous three times a day before meals  insulin lispro (ADMELOG) corrective regimen sliding scale   SubCutaneous at bedtime  linagliptin 5 milliGRAM(s) Oral daily  metFORMIN 500 milliGRAM(s) Oral two times a day with meals  metoprolol tartrate 12.5 milliGRAM(s) Oral two times a day  nystatin Powder 1 Application(s) Topical two times a day  pantoprazole    Tablet 40 milliGRAM(s) Oral before breakfast  tiotropium 2.5 MICROgram(s) Inhaler 2 Puff(s) Inhalation daily    MEDICATIONS  (PRN):  acetaminophen     Tablet .. 650 milliGRAM(s) Oral every 6 hours PRN Mild Pain (1 - 3)  benzonatate 100 milliGRAM(s) Oral three times a day PRN Cough  dextrose Oral Gel 15 Gram(s) Oral once PRN Blood Glucose LESS THAN 70 milliGRAM(s)/deciliter  guaiFENesin Oral Liquid (Sugar-Free) 100 milliGRAM(s) Oral every 6 hours PRN Cough  lidocaine 5% Ointment 1 Application(s) Topical three times a day PRN rib pain    Vital Signs Last 24 Hrs  T(F): 98.4 (2025 08:10), Max: 98.4 (2025 08:10)  HR: 71 (2025 08:41) (71 - 84)  BP: 146/85 (2025 08:10) (131/76 - 146/85)  RR: 16 (2025 08:10) (16 - 16)  SpO2: 93% (2025 08:41) (93% - 99%)  I&O's Summary    PHYSICAL EXAM:  GENERAL: NAD  HEENT: NCAT, trach incision healing well  CHEST/LUNG: Good air entry, scattered ex wheeze  HEART: Regular rate and rhythm  ABDOMEN: Soft, Nontender, Nondistended; Bowel sounds present  PEG removal site clean, no discharge/bleeding   MUSCULOSKELETAL/EXTREMITIES:  2+ Peripheral Pulses  Left incision healing well, no discharge/redness  PSYCH: Appropriate affect  NEURO: Alert & Oriented x 3    I personally reviewed the below data/images/labs:    LABS:      POCT Blood Glucose.: 113 mg/dL (2025 11:56)  POCT Blood Glucose.: 103 mg/dL (2025 08:12)  POCT Blood Glucose.: 131 mg/dL (2025 21:06)  POCT Blood Glucose.: 100 mg/dL (2025 17:04)    Urinalysis Basic - ( 2025 17:56 )    Color: Yellow / Appearance: Clear / S.009 / pH: x  Gluc: x / Ketone: Negative mg/dL  / Bili: Negative / Urobili: 0.2 mg/dL   Blood: x / Protein: Trace mg/dL / Nitrite: Negative   Leuk Esterase: Negative / RBC: x / WBC x   Sq Epi: x / Non Sq Epi: x / Bacteria: x    COVID-19 PCR: NotDetec (24 @ 17:29)    Consultant(s) Notes Reviewed:   Care Discused with Consultants/Other Providers:  Imaging Personally Reviewed:

## 2025-01-06 LAB
ALBUMIN SERPL ELPH-MCNC: 2.5 G/DL — LOW (ref 3.3–5)
ALP SERPL-CCNC: 134 U/L — HIGH (ref 40–120)
ALT FLD-CCNC: <6 U/L — LOW (ref 10–45)
ANION GAP SERPL CALC-SCNC: 8 MMOL/L — SIGNIFICANT CHANGE UP (ref 5–17)
AST SERPL-CCNC: 15 U/L — SIGNIFICANT CHANGE UP (ref 10–40)
BASOPHILS # BLD AUTO: 0.02 K/UL — SIGNIFICANT CHANGE UP (ref 0–0.2)
BASOPHILS NFR BLD AUTO: 0.3 % — SIGNIFICANT CHANGE UP (ref 0–2)
BILIRUB SERPL-MCNC: 0.3 MG/DL — SIGNIFICANT CHANGE UP (ref 0.2–1.2)
BUN SERPL-MCNC: 7 MG/DL — SIGNIFICANT CHANGE UP (ref 7–23)
CALCIUM SERPL-MCNC: 8.7 MG/DL — SIGNIFICANT CHANGE UP (ref 8.4–10.5)
CHLORIDE SERPL-SCNC: 99 MMOL/L — SIGNIFICANT CHANGE UP (ref 96–108)
CO2 SERPL-SCNC: 30 MMOL/L — SIGNIFICANT CHANGE UP (ref 22–31)
CREAT SERPL-MCNC: 0.67 MG/DL — SIGNIFICANT CHANGE UP (ref 0.5–1.3)
EGFR: 89 ML/MIN/1.73M2 — SIGNIFICANT CHANGE UP
EOSINOPHIL # BLD AUTO: 0.05 K/UL — SIGNIFICANT CHANGE UP (ref 0–0.5)
EOSINOPHIL NFR BLD AUTO: 0.9 % — SIGNIFICANT CHANGE UP (ref 0–6)
GLUCOSE BLDC GLUCOMTR-MCNC: 105 MG/DL — HIGH (ref 70–99)
GLUCOSE BLDC GLUCOMTR-MCNC: 144 MG/DL — HIGH (ref 70–99)
GLUCOSE BLDC GLUCOMTR-MCNC: 74 MG/DL — SIGNIFICANT CHANGE UP (ref 70–99)
GLUCOSE BLDC GLUCOMTR-MCNC: 91 MG/DL — SIGNIFICANT CHANGE UP (ref 70–99)
GLUCOSE SERPL-MCNC: 98 MG/DL — SIGNIFICANT CHANGE UP (ref 70–99)
HCT VFR BLD CALC: 31.3 % — LOW (ref 34.5–45)
HGB BLD-MCNC: 10 G/DL — LOW (ref 11.5–15.5)
IMM GRANULOCYTES NFR BLD AUTO: 1.4 % — HIGH (ref 0–0.9)
LYMPHOCYTES # BLD AUTO: 0.76 K/UL — LOW (ref 1–3.3)
LYMPHOCYTES # BLD AUTO: 13.1 % — SIGNIFICANT CHANGE UP (ref 13–44)
MCHC RBC-ENTMCNC: 31.4 PG — SIGNIFICANT CHANGE UP (ref 27–34)
MCHC RBC-ENTMCNC: 31.9 G/DL — LOW (ref 32–36)
MCV RBC AUTO: 98.4 FL — SIGNIFICANT CHANGE UP (ref 80–100)
MONOCYTES # BLD AUTO: 0.43 K/UL — SIGNIFICANT CHANGE UP (ref 0–0.9)
MONOCYTES NFR BLD AUTO: 7.4 % — SIGNIFICANT CHANGE UP (ref 2–14)
NEUTROPHILS # BLD AUTO: 4.44 K/UL — SIGNIFICANT CHANGE UP (ref 1.8–7.4)
NEUTROPHILS NFR BLD AUTO: 76.9 % — SIGNIFICANT CHANGE UP (ref 43–77)
NRBC # BLD: 0 /100 WBCS — SIGNIFICANT CHANGE UP (ref 0–0)
PLATELET # BLD AUTO: 260 K/UL — SIGNIFICANT CHANGE UP (ref 150–400)
POTASSIUM SERPL-MCNC: 3.5 MMOL/L — SIGNIFICANT CHANGE UP (ref 3.5–5.3)
POTASSIUM SERPL-SCNC: 3.5 MMOL/L — SIGNIFICANT CHANGE UP (ref 3.5–5.3)
PROT SERPL-MCNC: 7.2 G/DL — SIGNIFICANT CHANGE UP (ref 6–8.3)
RBC # BLD: 3.18 M/UL — LOW (ref 3.8–5.2)
RBC # FLD: 17.6 % — HIGH (ref 10.3–14.5)
SODIUM SERPL-SCNC: 137 MMOL/L — SIGNIFICANT CHANGE UP (ref 135–145)
WBC # BLD: 5.78 K/UL — SIGNIFICANT CHANGE UP (ref 3.8–10.5)
WBC # FLD AUTO: 5.78 K/UL — SIGNIFICANT CHANGE UP (ref 3.8–10.5)

## 2025-01-06 PROCEDURE — 99233 SBSQ HOSP IP/OBS HIGH 50: CPT

## 2025-01-06 RX ORDER — CLOPIDOGREL BISULFATE 75 MG/1
1 TABLET, FILM COATED ORAL
Qty: 30 | Refills: 0
Start: 2025-01-06 | End: 2025-02-04

## 2025-01-06 RX ORDER — TIOTROPIUM BROMIDE MONOHYDRATE 18 UG/1
2 CAPSULE ORAL; RESPIRATORY (INHALATION)
Qty: 2 | Refills: 0
Start: 2025-01-06 | End: 2025-02-04

## 2025-01-06 RX ORDER — PANTOPRAZOLE 40 MG/1
1 TABLET, DELAYED RELEASE ORAL
Qty: 30 | Refills: 0
Start: 2025-01-06 | End: 2025-02-04

## 2025-01-06 RX ORDER — GUAIFENESIN 100 MG/5ML
5 SYRUP ORAL
Qty: 600 | Refills: 0
Start: 2025-01-06 | End: 2025-02-04

## 2025-01-06 RX ORDER — GUAIFENESIN 100 MG/5ML
1 SYRUP ORAL
Qty: 60 | Refills: 0
Start: 2025-01-06 | End: 2025-02-04

## 2025-01-06 RX ORDER — AMIODARONE HYDROCHLORIDE 200 MG/1
1 TABLET ORAL
Qty: 30 | Refills: 0
Start: 2025-01-06 | End: 2025-02-04

## 2025-01-06 RX ORDER — METOPROLOL TARTRATE 50 MG
0.5 TABLET ORAL
Qty: 15 | Refills: 0
Start: 2025-01-06 | End: 2025-02-04

## 2025-01-06 RX ORDER — NYSTATIN TOPICAL POWDER 100000 U/G
1 POWDER TOPICAL
Qty: 2 | Refills: 0
Start: 2025-01-06 | End: 2025-02-04

## 2025-01-06 RX ORDER — ALBUTEROL SULFATE 90 UG/1
2 INHALANT RESPIRATORY (INHALATION)
Qty: 2 | Refills: 0
Start: 2025-01-06 | End: 2025-02-04

## 2025-01-06 RX ORDER — LINAGLIPTIN 5 MG/1
1 TABLET, FILM COATED ORAL
Qty: 30 | Refills: 0
Start: 2025-01-06 | End: 2025-02-04

## 2025-01-06 RX ORDER — HYDROCODONE/HOMATROPINE
5 SYRUP ORAL
Qty: 70 | Refills: 0
Start: 2025-01-06 | End: 2025-01-12

## 2025-01-06 RX ORDER — ESCITALOPRAM OXALATE 10 MG/1
1 TABLET ORAL
Qty: 30 | Refills: 0
Start: 2025-01-06 | End: 2025-02-04

## 2025-01-06 RX ORDER — BENZONATATE 100 MG
1 CAPSULE ORAL
Qty: 90 | Refills: 0
Start: 2025-01-06 | End: 2025-02-04

## 2025-01-06 RX ORDER — METFORMIN 850 MG/1
1 TABLET ORAL
Qty: 60 | Refills: 0
Start: 2025-01-06 | End: 2025-02-04

## 2025-01-06 RX ORDER — BUMETANIDE 2 MG/1
1 TABLET ORAL
Qty: 30 | Refills: 0
Start: 2025-01-06 | End: 2025-02-04

## 2025-01-06 RX ORDER — FLUTICASONE PROPIONATE AND SALMETEROL 50; 500 UG/1; UG/1
2 POWDER ORAL; RESPIRATORY (INHALATION)
Qty: 2 | Refills: 0
Start: 2025-01-06 | End: 2025-02-04

## 2025-01-06 RX ADMIN — METFORMIN 500 MILLIGRAM(S): 850 TABLET ORAL at 17:09

## 2025-01-06 RX ADMIN — FLUTICASONE PROPIONATE AND SALMETEROL 1 DOSE(S): 50; 500 POWDER ORAL; RESPIRATORY (INHALATION) at 22:17

## 2025-01-06 RX ADMIN — AMIODARONE HYDROCHLORIDE 200 MILLIGRAM(S): 200 TABLET ORAL at 05:38

## 2025-01-06 RX ADMIN — ESCITALOPRAM OXALATE 20 MILLIGRAM(S): 10 TABLET ORAL at 21:05

## 2025-01-06 RX ADMIN — LINAGLIPTIN 5 MILLIGRAM(S): 5 TABLET, FILM COATED ORAL at 11:31

## 2025-01-06 RX ADMIN — Medication 600 MILLIGRAM(S): at 05:38

## 2025-01-06 RX ADMIN — METFORMIN 500 MILLIGRAM(S): 850 TABLET ORAL at 07:33

## 2025-01-06 RX ADMIN — Medication 600 MILLIGRAM(S): at 17:09

## 2025-01-06 RX ADMIN — Medication 1 APPLICATION(S): at 17:10

## 2025-01-06 RX ADMIN — Medication 12.5 MILLIGRAM(S): at 05:38

## 2025-01-06 RX ADMIN — Medication 81 MILLIGRAM(S): at 11:31

## 2025-01-06 RX ADMIN — Medication 12.5 MILLIGRAM(S): at 17:09

## 2025-01-06 RX ADMIN — IPRATROPIUM BROMIDE AND ALBUTEROL SULFATE 3 MILLILITER(S): .5; 2.5 SOLUTION RESPIRATORY (INHALATION) at 22:18

## 2025-01-06 RX ADMIN — IPRATROPIUM BROMIDE AND ALBUTEROL SULFATE 3 MILLILITER(S): .5; 2.5 SOLUTION RESPIRATORY (INHALATION) at 16:07

## 2025-01-06 RX ADMIN — NYSTATIN TOPICAL POWDER 1 APPLICATION(S): 100000 POWDER TOPICAL at 17:08

## 2025-01-06 RX ADMIN — NYSTATIN TOPICAL POWDER 1 APPLICATION(S): 100000 POWDER TOPICAL at 05:35

## 2025-01-06 RX ADMIN — PANTOPRAZOLE 40 MILLIGRAM(S): 40 TABLET, DELAYED RELEASE ORAL at 05:38

## 2025-01-06 RX ADMIN — BUMETANIDE 1 MILLIGRAM(S): 2 TABLET ORAL at 05:38

## 2025-01-06 RX ADMIN — CLOPIDOGREL BISULFATE 75 MILLIGRAM(S): 75 TABLET, FILM COATED ORAL at 11:31

## 2025-01-06 NOTE — PROGRESS NOTE ADULT - SUBJECTIVE AND OBJECTIVE BOX
CC: S/p TAVR     Today's Subjective & Objective Findings:  Patient seen and examined at bedside this AM.  Admits to sleeping well.   Cough is non-progressive.   S/p PEG removal on 1/4.   Planning for DC home on 1/7.     Denies headache, dizziness, visual changes, chest pain, SOB/DUFF, abdominal pain, nausea, vomiting, diarrhea, dysuria, numbness or tingling. LBM 1/5  Left groin wound on wound care, surface area is very minimal, no discharge    Therapy  Ambulated 100ft, 50ft with RW standing rest breaks as needed; verbal cues for  diaphragmatic breathing to improve stamina.  Forward flexed posture with  ambulation, increased time.  Noted SOB toward end of walking.  Therapeutic Exercise  Sit to stands (hands of knees) 3x 5 reps  *Pt required constant motivation/encouragement to participate through strenuous  activity  Pain Reassessment  Patient currently without complaints of pain.  Cognitive deficits--deficits with higher executive function and some impulsive/risk taking behavior  No aggression    Vital Signs Last 24 Hrs  T(C): 36.7 (06 Jan 2025 07:27), Max: 36.7 (05 Jan 2025 21:07)  T(F): 98.1 (06 Jan 2025 07:27), Max: 98.1 (05 Jan 2025 21:07)  HR: 69 (06 Jan 2025 08:40) (62 - 90)  BP: 143/83 (06 Jan 2025 08:40) (131/68 - 162/74)  BP(mean): --  RR: 16 (06 Jan 2025 08:40) (16 - 16)  SpO2: 95% (06 Jan 2025 08:40) (92% - 95%)    PHYSICAL EXAM  Constitutional -Comfortable  Neck - Supple  Cardiovascular - Palpable peripheral pulses   Respiratory/Chest- decreased air entry b/l Rt lower lobes and few rhonchi. normal air entry left lobes   Abdomen - Soft, Non-tender, mild erythema around PEG   Extremities - No cyanosis, No edema,     Neurologic Exam - Alert, Oriented  Sensory - Light touch sensation intact  Motor Function - Moves all extremities spontaneously  Skin -    L groin wound- wet to dry packing with gauze,     LABS:                        10.0   5.78  )-----------( 260      ( 06 Jan 2025 05:47 )             31.3     01-06    137  |  99  |  7   ----------------------------<  98  3.5   |  30  |  0.67    Ca    8.7      06 Jan 2025 05:47    TPro  7.2  /  Alb  2.5[L]  /  TBili  0.3  /  DBili  x   /  AST  15  /  ALT  <6[L]  /  AlkPhos  134[H]  01-06      Urinalysis Basic - ( 06 Jan 2025 05:47 )    Color: x / Appearance: x / SG: x / pH: x  Gluc: 98 mg/dL / Ketone: x  / Bili: x / Urobili: x   Blood: x / Protein: x / Nitrite: x   Leuk Esterase: x / RBC: x / WBC x   Sq Epi: x / Non Sq Epi: x / Bacteria: x          < from: CT Chest No Cont (01.02.25 @ 11:40) >  CT CHEST   ORDERED BY:  SOURAV SHEETS     PROCEDURE DATE:  01/02/2025          INTERPRETATION:  INDICATION: Persistent RLL atelectasis  TECHNIQUE: Unenhanced CT of the chest. Coronal, sagittal, and MIP images   were reconstructed and reviewed.  COMPARISON: No prior chest CT. CT abdomen and pelvis 6/11/2024    FINDINGS:    AIRWAYS, LUNGS, PLEURA: Patent central airways. Emphysema. There are   bilateral nodular opacities and bandlike consolidations within the lung   bases possibly infectious in etiology. No pleural effusion.    LYMPH NODES, MEDIASTINUM: No lymphadenopathy. Indeterminant 0.9 cm   anterior mediastinal nodule.    HEART, VESSELS: Heart size is normal. No pericardial effusion. Thoracic   aorta normal in diameter. Mitral annulus and coronary calcifications.   TAVR.    UPPER ABDOMEN: Indeterminant isodense material within gallbladder.   Gastrostomy.    CHEST WALL, BONES: No aggressive osseous lesion. Old fracture deformity   of multiple bilateral ribs and sternum.    LOWER NECK: Within normal limits.    IMPRESSION:    Bilateral nodular opacities and bandlike consolidations within the lung   bases possibly infectious in etiology. Recommend follow-up chest CT in   4-8 weeks to determine resolution.    Emphysema.    Indeterminant 0.9 cm anterior mediastinal nodule. Recommend evaluation   with CT chest without and with contrast anterior mediastinal lesion   protocol.    Indeterminant isodense material within gallbladder. Further evaluation   can be performed with sonogram.    < end of copied text >    < from: Xray Chest 1 View- PORTABLE-Urgent (Xray Chest 1 View- PORTABLE-Urgent .) (12.23.24 @ 11:01) >   XR CHEST PORTABLE URGENT 1V   ORDERED BY:  SOURAV SHEETS     PROCEDURE DATE:  12/23/2024      INTERPRETATION:  AP chest on December 23, 2024 at 10:51 AM. Patient has   wheezing.    Heart magnified by technique. TAVR aortic valve noted.    There is slight blunting right base laterally new since December 5, 2023.    IMPRESSION: Slight blunting of right base laterally new since prior.      CAPILLARY BLOOD GLUCOSE  POCT Blood Glucose.: 105 mg/dL (06 Jan 2025 07:25)  POCT Blood Glucose.: 103 mg/dL (05 Jan 2025 21:04)  POCT Blood Glucose.: 110 mg/dL (05 Jan 2025 16:56)  POCT Blood Glucose.: 113 mg/dL (05 Jan 2025 11:56)      MEDICATIONS  (STANDING):  albuterol/ipratropium for Nebulization 3 milliLiter(s) Nebulizer every 6 hours  aMIOdarone    Tablet 200 milliGRAM(s) Oral daily  AQUAPHOR (petrolatum Ointment) 1 Application(s) Topical two times a day  aspirin enteric coated 81 milliGRAM(s) Oral daily  buMETAnide 1 milliGRAM(s) Oral daily  clopidogrel Tablet 75 milliGRAM(s) Oral daily  dextrose 5%. 1000 milliLiter(s) (100 mL/Hr) IV Continuous <Continuous>  dextrose 5%. 1000 milliLiter(s) (50 mL/Hr) IV Continuous <Continuous>  dextrose 50% Injectable 25 Gram(s) IV Push once  dextrose 50% Injectable 12.5 Gram(s) IV Push once  dextrose 50% Injectable 25 Gram(s) IV Push once  escitalopram 20 milliGRAM(s) Oral at bedtime  fluticasone propionate/ salmeterol 250-50 MICROgram(s) Diskus 1 Dose(s) Inhalation two times a day  glucagon  Injectable 1 milliGRAM(s) IntraMuscular once  guaiFENesin  milliGRAM(s) Oral every 12 hours  hydrocodone/homatropine Syrup 5 milliLiter(s) Oral two times a day  insulin lispro (ADMELOG) corrective regimen sliding scale   SubCutaneous three times a day before meals  insulin lispro (ADMELOG) corrective regimen sliding scale   SubCutaneous at bedtime  linagliptin 5 milliGRAM(s) Oral daily  metFORMIN 500 milliGRAM(s) Oral two times a day with meals  metoprolol tartrate 12.5 milliGRAM(s) Oral two times a day  nystatin Powder 1 Application(s) Topical two times a day  pantoprazole    Tablet 40 milliGRAM(s) Oral before breakfast  tiotropium 2.5 MICROgram(s) Inhaler 2 Puff(s) Inhalation daily    MEDICATIONS  (PRN):  acetaminophen     Tablet .. 650 milliGRAM(s) Oral every 6 hours PRN Mild Pain (1 - 3)  benzonatate 100 milliGRAM(s) Oral three times a day PRN Cough  dextrose Oral Gel 15 Gram(s) Oral once PRN Blood Glucose LESS THAN 70 milliGRAM(s)/deciliter  guaiFENesin Oral Liquid (Sugar-Free) 100 milliGRAM(s) Oral every 6 hours PRN Cough  lidocaine 5% Ointment 1 Application(s) Topical three times a day PRN rib pain   CC: S/p TAVR     Today's Subjective & Objective Findings:  Patient seen and examined at bedside this AM.  Admits to sleeping well.   Cough significantly reduced  S/p PEG removal on 1/4. no pain symptoms, patient very happy with this  Planning for DC home on 1/7.   Reports that plan for private home care is in place as planned, to be supplemented with hours from home care   Discussed GI and pulmonary review recs    Denies headache, dizziness, visual changes, chest pain, SOB/DUFF, abdominal pain, nausea, vomiting, diarrhea, dysuria, numbness or tingling. LBM 1/5  Left groin wound on wound care, surface area is very minimal, no discharge    Therapy  Ambulated 130ft x 3 with Rollator seated rest breaks as needed; verbal cues for  diaphragmatic breathing to improve stamina.  Forward flexed posture with  ambulation, increased time.  Noted SOB toward end of walking.  Therapeutic Exercise  NuStep 4 mins with 2 min rest break  *Pt required constant motivation/encouragement to participate through strenuous  activity    Vital Signs Last 24 Hrs  T(C): 36.7 (06 Jan 2025 07:27), Max: 36.7 (05 Jan 2025 21:07)  T(F): 98.1 (06 Jan 2025 07:27), Max: 98.1 (05 Jan 2025 21:07)  HR: 69 (06 Jan 2025 08:40) (62 - 90)  BP: 143/83 (06 Jan 2025 08:40) (131/68 - 162/74)  RR: 16 (06 Jan 2025 08:40) (16 - 16)  SpO2: 95% (06 Jan 2025 08:40) (92% - 95%)    PHYSICAL EXAM  Constitutional -Comfortable  Neck - Supple  Cardiovascular - Palpable peripheral pulses   Respiratory/Chest- decreased air entry b/l Rt lower lobes and few rhonchi. normal air entry left lobes   Abdomen - Soft, Non-tender, opening at PEG removal site, no drainage, dry dressing in situ  Extremities - No cyanosis, No edema,     Neurologic Exam - Alert, Oriented  Sensory - Light touch sensation intact  Motor Function - Moves all extremities spontaneously  Skin -    L groin wound- dry dressing,    LABS:                        10.0   5.78  )-----------( 260      ( 06 Jan 2025 05:47 )             31.3     01-06    137  |  99  |  7   ----------------------------<  98  3.5   |  30  |  0.67    Ca    8.7      06 Jan 2025 05:47    TPro  7.2  /  Alb  2.5[L]  /  TBili  0.3  /  DBili  x   /  AST  15  /  ALT  <6[L]  /  AlkPhos  134[H]  01-06      Urinalysis Basic - ( 06 Jan 2025 05:47 )    Color: x / Appearance: x / SG: x / pH: x  Gluc: 98 mg/dL / Ketone: x  / Bili: x / Urobili: x   Blood: x / Protein: x / Nitrite: x   Leuk Esterase: x / RBC: x / WBC x   Sq Epi: x / Non Sq Epi: x / Bacteria: x          < from: CT Chest No Cont (01.02.25 @ 11:40) >  CT CHEST   ORDERED BY:  SOURAV SHEETS     PROCEDURE DATE:  01/02/2025          INTERPRETATION:  INDICATION: Persistent RLL atelectasis  TECHNIQUE: Unenhanced CT of the chest. Coronal, sagittal, and MIP images   were reconstructed and reviewed.  COMPARISON: No prior chest CT. CT abdomen and pelvis 6/11/2024    FINDINGS:    AIRWAYS, LUNGS, PLEURA: Patent central airways. Emphysema. There are   bilateral nodular opacities and bandlike consolidations within the lung   bases possibly infectious in etiology. No pleural effusion.    LYMPH NODES, MEDIASTINUM: No lymphadenopathy. Indeterminant 0.9 cm   anterior mediastinal nodule.    HEART, VESSELS: Heart size is normal. No pericardial effusion. Thoracic   aorta normal in diameter. Mitral annulus and coronary calcifications.   TAVR.    UPPER ABDOMEN: Indeterminant isodense material within gallbladder.   Gastrostomy.    CHEST WALL, BONES: No aggressive osseous lesion. Old fracture deformity   of multiple bilateral ribs and sternum.    LOWER NECK: Within normal limits.    IMPRESSION:    Bilateral nodular opacities and bandlike consolidations within the lung   bases possibly infectious in etiology. Recommend follow-up chest CT in   4-8 weeks to determine resolution.    Emphysema.    Indeterminant 0.9 cm anterior mediastinal nodule. Recommend evaluation   with CT chest without and with contrast anterior mediastinal lesion   protocol.    Indeterminant isodense material within gallbladder. Further evaluation   can be performed with sonogram.    < end of copied text >    < from: Xray Chest 1 View- PORTABLE-Urgent (Xray Chest 1 View- PORTABLE-Urgent .) (12.23.24 @ 11:01) >   XR CHEST PORTABLE URGENT 1V   ORDERED BY:  SOURAV SHEETS     PROCEDURE DATE:  12/23/2024      INTERPRETATION:  AP chest on December 23, 2024 at 10:51 AM. Patient has   wheezing.    Heart magnified by technique. TAVR aortic valve noted.    There is slight blunting right base laterally new since December 5, 2023.    IMPRESSION: Slight blunting of right base laterally new since prior.      CAPILLARY BLOOD GLUCOSE  POCT Blood Glucose.: 105 mg/dL (06 Jan 2025 07:25)  POCT Blood Glucose.: 103 mg/dL (05 Jan 2025 21:04)  POCT Blood Glucose.: 110 mg/dL (05 Jan 2025 16:56)  POCT Blood Glucose.: 113 mg/dL (05 Jan 2025 11:56)      MEDICATIONS  (STANDING):  albuterol/ipratropium for Nebulization 3 milliLiter(s) Nebulizer every 6 hours  aMIOdarone    Tablet 200 milliGRAM(s) Oral daily  AQUAPHOR (petrolatum Ointment) 1 Application(s) Topical two times a day  aspirin enteric coated 81 milliGRAM(s) Oral daily  buMETAnide 1 milliGRAM(s) Oral daily  clopidogrel Tablet 75 milliGRAM(s) Oral daily  dextrose 5%. 1000 milliLiter(s) (100 mL/Hr) IV Continuous <Continuous>  dextrose 5%. 1000 milliLiter(s) (50 mL/Hr) IV Continuous <Continuous>  dextrose 50% Injectable 25 Gram(s) IV Push once  dextrose 50% Injectable 12.5 Gram(s) IV Push once  dextrose 50% Injectable 25 Gram(s) IV Push once  escitalopram 20 milliGRAM(s) Oral at bedtime  fluticasone propionate/ salmeterol 250-50 MICROgram(s) Diskus 1 Dose(s) Inhalation two times a day  glucagon  Injectable 1 milliGRAM(s) IntraMuscular once  guaiFENesin  milliGRAM(s) Oral every 12 hours  hydrocodone/homatropine Syrup 5 milliLiter(s) Oral two times a day  insulin lispro (ADMELOG) corrective regimen sliding scale   SubCutaneous three times a day before meals  insulin lispro (ADMELOG) corrective regimen sliding scale   SubCutaneous at bedtime  linagliptin 5 milliGRAM(s) Oral daily  metFORMIN 500 milliGRAM(s) Oral two times a day with meals  metoprolol tartrate 12.5 milliGRAM(s) Oral two times a day  nystatin Powder 1 Application(s) Topical two times a day  pantoprazole    Tablet 40 milliGRAM(s) Oral before breakfast  tiotropium 2.5 MICROgram(s) Inhaler 2 Puff(s) Inhalation daily    MEDICATIONS  (PRN):  acetaminophen     Tablet .. 650 milliGRAM(s) Oral every 6 hours PRN Mild Pain (1 - 3)  benzonatate 100 milliGRAM(s) Oral three times a day PRN Cough  dextrose Oral Gel 15 Gram(s) Oral once PRN Blood Glucose LESS THAN 70 milliGRAM(s)/deciliter  guaiFENesin Oral Liquid (Sugar-Free) 100 milliGRAM(s) Oral every 6 hours PRN Cough  lidocaine 5% Ointment 1 Application(s) Topical three times a day PRN rib pain

## 2025-01-06 NOTE — PROGRESS NOTE ADULT - NS ATTEND AMEND GEN_ALL_CORE FT
I have made amendments to the documentation where necessary. Additional comments: I have personally seen and examined the patient independently Medical records reviewed. I have made amendments to the documentation where necessary and adjusted the history, physical examination, and plan as documented by the NP.     Clinically stable   Reports that cough is minimal  Breathing improved, air entry RLL much improved    Ambulating increasing distance, balance improved    Labs unremarkable   Peg site removal, no drainage    Motor function 5/5    Continue therapy   Discussed falls precaution ,need for adequate lighting at home  Consult recs d/w patient   F/u pulmonary and repeat CT chest to monitor pulmonary nodules and mediastinal mass  No need for night sleep device     Spent 53 mins, patient review, discussion of consult recs, update to family and d/c planning I have made amendments to the documentation where necessary. Additional comments: I have personally seen and examined the patient independently Medical records reviewed. I have made amendments to the documentation where necessary and adjusted the history, physical examination, and plan as documented by the NP.     Clinically stable   Reports that cough is minimal  Breathing improved, air entry RLL much improved    Ambulating increasing distance, balance improved    Labs unremarkable   Peg site removal, no drainage    Motor function 5/5    Continue therapy   Discussed falls precaution ,need for adequate lighting at home  Consult recs d/w patient   F/u pulmonary and repeat CT chest to monitor pulmonary nodules and mediastinal mass  No need for night sleep device   Discussed dc plan with patient's daughters    Spent 53 mins, patient review, discussion of consult recs, update to family and d/c planning

## 2025-01-06 NOTE — PROGRESS NOTE ADULT - ASSESSMENT
79 year old, obese, female with PMH of DAYANA (on CPAP), aortic stenosis, HTN, HLD, current smoker, COPD, Type 2 Diabetes Mellitus, and non-alcoholic fatty liver disease; who presented to [facility] on 11/20 for a scheduled TAVR, and possible stent placement.  She reports several month history of DUFF while walking and climbing up stairs, fatigue and BL LE edema. On 11/20 she underwent TAVR, drainage of retroperitoneal hematoma and pericardial window with Dr. Garland and Noah. Intraoperatively, she had an episode of Vtach (on Amiodarone).    Her hospital course was complicated by the following: acute hypoxic respiratory failure (requiring BiPAP and eventual intubation on 11/25), hemorrhagic shock, AFIB w/ RVR, coffee ground emesis, Code Blue (s/p CPR resulting in rib fractures), AIDA, fever secondary to aspiration PNA, +pseudomonas in sputum, persistent leukocytosis and low grade fevers (on Vanco per ID), L lateral abdominal wall hematoma, distended gallbladder and gallbladder sludge, dysphagia (s/p trache and PEG placement on 12/3), decannulation (12/13), L groin delayed wound healing (requiring wound vac), and hyperglycemia.  Patient now admitted for a multidisciplinary rehab program. 12-19-24 @ 13:25    * Cognitive deficits - SLP following   * S/p PEG removal on 1/4   * Wound care teaching for family   * UT home for 1/7 with private hire aide    #Aortic valve disease S/p TAVR  - Gait Instability, ADL impairments and Functional impairments:   Continue Comprehensive Rehab Program of PT/OT/SLP  - 3 hours a day, 5 days a week  - ASA 81mg daily  - Plavix 75mg daily     REHAB ISSUES  Decreased ambulatory distance  Left groin wound  Impaired balance  Resp failure     #Acute Hypoxic Respiratory Failures   #Cough  (On nightly CPAP prior to acute care admission, but no longer requiring this all through admission)   - CXR with R lung base atelectasis  - Mucinex BID  - Chest PT   - Incentive Spirometer   - CT chest (1/2) emphysema, BL nodules, consolidations, and .9cm anterior mediastinal mass - pulm recs appreciated - repeat CT Chest in 3-4 months   - Advair  - Spiriva  - Duoneb every 6 hours  - Hycodan 5mL BID     #HTN  #AFIB w/ RVR  - Amiodarone 200mg daily   - Bumex 1mg daily   - Metoprolol 12.5mg BID     #Type 2 Diabetes Mellitus  - A1c: 6.4% (Dec 2024)    - FS AC & HS  - Mod ISS  - Lantus 12U HS    #Mood  - Escitalopram 20mg daily     #Skin - Trach site with healthy scab, open to air,  L groin wound- wet to dry packing with gauze, CDI, L flank bruising, generalized ecchymosis and scabbing, b/l dry feet  - Aquaphor BID   - Pressure injury/Skin: OOB to Chair, PT/OT   - Left groin wound - wound care recs appreciated   --Moisten dressing before removal, then daily Normal Saline Cleanse of Left groin wound, pat thoroughly dry.  Apply Cavillon film barrier daily to intact periwound skin, allow to dry.  Gently pack area daily with saline moistened non-woven gauze (half of one piece), cover with folded dry non-woven gauze, cover with Tegaderm.    #Pain Mgmt   - PRN: Tylenol     #GI/Bowel Mgmt   - Pantoprazole  - Senna , Miralax    #/Bladder Mgmt --voiding     #FEN   - Diet - Regular with Thin Liquids   - Dysphagia  SLP - evaluation and treatment  - S/p MBS on 12/23 - passed, diet upgraded (as above)  - Dysphagia--tolerating oral feeds, PEG placed 12/3 - GI recs outpatient PEG removal after 1/7, if still admitted after 12/31 may reassess PEG removal while in hospital   - S/p PEG removal on 1/4     #Precautions / PROPHYLAXIS:   - Falls, Cardiac  - ortho: Weight bearing status: WBAT   - Lungs: Aspiration, Incentive Spirometer   - DVT: ASA 81mg daily & Plavix 75mg daily, TEDs   ----------------------------------------------  Next of Kin:   Daughter: Janie Drake: [phone]    HEALTH MANAGEMENT   1/3--Patient reports that family has made arrangements for a live in care giver post discharge  They are also interested in the home care as referred    ----------------------------------------------  IDT 12/31  Therapy-- Ambulating 150ft Rollator, supervision, 8 stairs 1 hand rail supervision  SLP--Mod cog deficits, impulsivity,   Barriers--left groin wound, cognitive deficits  Est 1/4 vs RACHEL dependent on family impression post family training on 1/2  Daughter currently abroad and coming for family training 1/2  -----------------------------------------------  Dr. SMITH's Liaison with Family/Providers:    1/2--Meeting at bedside with patient, daughter and another daughter engaged via phone. We discussed her current functional progress as noted in last IDT  Persisting cog deficits with impulsivity.  Options for d/c disposition, GI f/u for possible PEG removal  Imaging to clarify on persisting Rt lung atelectasis  Family training today, Family and patient to decide between home dc with home care services, d/c to one of her children's home temporarily or short term RACHEL placement   -----------------------------------------------    OUTPATIENT/FOLLOW UP:    Todd Devine MD  Vascular surgery, General surgery  [address] [facility] [address] [facility]  [address]  [place] [address]  [phone]    Todd Odom MD  Cardiology, Interventional  [facility]    [address]  [address]  [facility], [address] [phone]   79 year old, obese, female with PMH of DAYANA (on CPAP), aortic stenosis, HTN, HLD, current smoker, COPD, Type 2 Diabetes Mellitus, and non-alcoholic fatty liver disease; who presented to [facility] on 11/20 for a scheduled TAVR, and possible stent placement.  She reports several month history of DUFF while walking and climbing up stairs, fatigue and BL LE edema. On 11/20 she underwent TAVR, drainage of retroperitoneal hematoma and pericardial window with Dr. Garland and Noah. Intraoperatively, she had an episode of Vtach (on Amiodarone).    Her hospital course was complicated by the following: acute hypoxic respiratory failure (requiring BiPAP and eventual intubation on 11/25), hemorrhagic shock, AFIB w/ RVR, coffee ground emesis, Code Blue (s/p CPR resulting in rib fractures), AIDA, fever secondary to aspiration PNA, +pseudomonas in sputum, persistent leukocytosis and low grade fevers (on Vanco per ID), L lateral abdominal wall hematoma, distended gallbladder and gallbladder sludge, dysphagia (s/p trache and PEG placement on 12/3), decannulation (12/13), L groin delayed wound healing (requiring wound vac), and hyperglycemia.  Patient now admitted for a multidisciplinary rehab program. 12-19-24 @ 13:25    * Cognitive deficits - SLP following   * S/p PEG removal on 1/4, tolerated  * Wound care teaching for family   * Labs unremarkable  * Pulmonary and GI review recs appreciated  * DC home for 1/7 with private hire aide and home care from insurance    #Aortic valve disease S/p TAVR  - Gait Instability, ADL impairments and Functional impairments:   Continue Comprehensive Rehab Program of PT/OT/SLP  - 3 hours a day, 5 days a week  - ASA 81mg daily  - Plavix 75mg daily     REHAB ISSUES  Decreased ambulatory distance  Left groin wound  Impaired balance  Resp failure     #Acute Hypoxic Respiratory Failures   #Cough  (On nightly CPAP prior to acute care admission, but no longer requiring this all through admission)   - CXR with R lung base atelectasis  - Mucinex BID  - Chest PT   - Incentive Spirometer   - CT chest (1/2) emphysema, BL nodules, consolidations, and 0.9cm anterior mediastinal mass - pulm recs appreciated - repeat CT Chest in 3-4 months   - Advair  - Spiriva  - Duoneb every 6 hours  - Hycodan 5mL BID       #HTN  #AFIB w/ RVR  - Amiodarone 200mg daily   - Bumex 1mg daily   - Metoprolol 12.5mg BID     #Type 2 Diabetes Mellitus  - A1c: 6.4% (Dec 2024)    - FS AC & HS  - Mod ISS  - Lantus 12U HS    #Mood  - Escitalopram 20mg daily     #Skin - Trach site healed  - Aquaphor BID   - Pressure injury/Skin: OOB to Chair, PT/OT   - Left groin wound - dry dressing      #Pain Mgmt   - PRN: Tylenol     #GI/Bowel Mgmt   - Pantoprazole  - Senna , Miralax    #/Bladder Mgmt --voiding     #FEN   - Diet - Regular with Thin Liquids   - Dysphagia  SLP - evaluation and treatment  - S/p MBS on 12/23 - passed, diet upgraded (as above)  - Dysphagia-resolved  - S/p PEG removal on 1/4 by GI team     #Precautions / PROPHYLAXIS:   - Falls, Cardiac  - ortho: Weight bearing status: WBAT   - Lungs: Aspiration, Incentive Spirometer   - DVT: ASA 81mg daily & Plavix 75mg daily, TEDs   ----------------------------------------------  Next of Kin:   Daughter: Janie Drake: [phone]    HEALTH MANAGEMENT   1/3--Patient reports that family has made arrangements for a live in care giver post discharge  They are also interested in the home care as referred    ----------------------------------------------  IDT 12/31  Therapy-- Ambulating 150ft Rollator, supervision, 8 stairs 1 hand rail supervision  SLP--Mod cog deficits, impulsivity,   Barriers--left groin wound, cognitive deficits  Est 1/4 vs RACHEL dependent on family impression post family training on 1/2  Daughter currently abroad and coming for family training 1/2  -----------------------------------------------  Dr. O's Liaison with Family/Providers:    1/2--Meeting at bedside with patient, daughter and another daughter engaged via phone. We discussed her current functional progress as noted in last IDT  Persisting cog deficits with impulsivity.  Options for d/c disposition, GI f/u for possible PEG removal  Imaging to clarify on persisting Rt lung atelectasis  Family training today, Family and patient to decide between home dc with home care services, d/c to one of her children's home temporarily or short term RACHEL placement   -----------------------------------------------    OUTPATIENT/FOLLOW UP:    Todd Devine MD  Vascular surgery, General surgery  [address] [facility]  [address]  [place] [address]  [phone]    Todd Odom MD  Cardiology, Interventional  [facility]   [address] [facility]  [address]  [facility], [address] [phone]   79 year old, obese, female with PMH of DAYANA (on CPAP), aortic stenosis, HTN, HLD, current smoker, COPD, Type 2 Diabetes Mellitus, and non-alcoholic fatty liver disease; who presented to [facility] on 11/20 for a scheduled TAVR, and possible stent placement.  She reports several month history of DUFF while walking and climbing up stairs, fatigue and BL LE edema. On 11/20 she underwent TAVR, drainage of retroperitoneal hematoma and pericardial window with Dr. Garland and Noah. Intraoperatively, she had an episode of Vtach (on Amiodarone).    Her hospital course was complicated by the following: acute hypoxic respiratory failure (requiring BiPAP and eventual intubation on 11/25), hemorrhagic shock, AFIB w/ RVR, coffee ground emesis, Code Blue (s/p CPR resulting in rib fractures), AIDA, fever secondary to aspiration PNA, +pseudomonas in sputum, persistent leukocytosis and low grade fevers (on Vanco per ID), L lateral abdominal wall hematoma, distended gallbladder and gallbladder sludge, dysphagia (s/p trache and PEG placement on 12/3), decannulation (12/13), L groin delayed wound healing (requiring wound vac), and hyperglycemia.  Patient now admitted for a multidisciplinary rehab program. 12-19-24 @ 13:25    * Cognitive deficits - SLP following   * S/p PEG removal on 1/4, tolerated  * Wound care teaching for family   * Labs unremarkable  * Pulmonary and GI review recs appreciated  * DC home for 1/7 with private hire aide and home care from insurance    #Aortic valve disease S/p TAVR  - Gait Instability, ADL impairments and Functional impairments:   Continue Comprehensive Rehab Program of PT/OT/SLP  - 3 hours a day, 5 days a week  - ASA 81mg daily  - Plavix 75mg daily     REHAB ISSUES  Decreased ambulatory distance  Left groin wound  Impaired balance  Resp failure     #Acute Hypoxic Respiratory Failures   #Cough  (On nightly CPAP prior to acute care admission, but no longer requiring this all through admission)   - CXR with R lung base atelectasis  - Mucinex BID  - Chest PT   - Incentive Spirometer   - CT chest (1/2) emphysema, BL nodules, consolidations, and 0.9cm anterior mediastinal mass - pulm recs appreciated - repeat CT Chest in 3-4 months   - Advair  - Spiriva  - Duoneb every 6 hours  - Hycodan 5mL BID       #HTN  #AFIB w/ RVR  - Amiodarone 200mg daily   - Bumex 1mg daily   - Metoprolol 12.5mg BID     #Type 2 Diabetes Mellitus  - A1c: 6.4% (Dec 2024)    - FS AC & HS  - Mod ISS  - Lantus 12U HS    #Mood  - Escitalopram 20mg daily     #Skin - Trach site healed  - Aquaphor BID   - Pressure injury/Skin: OOB to Chair, PT/OT   - Left groin wound - dry dressing      #Pain Mgmt   - PRN: Tylenol     #GI/Bowel Mgmt   - Pantoprazole  - Senna , Miralax    #/Bladder Mgmt --voiding     #FEN   - Diet - Regular with Thin Liquids   - Dysphagia  SLP - evaluation and treatment  - S/p MBS on 12/23 - passed, diet upgraded (as above)  - Dysphagia-resolved  - S/p PEG removal on 1/4 by GI team     #Precautions / PROPHYLAXIS:   - Falls, Cardiac  - ortho: Weight bearing status: WBAT   - Lungs: Aspiration, Incentive Spirometer   - DVT: ASA 81mg daily & Plavix 75mg daily, TEDs   ----------------------------------------------  Next of Kin:   Daughter: Janie Draek: [phone]    HEALTH MANAGEMENT   1/3--Patient reports that family has made arrangements for a live in care giver post discharge  They are also interested in the home care as referred    ----------------------------------------------  IDT 12/31  Therapy-- Ambulating 150ft Rollator, supervision, 8 stairs 1 hand rail supervision  SLP--Mod cog deficits, impulsivity,   Barriers--left groin wound, cognitive deficits  Est 1/4 vs RACHEL dependent on family impression post family training on 1/2  Daughter currently abroad and coming for family training 1/2  -----------------------------------------------  Dr. LOAIZAs Liaison with Family/Providers:    1/2--Meeting at bedside with patient, daughter and another daughter engaged via phone. We discussed her current functional progress as noted in last IDT  Persisting cog deficits with impulsivity.  Options for d/c disposition, GI f/u for possible PEG removal  Imaging to clarify on persisting Rt lung atelectasis  Family training today, Family and patient to decide between home dc with home care services, d/c to one of her children's home temporarily or short term RACHEL placement   1/6--I called on ph and d/w patient's daughter Ms Lima and patient's other daughter, and MD.   I discussed patient's functional and clinical update, including PEG removal, pulmonary recs for lung nodules, ambulatory functional distance, and deficits with higher executive function  She asked if patient has capacity for her own medical decision making and I confirmed that patient has capacity at this time  She followed through with instructions during therapy, her impulsivity and risk taking behavior significantly reduced. There was no display of any overt behavioral disturbance   Patient engaged well during discussion of falls prevention, and she worked with family to secure a plan for supervision by a live in private aid post discharge  She also agreed to home care referral, home therapy and f/u with specialists as discussed. Although she had some deficits with higher executive function, some of which would be age appropriate, she has reasonably preserved mental capacity, has good insight into her medical conditions and functional deficits  She was happy with the explanation and plan for dc home tomorrow   -----------------------------------------------    OUTPATIENT/FOLLOW UP:    Todd Devine MD  Vascular surgery, General surgery  [address] [facility]  [address]  [place] [address]  [phone]    Todd Odom MD  Cardiology, Interventional  [facility]   [address] [facility] [address] [facility] [address]  [address]  [facility], [address] [phone]

## 2025-01-07 VITALS
OXYGEN SATURATION: 94 % | SYSTOLIC BLOOD PRESSURE: 115 MMHG | RESPIRATION RATE: 16 BRPM | TEMPERATURE: 98 F | HEART RATE: 74 BPM | DIASTOLIC BLOOD PRESSURE: 77 MMHG

## 2025-01-07 LAB
GLUCOSE BLDC GLUCOMTR-MCNC: 106 MG/DL — HIGH (ref 70–99)
GLUCOSE BLDC GLUCOMTR-MCNC: 111 MG/DL — HIGH (ref 70–99)

## 2025-01-07 PROCEDURE — 99239 HOSP IP/OBS DSCHRG MGMT >30: CPT

## 2025-01-07 RX ADMIN — NYSTATIN TOPICAL POWDER 1 APPLICATION(S): 100000 POWDER TOPICAL at 05:51

## 2025-01-07 RX ADMIN — AMIODARONE HYDROCHLORIDE 200 MILLIGRAM(S): 200 TABLET ORAL at 05:46

## 2025-01-07 RX ADMIN — IPRATROPIUM BROMIDE AND ALBUTEROL SULFATE 3 MILLILITER(S): .5; 2.5 SOLUTION RESPIRATORY (INHALATION) at 04:38

## 2025-01-07 RX ADMIN — FLUTICASONE PROPIONATE AND SALMETEROL 1 DOSE(S): 50; 500 POWDER ORAL; RESPIRATORY (INHALATION) at 09:00

## 2025-01-07 RX ADMIN — Medication 81 MILLIGRAM(S): at 11:56

## 2025-01-07 RX ADMIN — IPRATROPIUM BROMIDE AND ALBUTEROL SULFATE 3 MILLILITER(S): .5; 2.5 SOLUTION RESPIRATORY (INHALATION) at 08:59

## 2025-01-07 RX ADMIN — Medication 12.5 MILLIGRAM(S): at 05:46

## 2025-01-07 RX ADMIN — BUMETANIDE 1 MILLIGRAM(S): 2 TABLET ORAL at 05:46

## 2025-01-07 RX ADMIN — CLOPIDOGREL BISULFATE 75 MILLIGRAM(S): 75 TABLET, FILM COATED ORAL at 11:57

## 2025-01-07 RX ADMIN — TIOTROPIUM BROMIDE MONOHYDRATE 2 PUFF(S): 18 CAPSULE ORAL; RESPIRATORY (INHALATION) at 09:00

## 2025-01-07 RX ADMIN — LINAGLIPTIN 5 MILLIGRAM(S): 5 TABLET, FILM COATED ORAL at 11:57

## 2025-01-07 RX ADMIN — PANTOPRAZOLE 40 MILLIGRAM(S): 40 TABLET, DELAYED RELEASE ORAL at 05:46

## 2025-01-07 RX ADMIN — METFORMIN 500 MILLIGRAM(S): 850 TABLET ORAL at 07:43

## 2025-01-07 RX ADMIN — Medication 600 MILLIGRAM(S): at 05:46

## 2025-01-07 NOTE — PROGRESS NOTE ADULT - SUBJECTIVE AND OBJECTIVE BOX
CC: S/p TAVR     Today's Subjective & Objective Findings:  Patient seen and examined at bedside this AM.  Admits to sleeping well.   Cough significantly reduced.  PEG site remains clean, dry and intact.  DC home with private hire today.     Denies headache, dizziness, visual changes, chest pain, SOB/DUFF, abdominal pain, nausea, vomiting, diarrhea, dysuria, numbness or tingling. LBM 1/6  Left groin wound on wound care, surface area is very minimal, no discharge    Therapy  Ambulated 130ft x 3 with Rollator seated rest breaks as needed; verbal cues for  diaphragmatic breathing to improve stamina.  Forward flexed posture with  ambulation, increased time.  Noted SOB toward end of walking.  Therapeutic Exercise  NuStep 4 mins with 2 min rest break  *Pt required constant motivation/encouragement to participate through strenuous  activity    Vital Signs Last 24 Hrs  T(C): 36.7 (07 Jan 2025 07:37), Max: 36.9 (06 Jan 2025 20:25)  T(F): 98 (07 Jan 2025 07:37), Max: 98.4 (06 Jan 2025 20:25)  HR: 74 (07 Jan 2025 07:37) (65 - 84)  BP: 115/77 (07 Jan 2025 07:37) (115/77 - 153/81)  BP(mean): --  RR: 16 (07 Jan 2025 07:37) (16 - 16)  SpO2: 94% (07 Jan 2025 07:37) (94% - 96%)    PHYSICAL EXAM  Constitutional -Comfortable  Neck - Supple  Cardiovascular - Palpable peripheral pulses   Respiratory/Chest- decreased air entry b/l Rt lower lobes and few rhonchi. normal air entry left lobes   Abdomen - Soft, Non-tender, opening at PEG removal site, no drainage, dry dressing in situ  Extremities - No cyanosis, No edema,     Neurologic Exam - Alert, Oriented  Sensory - Light touch sensation intact  Motor Function - Moves all extremities spontaneously  Skin -    L groin wound- dry dressing,    LABS:                        10.0   5.78  )-----------( 260      ( 06 Jan 2025 05:47 )             31.3     01-06    137  |  99  |  7   ----------------------------<  98  3.5   |  30  |  0.67    Ca    8.7      06 Jan 2025 05:47    TPro  7.2  /  Alb  2.5[L]  /  TBili  0.3  /  DBili  x   /  AST  15  /  ALT  <6[L]  /  AlkPhos  134[H]  01-06      Urinalysis Basic - ( 06 Jan 2025 05:47 )    Color: x / Appearance: x / SG: x / pH: x  Gluc: 98 mg/dL / Ketone: x  / Bili: x / Urobili: x   Blood: x / Protein: x / Nitrite: x   Leuk Esterase: x / RBC: x / WBC x   Sq Epi: x / Non Sq Epi: x / Bacteria: x          < from: CT Chest No Cont (01.02.25 @ 11:40) >  CT CHEST   ORDERED BY:  SOURAV SHEETS     PROCEDURE DATE:  01/02/2025          INTERPRETATION:  INDICATION: Persistent RLL atelectasis  TECHNIQUE: Unenhanced CT of the chest. Coronal, sagittal, and MIP images   were reconstructed and reviewed.  COMPARISON: No prior chest CT. CT abdomen and pelvis 6/11/2024    FINDINGS:    AIRWAYS, LUNGS, PLEURA: Patent central airways. Emphysema. There are   bilateral nodular opacities and bandlike consolidations within the lung   bases possibly infectious in etiology. No pleural effusion.    LYMPH NODES, MEDIASTINUM: No lymphadenopathy. Indeterminant 0.9 cm   anterior mediastinal nodule.    HEART, VESSELS: Heart size is normal. No pericardial effusion. Thoracic   aorta normal in diameter. Mitral annulus and coronary calcifications.   TAVR.    UPPER ABDOMEN: Indeterminant isodense material within gallbladder.   Gastrostomy.    CHEST WALL, BONES: No aggressive osseous lesion. Old fracture deformity   of multiple bilateral ribs and sternum.    LOWER NECK: Within normal limits.    IMPRESSION:    Bilateral nodular opacities and bandlike consolidations within the lung   bases possibly infectious in etiology. Recommend follow-up chest CT in   4-8 weeks to determine resolution.    Emphysema.    Indeterminant 0.9 cm anterior mediastinal nodule. Recommend evaluation   with CT chest without and with contrast anterior mediastinal lesion   protocol.    Indeterminant isodense material within gallbladder. Further evaluation   can be performed with sonogram.    < end of copied text >    < from: Xray Chest 1 View- PORTABLE-Urgent (Xray Chest 1 View- PORTABLE-Urgent .) (12.23.24 @ 11:01) >   XR CHEST PORTABLE URGENT 1V   ORDERED BY:  SOURAV SHEETS     PROCEDURE DATE:  12/23/2024      INTERPRETATION:  AP chest on December 23, 2024 at 10:51 AM. Patient has   wheezing.    Heart magnified by technique. TAVR aortic valve noted.    There is slight blunting right base laterally new since December 5, 2023.    IMPRESSION: Slight blunting of right base laterally new since prior.    CAPILLARY BLOOD GLUCOSE  POCT Blood Glucose.: 111 mg/dL (07 Jan 2025 07:24)  POCT Blood Glucose.: 91 mg/dL (06 Jan 2025 21:04)  POCT Blood Glucose.: 144 mg/dL (06 Jan 2025 16:58)  POCT Blood Glucose.: 74 mg/dL (06 Jan 2025 11:23)      MEDICATIONS  (STANDING):  albuterol/ipratropium for Nebulization 3 milliLiter(s) Nebulizer every 6 hours  aMIOdarone    Tablet 200 milliGRAM(s) Oral daily  AQUAPHOR (petrolatum Ointment) 1 Application(s) Topical two times a day  aspirin enteric coated 81 milliGRAM(s) Oral daily  buMETAnide 1 milliGRAM(s) Oral daily  clopidogrel Tablet 75 milliGRAM(s) Oral daily  dextrose 5%. 1000 milliLiter(s) (100 mL/Hr) IV Continuous <Continuous>  dextrose 5%. 1000 milliLiter(s) (50 mL/Hr) IV Continuous <Continuous>  dextrose 50% Injectable 25 Gram(s) IV Push once  dextrose 50% Injectable 12.5 Gram(s) IV Push once  dextrose 50% Injectable 25 Gram(s) IV Push once  escitalopram 20 milliGRAM(s) Oral at bedtime  fluticasone propionate/ salmeterol 250-50 MICROgram(s) Diskus 1 Dose(s) Inhalation two times a day  glucagon  Injectable 1 milliGRAM(s) IntraMuscular once  guaiFENesin  milliGRAM(s) Oral every 12 hours  hydrocodone/homatropine Syrup 5 milliLiter(s) Oral two times a day  insulin lispro (ADMELOG) corrective regimen sliding scale   SubCutaneous three times a day before meals  insulin lispro (ADMELOG) corrective regimen sliding scale   SubCutaneous at bedtime  linagliptin 5 milliGRAM(s) Oral daily  metFORMIN 500 milliGRAM(s) Oral two times a day with meals  metoprolol tartrate 12.5 milliGRAM(s) Oral two times a day  nystatin Powder 1 Application(s) Topical two times a day  pantoprazole    Tablet 40 milliGRAM(s) Oral before breakfast  tiotropium 2.5 MICROgram(s) Inhaler 2 Puff(s) Inhalation daily    MEDICATIONS  (PRN):  acetaminophen     Tablet .. 650 milliGRAM(s) Oral every 6 hours PRN Mild Pain (1 - 3)  benzonatate 100 milliGRAM(s) Oral three times a day PRN Cough  dextrose Oral Gel 15 Gram(s) Oral once PRN Blood Glucose LESS THAN 70 milliGRAM(s)/deciliter  guaiFENesin Oral Liquid (Sugar-Free) 100 milliGRAM(s) Oral every 6 hours PRN Cough  lidocaine 5% Ointment 1 Application(s) Topical three times a day PRN rib pain   CC: S/p TAVR     Today's Subjective & Objective Findings:  Patient seen and examined at bedside this AM.  Admits to sleeping well.   Cough resolved.  PEG removal site remains clean, dry dressing in situ  DC home with private hire today.     Denies headache, dizziness, visual changes, chest pain, SOB/DUFF, abdominal pain, nausea, vomiting, diarrhea, dysuria, numbness or tingling. LBM 1/6  Left groin wound on wound care, surface area is very minimal, no discharge    Therapy  Ambulated 130ft x 3 with Rollator seated rest breaks as needed; verbal cues for  diaphragmatic breathing to improve stamina.  Forward flexed posture with  ambulation, increased time.  Noted SOB toward end of walking.  Therapeutic Exercise  NuStep 4 mins with 2 min rest break  Pt required constant motivation/encouragement to participate through strenuous  activity    Vital Signs Last 24 Hrs  T(C): 36.7 (07 Jan 2025 07:37), Max: 36.9 (06 Jan 2025 20:25)  T(F): 98 (07 Jan 2025 07:37), Max: 98.4 (06 Jan 2025 20:25)  HR: 74 (07 Jan 2025 07:37) (65 - 84)  BP: 115/77 (07 Jan 2025 07:37) (115/77 - 153/81)  RR: 16 (07 Jan 2025 07:37) (16 - 16)  SpO2: 94% (07 Jan 2025 07:37) (94% - 96%)    PHYSICAL EXAM  Constitutional -Comfortable  Neck - Supple  Cardiovascular - Palpable peripheral pulses   Respiratory/Chest- decreased air entry b/l Rt lower lobes and few rhonchi. normal air entry left lobes   Abdomen - Soft, Non-tender, opening at PEG removal site, no drainage, dry dressing in situ  Extremities - No cyanosis, No edema,     Neurologic Exam - Alert, Oriented, appropriately interactive  Sensory - Light touch sensation intact  Motor Function - Moves all extremities spontaneously  Skin -    L groin wound- dry dressing, dry dressing over PEG removal site    LABS:                        10.0   5.78  )-----------( 260      ( 06 Jan 2025 05:47 )             31.3     01-06    137  |  99  |  7   ----------------------------<  98  3.5   |  30  |  0.67    Ca    8.7      06 Jan 2025 05:47    TPro  7.2  /  Alb  2.5[L]  /  TBili  0.3  /  DBili  x   /  AST  15  /  ALT  <6[L]  /  AlkPhos  134[H]  01-06      Urinalysis Basic - ( 06 Jan 2025 05:47 )    Color: x / Appearance: x / SG: x / pH: x  Gluc: 98 mg/dL / Ketone: x  / Bili: x / Urobili: x   Blood: x / Protein: x / Nitrite: x   Leuk Esterase: x / RBC: x / WBC x   Sq Epi: x / Non Sq Epi: x / Bacteria: x      < from: CT Chest No Cont (01.02.25 @ 11:40) >  CT CHEST   ORDERED BY:  SOURAV SHEETS     PROCEDURE DATE:  01/02/2025        INTERPRETATION:  INDICATION: Persistent RLL atelectasis  TECHNIQUE: Unenhanced CT of the chest. Coronal, sagittal, and MIP images   were reconstructed and reviewed.  COMPARISON: No prior chest CT. CT abdomen and pelvis 6/11/2024    FINDINGS:    AIRWAYS, LUNGS, PLEURA: Patent central airways. Emphysema. There are   bilateral nodular opacities and bandlike consolidations within the lung   bases possibly infectious in etiology. No pleural effusion.    LYMPH NODES, MEDIASTINUM: No lymphadenopathy. Indeterminant 0.9 cm   anterior mediastinal nodule.    HEART, VESSELS: Heart size is normal. No pericardial effusion. Thoracic   aorta normal in diameter. Mitral annulus and coronary calcifications.   TAVR.    UPPER ABDOMEN: Indeterminant isodense material within gallbladder.   Gastrostomy.    CHEST WALL, BONES: No aggressive osseous lesion. Old fracture deformity   of multiple bilateral ribs and sternum.    LOWER NECK: Within normal limits.    IMPRESSION:    Bilateral nodular opacities and bandlike consolidations within the lung   bases possibly infectious in etiology. Recommend follow-up chest CT in   4-8 weeks to determine resolution.    Emphysema.    Indeterminant 0.9 cm anterior mediastinal nodule. Recommend evaluation   with CT chest without and with contrast anterior mediastinal lesion   protocol.    Indeterminant isodense material within gallbladder. Further evaluation   can be performed with sonogram.    < end of copied text >    < from: Xray Chest 1 View- PORTABLE-Urgent (Xray Chest 1 View- PORTABLE-Urgent .) (12.23.24 @ 11:01) >   XR CHEST PORTABLE URGENT 1V   ORDERED BY:  SOURAV SHEETS     PROCEDURE DATE:  12/23/2024      INTERPRETATION:  AP chest on December 23, 2024 at 10:51 AM. Patient has   wheezing.    Heart magnified by technique. TAVR aortic valve noted.    There is slight blunting right base laterally new since December 5, 2023.    IMPRESSION: Slight blunting of right base laterally new since prior.    CAPILLARY BLOOD GLUCOSE  POCT Blood Glucose.: 111 mg/dL (07 Jan 2025 07:24)  POCT Blood Glucose.: 91 mg/dL (06 Jan 2025 21:04)  POCT Blood Glucose.: 144 mg/dL (06 Jan 2025 16:58)  POCT Blood Glucose.: 74 mg/dL (06 Jan 2025 11:23)      MEDICATIONS  (STANDING):  albuterol/ipratropium for Nebulization 3 milliLiter(s) Nebulizer every 6 hours  aMIOdarone    Tablet 200 milliGRAM(s) Oral daily  AQUAPHOR (petrolatum Ointment) 1 Application(s) Topical two times a day  aspirin enteric coated 81 milliGRAM(s) Oral daily  buMETAnide 1 milliGRAM(s) Oral daily  clopidogrel Tablet 75 milliGRAM(s) Oral daily  dextrose 5%. 1000 milliLiter(s) (100 mL/Hr) IV Continuous <Continuous>  dextrose 5%. 1000 milliLiter(s) (50 mL/Hr) IV Continuous <Continuous>  dextrose 50% Injectable 25 Gram(s) IV Push once  dextrose 50% Injectable 12.5 Gram(s) IV Push once  dextrose 50% Injectable 25 Gram(s) IV Push once  escitalopram 20 milliGRAM(s) Oral at bedtime  fluticasone propionate/ salmeterol 250-50 MICROgram(s) Diskus 1 Dose(s) Inhalation two times a day  glucagon  Injectable 1 milliGRAM(s) IntraMuscular once  guaiFENesin  milliGRAM(s) Oral every 12 hours  hydrocodone/homatropine Syrup 5 milliLiter(s) Oral two times a day  insulin lispro (ADMELOG) corrective regimen sliding scale   SubCutaneous three times a day before meals  insulin lispro (ADMELOG) corrective regimen sliding scale   SubCutaneous at bedtime  linagliptin 5 milliGRAM(s) Oral daily  metFORMIN 500 milliGRAM(s) Oral two times a day with meals  metoprolol tartrate 12.5 milliGRAM(s) Oral two times a day  nystatin Powder 1 Application(s) Topical two times a day  pantoprazole    Tablet 40 milliGRAM(s) Oral before breakfast  tiotropium 2.5 MICROgram(s) Inhaler 2 Puff(s) Inhalation daily    MEDICATIONS  (PRN):  acetaminophen     Tablet .. 650 milliGRAM(s) Oral every 6 hours PRN Mild Pain (1 - 3)  benzonatate 100 milliGRAM(s) Oral three times a day PRN Cough  dextrose Oral Gel 15 Gram(s) Oral once PRN Blood Glucose LESS THAN 70 milliGRAM(s)/deciliter  guaiFENesin Oral Liquid (Sugar-Free) 100 milliGRAM(s) Oral every 6 hours PRN Cough  lidocaine 5% Ointment 1 Application(s) Topical three times a day PRN rib pain

## 2025-01-07 NOTE — PROGRESS NOTE ADULT - PROVIDER SPECIALTY LIST ADULT
Hospitalist
Physiatry
Rehab Medicine
Hospitalist
Physiatry
Physiatry
Pulmonology
Rehab Medicine
Hospitalist
Pulmonology
Rehab Medicine
Gastroenterology
Hospitalist
Rehab Medicine
Gastroenterology
Gastroenterology
Hospitalist
Rehab Medicine
Gastroenterology
Hospitalist

## 2025-01-07 NOTE — PROGRESS NOTE ADULT - NS ATTEND AMEND GEN_ALL_CORE FT
Seen and examined with NP    Patient has not acute med complaint  Slept well  Breathing normally on R/A  Tolerating oral diet     Left lower groin wound and wound at PEG removal site, no discharge    DC home today   Home care as discussed  Falls prevention   F/u with PCP for intermittent hand tremors   Pulmonary f/u for lung nodules

## 2025-01-07 NOTE — PROGRESS NOTE ADULT - ASSESSMENT
79 year old, obese, female with PMH of DAYANA (on CPAP), aortic stenosis, HTN, HLD, current smoker, COPD, Type 2 Diabetes Mellitus, and non-alcoholic fatty liver disease; who presented to [facility] on 11/20 for a scheduled TAVR, and possible stent placement.  She reports several month history of DUFF while walking and climbing up stairs, fatigue and BL LE edema. On 11/20 she underwent TAVR, drainage of retroperitoneal hematoma and pericardial window with Dr. Garland and Noah. Intraoperatively, she had an episode of Vtach (on Amiodarone).    Her hospital course was complicated by the following: acute hypoxic respiratory failure (requiring BiPAP and eventual intubation on 11/25), hemorrhagic shock, AFIB w/ RVR, coffee ground emesis, Code Blue (s/p CPR resulting in rib fractures), AIDA, fever secondary to aspiration PNA, +pseudomonas in sputum, persistent leukocytosis and low grade fevers (on Vanco per ID), L lateral abdominal wall hematoma, distended gallbladder and gallbladder sludge, dysphagia (s/p trache and PEG placement on 12/3), decannulation (12/13), L groin delayed wound healing (requiring wound vac), and hyperglycemia.  Patient now admitted for a multidisciplinary rehab program. 12-19-24 @ 13:25      * S/p PEG removal on 1/4, tolerated  * Pulmonary and GI review recs appreciated  * DC home today with private hire aide     #Aortic valve disease S/p TAVR  - Gait Instability, ADL impairments and Functional impairments:   Continue Comprehensive Rehab Program of PT/OT/SLP  - 3 hours a day, 5 days a week  - ASA 81mg daily  - Plavix 75mg daily     REHAB ISSUES  Decreased ambulatory distance  Left groin wound  Impaired balance  Resp failure     #Acute Hypoxic Respiratory Failures   #Cough  (On nightly CPAP prior to acute care admission, but no longer requiring this all through admission)   - CXR with R lung base atelectasis  - Mucinex BID  - Chest PT   - Incentive Spirometer   - CT chest (1/2) emphysema, BL nodules, consolidations, and 0.9cm anterior mediastinal mass - pulm recs appreciated - repeat CT Chest in 3-4 months   - Advair  - Spiriva  - Duoneb every 6 hours  - Hycodan 5mL BID       #HTN  #AFIB w/ RVR  - Amiodarone 200mg daily   - Bumex 1mg daily   - Metoprolol 12.5mg BID     #Type 2 Diabetes Mellitus  - A1c: 6.4% (Dec 2024)    - FS AC & HS  - Mod ISS  - Lantus 12U HS    #Mood  - Escitalopram 20mg daily     #Skin - Trach site healed  - Aquaphor BID   - Pressure injury/Skin: OOB to Chair, PT/OT   - Left groin wound - dry dressing      #Pain Mgmt   - PRN: Tylenol     #GI/Bowel Mgmt   - Pantoprazole  - Senna , Miralax    #/Bladder Mgmt --voiding     #FEN   - Diet - Regular with Thin Liquids   - Dysphagia  SLP - evaluation and treatment  - S/p MBS on 12/23 - passed, diet upgraded (as above)  - Dysphagia-resolved  - S/p PEG removal on 1/4 by GI team     #Precautions / PROPHYLAXIS:   - Falls, Cardiac  - ortho: Weight bearing status: WBAT   - Lungs: Aspiration, Incentive Spirometer   - DVT: ASA 81mg daily & Plavix 75mg daily, TEDs   ----------------------------------------------  Next of Kin:   Daughter: Janie Drake: [phone]    HEALTH MANAGEMENT   1/3--Patient reports that family has made arrangements for a live in care giver post discharge  They are also interested in the home care as referred    ----------------------------------------------  IDT 12/31  Therapy-- Ambulating 150ft Rollator, supervision, 8 stairs 1 hand rail supervision  SLP--Mod cog deficits, impulsivity,   Barriers--left groin wound, cognitive deficits  Est 1/4 vs RACHEL dependent on family impression post family training on 1/2  Daughter currently abroad and coming for family training 1/2  -----------------------------------------------  Dr. SMITH's Liaison with Family/Providers:    1/2--Meeting at bedside with patient, daughter and another daughter engaged via phone. We discussed her current functional progress as noted in last IDT  Persisting cog deficits with impulsivity.  Options for d/c disposition, GI f/u for possible PEG removal  Imaging to clarify on persisting Rt lung atelectasis  Family training today, Family and patient to decide between home dc with home care services, d/c to one of her children's home temporarily or short term RACHEL placement   1/6--I called on ph and d/w patient's daughter Ms Lima and patient's other daughter, and MD.   I discussed patient's functional and clinical update, including PEG removal, pulmonary recs for lung nodules, ambulatory functional distance, and deficits with higher executive function  She asked if patient has capacity for her own medical decision making and I confirmed that patient has capacity at this time  She followed through with instructions during therapy, her impulsivity and risk taking behavior significantly reduced. There was no display of any overt behavioral disturbance   Patient engaged well during discussion of falls prevention, and she worked with family to secure a plan for supervision by a live in private aid post discharge  She also agreed to home care referral, home therapy and f/u with specialists as discussed. Although she had some deficits with higher executive function, some of which would be age appropriate, she has reasonably preserved mental capacity, has good insight into her medical conditions and functional deficits  She was happy with the explanation and plan for dc home tomorrow   -----------------------------------------------    OUTPATIENT/FOLLOW UP:    Todd Devine MD  Vascular surgery, General surgery  [address]  [address]  [place] [address]  [phone]    Todd Odom MD  Cardiology, Interventional  [facility]   [address]  [address]  [facility], [address] [phone]   79 year old, obese, female with PMH of DAYANA (on CPAP), aortic stenosis, HTN, HLD, current smoker, COPD, Type 2 Diabetes Mellitus, and non-alcoholic fatty liver disease; who presented to [facility] on 11/20 for a scheduled TAVR, and possible stent placement.  She reports several month history of DUFF while walking and climbing up stairs, fatigue and BL LE edema. On 11/20 she underwent TAVR, drainage of retroperitoneal hematoma and pericardial window with Dr. Garland and Noah. Intraoperatively, she had an episode of Vtach (on Amiodarone).    Her hospital course was complicated by the following: acute hypoxic respiratory failure (requiring BiPAP and eventual intubation on 11/25), hemorrhagic shock, AFIB w/ RVR, coffee ground emesis, Code Blue (s/p CPR resulting in rib fractures), AIDA, fever secondary to aspiration PNA, +pseudomonas in sputum, persistent leukocytosis and low grade fevers (on Vanco per ID), L lateral abdominal wall hematoma, distended gallbladder and gallbladder sludge, dysphagia (s/p trache and PEG placement on 12/3), decannulation (12/13), L groin delayed wound healing (requiring wound vac), and hyperglycemia.  Patient now admitted for a multidisciplinary rehab program. 12-19-24 @ 13:25    * Pulmonary and GI review recs appreciated  * DC home today with private hire aide and home care      #Aortic valve disease S/p TAVR  - Gait Instability, ADL impairments and Functional impairments:   Continue Comprehensive Rehab Program of PT/OT/SLP  - 3 hours a day, 5 days a week  - ASA 81mg daily  - Plavix 75mg daily     REHAB ISSUES  Decreased ambulatory distance  Left groin wound  Impaired balance  Resp failure     #Acute Hypoxic Respiratory Failures   #Cough  (On nightly CPAP prior to acute care admission, but no longer requiring this all through admission)   - CXR with R lung base atelectasis  - Mucinex BID  - Chest PT   - Incentive Spirometer   - CT chest (1/2) emphysema, BL nodules, consolidations, and 0.9cm anterior mediastinal mass - pulm recs appreciated - repeat CT Chest in 3-4 months   - Advair  - Spiriva  - Duoneb every 6 hours  - Hycodan 5mL BID       #HTN  #AFIB w/ RVR  - Amiodarone 200mg daily   - Bumex 1mg daily   - Metoprolol 12.5mg BID     #Type 2 Diabetes Mellitus  - A1c: 6.4% (Dec 2024)    - FS AC & HS  - Mod ISS  - Lantus 12U HS    #Mood  - Escitalopram 20mg daily     #Skin - Trach site healed  - Aquaphor BID   - Pressure injury/Skin: OOB to Chair, PT/OT   - Left groin wound - dry dressing      #Pain Mgmt   - PRN: Tylenol     #GI/Bowel Mgmt   - Pantoprazole  - Senna , Miralax    #/Bladder Mgmt --voiding     #FEN   - Diet - Regular with Thin Liquids   - Dysphagia  resolved, tolerating oral diet  - S/p PEG removal on 1/4 by GI team     #Precautions / PROPHYLAXIS:   - Falls, Cardiac  - ortho: Weight bearing status: WBAT   - Lungs: Aspiration, Incentive Spirometer   - DVT: ASA 81mg daily & Plavix 75mg daily, TEDs   ----------------------------------------------  Next of Kin:   Daughter: Janie Drake: [phone]    HEALTH MANAGEMENT   1/3--Patient reports that family has made arrangements for a live in care giver post discharge  They are also interested in the home care as referred  1/6 Daughter confirmed plan for privately hired home care for patient  Patient showed reasonable insight and knowledge of her deficits  Understood risk issues, falls prevention and post d/c care and discussed     IDT 12/31  Therapy-- Ambulating 150ft Rollator, supervision, 8 stairs 1 hand rail supervision  SLP--Mod cog deficits, impulsivity,   Barriers--left groin wound, cognitive deficits  Est 1/4 vs RACHEL dependent on family impression post family training on 1/2  Daughter currently abroad and coming for family training 1/2  -----------------------------------------------  Dr. SMITH's Liaison with Family/Providers:    1/2--Meeting at bedside with patient, daughter and another daughter engaged via phone. We discussed her current functional progress as noted in last IDT  Persisting cog deficits with impulsivity.  Options for d/c disposition, GI f/u for possible PEG removal  Imaging to clarify on persisting Rt lung atelectasis  Family training today, Family and patient to decide between home dc with home care services, d/c to one of her children's home temporarily or short term RACHEL placement   1/6--I called on ph and d/w patient's daughter Ms Lima and patient's other daughter, and MD.--Dr Sterling   I discussed patient's functional and clinical update, including PEG removal, pulmonary recs for lung nodules, ambulatory functional distance, and deficits with higher executive function  She asked if patient has capacity for her own medical decision making and I confirmed that patient has capacity at this time  She followed through with instructions during therapy, her impulsivity and risk taking behavior significantly reduced. There was no display of any overt behavioral disturbance   Patient engaged well during discussion of falls prevention, and she worked with family to secure a plan for supervision by a live in private aid post discharge  She also agreed to home care referral, home therapy and f/u with specialists as discussed. Although she had some deficits with higher executive function, some of which would be age appropriate, she has reasonably preserved mental capacity, has good insight into her medical conditions and functional deficits  She was happy with the explanation and plan for dc home tomorrow       OUTPATIENT/FOLLOW UP:    Todd Devine MD  Vascular surgery, General surgery  [address]  [address]  [place] [address]  [phone]    Todd Odom MD  Cardiology, Interventional  [facility]   [address]  [address]  [facility] [place], [address] [phone]   79 year old, obese, female with PMH of DAYANA (on CPAP), aortic stenosis, HTN, HLD, current smoker, COPD, Type 2 Diabetes Mellitus, and non-alcoholic fatty liver disease; who presented to [facility] on 11/20 for a scheduled TAVR, and possible stent placement.  She reports several month history of DUFF while walking and climbing up stairs, fatigue and BL LE edema. On 11/20 she underwent TAVR, drainage of retroperitoneal hematoma and pericardial window with Dr. Garland and Noah. Intraoperatively, she had an episode of Vtach (on Amiodarone).    Her hospital course was complicated by the following: acute hypoxic respiratory failure (requiring BiPAP and eventual intubation on 11/25), hemorrhagic shock, AFIB w/ RVR, coffee ground emesis, Code Blue (s/p CPR resulting in rib fractures), AIDA, fever secondary to aspiration PNA, +pseudomonas in sputum, persistent leukocytosis and low grade fevers (on Vanco per ID), L lateral abdominal wall hematoma, distended gallbladder and gallbladder sludge, dysphagia (s/p trache and PEG placement on 12/3), decannulation (12/13), L groin delayed wound healing (requiring wound vac), and hyperglycemia.  Patient now admitted for a multidisciplinary rehab program. 12-19-24 @ 13:25    * Pulmonary and GI review recs appreciated  * DC home today with private hire aide and home care      #Aortic valve disease S/p TAVR  - Gait Instability, ADL impairments and Functional impairments:   Continue Comprehensive Rehab Program of PT/OT/SLP  - 3 hours a day, 5 days a week  - ASA 81mg daily  - Plavix 75mg daily     REHAB ISSUES  Decreased ambulatory distance  Left groin wound  Impaired balance  Resp failure     #Acute Hypoxic Respiratory Failures   #Cough  (On nightly CPAP prior to acute care admission, but no longer requiring this all through admission)   - CXR with R lung base atelectasis  - Mucinex BID  - Chest PT   - Incentive Spirometer   - CT chest (1/2) emphysema, BL nodules, consolidations, and 0.9cm anterior mediastinal mass - pulm recs appreciated - repeat CT Chest in 3-4 months   - Advair  - Spiriva  - Duoneb every 6 hours  - Hycodan 5mL BID       #HTN  #AFIB w/ RVR  - Amiodarone 200mg daily   - Bumex 1mg daily   - Metoprolol 12.5mg BID     #Type 2 Diabetes Mellitus  - A1c: 6.4% (Dec 2024)    - FS AC & HS  - Mod ISS  - Lantus 12U HS    #Mood  - Escitalopram 20mg daily     #Skin - Trach site healed  - Aquaphor BID   - Pressure injury/Skin: OOB to Chair, PT/OT   - Left groin wound - dry dressing      #Pain Mgmt   - PRN: Tylenol     #GI/Bowel Mgmt   - Pantoprazole  - Senna , Miralax    #/Bladder Mgmt --voiding     #FEN   - Diet - Regular with Thin Liquids   - Dysphagia  resolved, tolerating oral diet  - S/p PEG removal on 1/4 by GI team     #Precautions / PROPHYLAXIS:   - Falls, Cardiac  - ortho: Weight bearing status: WBAT   - Lungs: Aspiration, Incentive Spirometer   - DVT: ASA 81mg daily & Plavix 75mg daily, TEDs   ----------------------------------------------  Next of Kin:   Daughter: Janie Drake: [phone]    HEALTH MANAGEMENT   1/3--Patient reports that family has made arrangements for a live in care giver post discharge  They are also interested in the home care as referred  1/6 Daughter confirmed plan for privately hired home care for patient  Patient showed reasonable insight and knowledge of her deficits  Understood risk issues, falls prevention and post d/c care and discussed     IDT 12/31  Therapy-- Ambulating 150ft Rollator, supervision, 8 stairs 1 hand rail supervision  SLP--Mod cog deficits, impulsivity,   Barriers--left groin wound, cognitive deficits  Est 1/4 vs RACHEL dependent on family impression post family training on 1/2  Daughter currently abroad and coming for family training 1/2--revised to 1/7  -----------------------------------------------  Dr. SMITH's Liaison with Family/Providers:    1/2--Meeting at bedside with patient, daughter and another daughter engaged via phone. We discussed her current functional progress as noted in last IDT  Persisting cog deficits with impulsivity.  Options for d/c disposition, GI f/u for possible PEG removal  Imaging to clarify on persisting Rt lung atelectasis  Family training today, Family and patient to decide between home dc with home care services, d/c to one of her children's home temporarily or short term RACHEL placement   1/6--I called on ph and d/w patient's daughter Ms Lima and patient's other daughter, and MD.--Dr Sterling   I discussed patient's functional and clinical update, including PEG removal, pulmonary recs for lung nodules, ambulatory functional distance, and deficits with higher executive function  She asked if patient has capacity for her own medical decision making and I confirmed that patient has capacity at this time  She followed through with instructions during therapy, her impulsivity and risk taking behavior significantly reduced. There was no display of any overt behavioral disturbance   Patient engaged well during discussion of falls prevention, and she worked with family to secure a plan for supervision by a live in private aid post discharge  She also agreed to home care referral, home therapy and f/u with specialists as discussed. Although she had some deficits with higher executive function, some of which would be age appropriate, she has reasonably preserved mental capacity, has good insight into her medical conditions and functional deficits  She was happy with the explanation and plan for dc home tomorrow       OUTPATIENT/FOLLOW UP:    Todd Devine MD  Vascular surgery, General surgery  [address]  [address]  [place] [address] [phone]    Todd Odom MD  Cardiology, Interventional  [facility]   [address]  [address]  [facility], [address] [phone]

## 2025-01-07 NOTE — PROGRESS NOTE ADULT - NS ATTEND OPT1 GEN_ALL_CORE
I independently performed the documented:
I attest my time as attending is greater than 50% of the total combined time spent on qualifying patient care activities by the PA/NP and attending.
I independently performed the documented:
I independently performed the documented:
I attest my time as attending is greater than 50% of the total combined time spent on qualifying patient care activities by the PA/NP and attending.

## 2025-01-07 NOTE — PROGRESS NOTE ADULT - REASON FOR ADMISSION
Aortic Valve Disease s/p TAVR

## 2025-01-15 PROBLEM — E11.9 TYPE 2 DIABETES MELLITUS WITHOUT COMPLICATIONS: Chronic | Status: ACTIVE | Noted: 2024-12-19

## 2025-01-15 PROBLEM — I10 ESSENTIAL (PRIMARY) HYPERTENSION: Chronic | Status: ACTIVE | Noted: 2024-12-19

## 2025-01-15 PROBLEM — I35.0 NONRHEUMATIC AORTIC (VALVE) STENOSIS: Chronic | Status: ACTIVE | Noted: 2024-12-19

## 2025-01-15 PROBLEM — K76.0 FATTY (CHANGE OF) LIVER, NOT ELSEWHERE CLASSIFIED: Chronic | Status: ACTIVE | Noted: 2024-12-19

## 2025-01-15 PROBLEM — G47.33 OBSTRUCTIVE SLEEP APNEA (ADULT) (PEDIATRIC): Chronic | Status: ACTIVE | Noted: 2024-12-19

## 2025-01-15 PROBLEM — J44.9 CHRONIC OBSTRUCTIVE PULMONARY DISEASE, UNSPECIFIED: Chronic | Status: ACTIVE | Noted: 2024-12-19

## 2025-01-15 PROBLEM — E78.5 HYPERLIPIDEMIA, UNSPECIFIED: Chronic | Status: ACTIVE | Noted: 2024-12-19

## 2025-02-07 ENCOUNTER — APPOINTMENT (OUTPATIENT)
Dept: PHYSICAL MEDICINE AND REHAB | Facility: CLINIC | Age: 80
End: 2025-02-07
Payer: MEDICARE

## 2025-02-07 ENCOUNTER — NON-APPOINTMENT (OUTPATIENT)
Age: 80
End: 2025-02-07

## 2025-02-07 VITALS
DIASTOLIC BLOOD PRESSURE: 76 MMHG | HEART RATE: 78 BPM | SYSTOLIC BLOOD PRESSURE: 132 MMHG | WEIGHT: 190 LBS | TEMPERATURE: 97.8 F | OXYGEN SATURATION: 94 %

## 2025-02-07 DIAGNOSIS — Z95.2 PRESENCE OF PROSTHETIC HEART VALVE: ICD-10-CM

## 2025-02-07 DIAGNOSIS — R35.0 FREQUENCY OF MICTURITION: ICD-10-CM

## 2025-02-07 DIAGNOSIS — R26.9 UNSPECIFIED ABNORMALITIES OF GAIT AND MOBILITY: ICD-10-CM

## 2025-02-07 DIAGNOSIS — R46.89 OTHER SYMPTOMS AND SIGNS INVOLVING APPEARANCE AND BEHAVIOR: ICD-10-CM

## 2025-02-07 DIAGNOSIS — R41.3 OTHER AMNESIA: ICD-10-CM

## 2025-02-07 PROCEDURE — 99215 OFFICE O/P EST HI 40 MIN: CPT

## 2025-02-10 PROCEDURE — 94640 AIRWAY INHALATION TREATMENT: CPT

## 2025-02-10 PROCEDURE — 71045 X-RAY EXAM CHEST 1 VIEW: CPT

## 2025-02-10 PROCEDURE — 94668 MNPJ CHEST WALL SBSQ: CPT

## 2025-02-10 PROCEDURE — 92507 TX SP LANG VOICE COMM INDIV: CPT | Mod: GN

## 2025-02-10 PROCEDURE — 92611 MOTION FLUOROSCOPY/SWALLOW: CPT | Mod: GN

## 2025-02-10 PROCEDURE — 92523 SPEECH SOUND LANG COMPREHEN: CPT | Mod: GN

## 2025-02-10 PROCEDURE — 87635 SARS-COV-2 COVID-19 AMP PRB: CPT

## 2025-02-10 PROCEDURE — 87637 SARSCOV2&INF A&B&RSV AMP PRB: CPT

## 2025-02-10 PROCEDURE — 97161 PT EVAL LOW COMPLEX 20 MIN: CPT | Mod: GP

## 2025-02-10 PROCEDURE — 80053 COMPREHEN METABOLIC PANEL: CPT

## 2025-02-10 PROCEDURE — 83036 HEMOGLOBIN GLYCOSYLATED A1C: CPT

## 2025-02-10 PROCEDURE — 97530 THERAPEUTIC ACTIVITIES: CPT | Mod: GP

## 2025-02-10 PROCEDURE — 71250 CT THORAX DX C-: CPT | Mod: MC

## 2025-02-10 PROCEDURE — 97535 SELF CARE MNGMENT TRAINING: CPT | Mod: GO

## 2025-02-10 PROCEDURE — 92610 EVALUATE SWALLOWING FUNCTION: CPT | Mod: GN

## 2025-02-10 PROCEDURE — 36415 COLL VENOUS BLD VENIPUNCTURE: CPT

## 2025-02-10 PROCEDURE — 74230 X-RAY XM SWLNG FUNCJ C+: CPT

## 2025-02-10 PROCEDURE — 82962 GLUCOSE BLOOD TEST: CPT

## 2025-02-10 PROCEDURE — 81003 URINALYSIS AUTO W/O SCOPE: CPT

## 2025-02-10 PROCEDURE — 97110 THERAPEUTIC EXERCISES: CPT | Mod: GO

## 2025-02-10 PROCEDURE — 93005 ELECTROCARDIOGRAM TRACING: CPT

## 2025-02-10 PROCEDURE — 92526 ORAL FUNCTION THERAPY: CPT | Mod: GN

## 2025-02-10 PROCEDURE — 85025 COMPLETE CBC W/AUTO DIFF WBC: CPT

## 2025-02-10 PROCEDURE — 97116 GAIT TRAINING THERAPY: CPT | Mod: GP

## 2025-02-10 PROCEDURE — 97165 OT EVAL LOW COMPLEX 30 MIN: CPT | Mod: GO

## 2025-02-10 PROCEDURE — 94667 MNPJ CHEST WALL 1ST: CPT

## 2025-02-12 ENCOUNTER — APPOINTMENT (OUTPATIENT)
Dept: OPHTHALMOLOGY | Facility: CLINIC | Age: 80
End: 2025-02-12
Payer: MEDICARE

## 2025-02-12 ENCOUNTER — NON-APPOINTMENT (OUTPATIENT)
Age: 80
End: 2025-02-12

## 2025-02-12 PROCEDURE — 92134 CPTRZ OPH DX IMG PST SGM RTA: CPT

## 2025-02-12 PROCEDURE — 92004 COMPRE OPH EXAM NEW PT 1/>: CPT

## 2025-02-13 ENCOUNTER — NON-APPOINTMENT (OUTPATIENT)
Age: 80
End: 2025-02-13

## 2025-02-13 ENCOUNTER — APPOINTMENT (OUTPATIENT)
Dept: OPHTHALMOLOGY | Facility: CLINIC | Age: 80
End: 2025-02-13
Payer: MEDICARE

## 2025-02-13 PROCEDURE — 92083 EXTENDED VISUAL FIELD XM: CPT

## 2025-03-27 ENCOUNTER — APPOINTMENT (OUTPATIENT)
Dept: RADIOLOGY | Facility: CLINIC | Age: 80
End: 2025-03-27
Payer: MEDICARE

## 2025-03-27 PROCEDURE — 71046 X-RAY EXAM CHEST 2 VIEWS: CPT

## 2025-03-28 ENCOUNTER — APPOINTMENT (OUTPATIENT)
Dept: OPHTHALMOLOGY | Facility: CLINIC | Age: 80
End: 2025-03-28
Payer: MEDICARE

## 2025-03-28 PROCEDURE — 92083 EXTENDED VISUAL FIELD XM: CPT

## 2025-04-15 ENCOUNTER — APPOINTMENT (OUTPATIENT)
Dept: OPHTHALMOLOGY | Facility: CLINIC | Age: 80
End: 2025-04-15
Payer: MEDICARE

## 2025-04-15 PROCEDURE — 92083 EXTENDED VISUAL FIELD XM: CPT
